# Patient Record
Sex: MALE | Race: WHITE | NOT HISPANIC OR LATINO | Employment: OTHER | URBAN - METROPOLITAN AREA
[De-identification: names, ages, dates, MRNs, and addresses within clinical notes are randomized per-mention and may not be internally consistent; named-entity substitution may affect disease eponyms.]

---

## 2018-10-05 ENCOUNTER — OFFICE VISIT (OUTPATIENT)
Dept: FAMILY MEDICINE | Facility: CLINIC | Age: 68
End: 2018-10-05
Payer: MEDICARE

## 2018-10-05 VITALS
DIASTOLIC BLOOD PRESSURE: 85 MMHG | HEART RATE: 85 BPM | TEMPERATURE: 98.6 F | OXYGEN SATURATION: 99 % | SYSTOLIC BLOOD PRESSURE: 174 MMHG

## 2018-10-05 DIAGNOSIS — I10 ESSENTIAL HYPERTENSION WITH GOAL BLOOD PRESSURE LESS THAN 140/90: Primary | ICD-10-CM

## 2018-10-05 DIAGNOSIS — Z23 NEED FOR INFLUENZA VACCINATION: ICD-10-CM

## 2018-10-05 DIAGNOSIS — F41.9 ANXIETY: ICD-10-CM

## 2018-10-05 PROCEDURE — 99203 OFFICE O/P NEW LOW 30 MIN: CPT | Mod: 25 | Performed by: FAMILY MEDICINE

## 2018-10-05 PROCEDURE — 90662 IIV NO PRSV INCREASED AG IM: CPT | Performed by: FAMILY MEDICINE

## 2018-10-05 PROCEDURE — G0008 ADMIN INFLUENZA VIRUS VAC: HCPCS | Performed by: FAMILY MEDICINE

## 2018-10-05 RX ORDER — INDOMETHACIN 50 MG/1
50 CAPSULE ORAL
COMMUNITY
Start: 2018-06-17 | End: 2019-12-31

## 2018-10-05 RX ORDER — TRAZODONE HYDROCHLORIDE 50 MG/1
50 TABLET, FILM COATED ORAL
COMMUNITY
Start: 2018-06-17 | End: 2022-10-04

## 2018-10-05 RX ORDER — LOSARTAN POTASSIUM 100 MG/1
100 TABLET ORAL
COMMUNITY
End: 2018-12-05

## 2018-10-05 RX ORDER — CARVEDILOL 12.5 MG/1
12.5 TABLET ORAL 2 TIMES DAILY WITH MEALS
Qty: 180 TABLET | Refills: 1 | Status: SHIPPED | OUTPATIENT
Start: 2018-10-05 | End: 2018-12-05

## 2018-10-05 RX ORDER — HYDRALAZINE HYDROCHLORIDE 25 MG/1
50 TABLET, FILM COATED ORAL 2 TIMES DAILY
COMMUNITY
End: 2018-12-05

## 2018-10-05 RX ORDER — PROPRANOLOL HYDROCHLORIDE 20 MG/1
20 TABLET ORAL 2 TIMES DAILY
COMMUNITY
Start: 2018-10-02 | End: 2018-10-05

## 2018-10-05 NOTE — MR AVS SNAPSHOT
After Visit Summary   10/5/2018    Bill Smith    MRN: [id number]           Patient Information     Date Of Birth          1950        Visit Information        Provider Department      10/5/2018 3:00 PM Nirmal Lubin MD [facility]        Today's Diagnoses     Essential hypertension with goal blood pressure less than 140/90    -  1    Need for influenza vaccination          Care Instructions    At [facility], we strive to deliver an exceptional experience to you, every time we see you.  If you receive a survey in the mail, please send us back your thoughts. We really do value your feedback.    Based on your medical history, these are the current health maintenance/preventive care services that you are due for (some may have been done at this visit.)  Health Maintenance Due   Topic Date Due     PHQ-2 Q1 YR  10/22/1960     HEPATITIS C SCREENING  10/22/1966     FALL RISK ASSESSMENT  10/22/2013     PNEUMOCOCCAL (1 of 2 - PCV13) 10/22/2013     BMP Q1 YR  11/28/2013     MICROALBUMIN Q1 YEAR  11/30/2013     ADVANCE DIRECTIVE PLANNING Q5 YRS  03/29/2017     LIPID SCREEN Q5 YR MALE (SYSTEM ASSIGNED)  11/28/2017     INFLUENZA VACCINE (1) 09/01/2018         Suggested websites for health information:  Www.[facility] SpringsFood [employer] S[employer] : Up to date and easily searchable information on multiple topics.  Www.medlineplus.gov : medication info, interactive tutorials, watch real surgeries online  Www.familydoctor.org : good info from the Academy of Family Physicians  Www.cdc.gov : public health info, travel advisories, epidemics (H1N1)  Www.aap.org : children's health info, normal development, vaccinations  Www.health.state[place] : [place] dept of health, public health issues in [place], N1N1    Your care team:                            Family Medicine Internal Medicine   MD Jonathan Albarran MD Shantel Branch-Fleming, MD Katya Georgiev PA-C Nam Ho, MD Pediatrics   Jake Storm,  "JESSE Ho CNP, MD Bethany Templen, MD Deborah Mielke, MD Kim Thein, APRN CNP      Clinic hours: Monday - Thursday 7 am-7 pm;  7 am-5 pm.   Urgent care: Monday - Friday 11 am-9 pm; Saturday and  9 am-5 pm.  Pharmacy : Monday -Thursday 8 am-8 pm; Friday 8 am-6 pm; Saturday and  9 am-5 pm.     Clinic: [phone]   Pharmacy: [phone]                Follow-ups after your visit        Who to contact     If you have questions or need follow up information about today's clinic visit or your schedule please contact [facility] [place] directly at [phone].  Normal or non-critical lab and imaging results will be communicated to you by MyChart, letter or phone within 4 business days after the clinic has received the results. If you do not hear from us within 7 days, please contact the clinic through "[employer]"hart or phone. If you have a critical or abnormal lab result, we will notify you by phone as soon as possible.  Submit refill requests through iCet[employer] or call your pharmacy and they will forward the refill request to us. Please allow 3 business days for your refill to be completed.          Additional Information About Your Visit        iCetana Information     iCetana lets you send messages to your doctor, view your test results, renew your prescriptions, schedule appointments and more. To sign up, go to [email or web address] . Click on \"Log in\" on the left side of the screen, which will take you to the Welcome page. Then click on \"Sign up Now\" on the right side of the page.     You will be asked to enter the access code listed below, as well as some personal information. Please follow the directions to create your username and password.     Your access code is: [email or web address]  Expires: 1/3/2019  3:24 PM     Your access code will  in 90 days. If you need help or a new code, please call your [facility] or " [phone].        Care EveryWhere ID     This is your Care EveryWhere ID. This could be used by other organizations to access your [place] medical records  [id number]         Blood Pressure from Last 3 Encounters:   11/30/12 138/86   10/31/12 139/80   07/17/12 132/84    Weight from Last 3 Encounters:   11/30/12 250 lb (113.4 kg)   10/31/12 251 lb (113.9 kg)   07/17/12 246 lb (111.6 kg)              We Performed the Following     ADMIN 1st VACCINE     FLU VACCINE, INCREASED ANTIGEN, PRESV FREE          Today's Medication Changes          These changes are accurate as of 10/5/18  3:24 PM.  If you have any questions, ask your nurse or doctor.               Start taking these medicines.        Dose/Directions    carvedilol 12.5 MG tablet   Commonly known as:  COREG   Used for:  Essential hypertension with goal blood pressure less than 140/90   Started by:  Nirmal Lubin MD        Dose:  12.5 mg   Take 1 tablet (12.5 mg) by mouth 2 times daily (with meals)   Quantity:  180 tablet   Refills:  1         Stop taking these medicines if you haven't already. Please contact your care team if you have questions.     propranolol 20 MG tablet   Commonly known as:  INDERAL   Stopped by:  Nirmal Lubin MD                Where to get your medicines      These medications were sent to [facility] [address] [place] [address] [address] [address]     Phone:  [phone]     carvedilol 12.5 MG tablet                Primary Care Provider Office Phone # Fax #    Nirmal Lubin [phone]       [address]  [place] [address]        Equal Access to Services     [facility] : Hadii marco cameron hadashmicki Soomaali, waaxda luqadaha, qaybta kaalmada donya hamilton. So [facility] [phone].    ATENCIÓN: Si habla español, tiene a garduno disposición servicios gratuitos de asistencia lingüística. Llame al [phone].    We comply with applicable [facility] civil  rights laws and [place] laws. We do not discriminate on the basis of race, color, national origin, age, disability, sex, sexual orientation, or gender identity.            Thank you!     Thank you for choosing [facility]  for your care. Our goal is always to provide you with excellent care. Hearing back from our patients is one way we can continue to improve our services. Please take a few minutes to complete the written survey that you may receive in the mail after your visit with us. Thank you!             Your Updated Medication List - Protect others around you: Learn how to safely use, store and throw away your medicines at www.disposemymeds.org.          This list is accurate as of 10/5/18  3:24 PM.  Always use your most recent med list.                   Brand Name Dispense Instructions for use Diagnosis    allopurinol 100 MG tablet    ZYLOPRIM    90 tablet    Take 1 tablet by mouth daily.    Gout       ascorbic acid 1000 MG Tabs    vitamin C     Take 1 tablet by mouth daily.        carvedilol 12.5 MG tablet    COREG    180 tablet    Take 1 tablet (12.5 mg) by mouth 2 times daily (with meals)    Essential hypertension with goal blood pressure less than 140/90       hydrALAZINE 25 MG tablet    APRESOLINE     Take 50 mg by mouth 3 times daily        indomethacin 50 MG capsule    INDOCIN     Take 50 mg by mouth        losartan 100 MG tablet    COZAAR     Take 100 mg by mouth        MULTIPLE VITAMIN PO      Take 1 tablet by mouth daily.        traZODone 50 MG tablet    DESYREL     Take 50 mg by mouth

## 2018-10-05 NOTE — PATIENT INSTRUCTIONS
At [facility], we strive to deliver an exceptional experience to you, every time we see you.  If you receive a survey in the mail, please send us back your thoughts. We really do value your feedback.    Based on your medical history, these are the current health maintenance/preventive care services that you are due for (some may have been done at this visit.)  Health Maintenance Due   Topic Date Due     PHQ-2 Q1 YR  10/22/1960     HEPATITIS C SCREENING  10/22/1966     FALL RISK ASSESSMENT  10/22/2013     PNEUMOCOCCAL (1 of 2 - PCV13) 10/22/2013     BMP Q1 YR  11/28/2013     MICROALBUMIN Q1 YEAR  11/30/2013     ADVANCE DIRECTIVE PLANNING Q5 YRS  03/29/2017     LIPID SCREEN Q5 YR MALE (SYSTEM ASSIGNED)  11/28/2017     INFLUENZA VACCINE (1) 09/01/2018         Suggested websites for health information:  Www.[employer] : Up to date and easily searchable information on multiple topics.  Www.Roomixer.gov : medication info, interactive tutorials, watch real surgeries online  Www.familydoctor.org : good info from the Academy of Family Physicians  Www.cdc.gov : public health info, travel advisories, epidemics (H1N1)  Www.aap.org : children's health info, normal development, vaccinations  Www.health.[facility].[place].us : [place] dept of health, public health issues in [place], N1N1    Your care team:                            Family Medicine Internal Medicine   MD Jonathan Albarran MD Shantel Branch-Fleming, MD Katya Georgiev PA-C Nam Ho, MD Pediatrics   AUGUSTINE Donaldson, MD Sabrina Vigil CNP, MD Deborah Mielke, MD Kim Thein, RAVI CNP      Clinic hours: Monday - Thursday 7 am-7 pm; Fridays 7 am-5 pm.   Urgent care: Monday - Friday 11 am-9 pm; Saturday and Sunday 9 am-5 pm.  Pharmacy : Monday -Thursday 8 am-8 pm; Friday 8 am-6 pm; Saturday and Sunday 9 am-5 pm.     Clinic: [phone]   Pharmacy: [phone]

## 2018-10-05 NOTE — PROGRESS NOTES
SUBJECTIVE:   Bill Smith is a 67 year old male who presents to clinic today for the following health issues:    New Patient/ Establish care  Hypertension Follow-up      Outpatient blood pressures are being checked at home.  Results are 150-160/78.    Low Salt Diet: not monitoring salt    Anxiety Follow-Up    Status since last visit: stable    Other associated symptoms:None    Complicating factors:   Significant life event: No   Current substance abuse: None  Depression symptoms: No  ABDULAZIZ-7 SCORE 9/27/2011   Total Score 8       ABDULAZIZ-7    Amount of exercise or physical activity: None    Problems taking medications regularly: No    Medication side effects: none    Diet: regular (no restrictions)        Problem list and histories reviewed & adjusted, as indicated.  Additional history: as documented    Patient Active Problem List   Diagnosis     History of hernia repair     Parathyroid adenoma     Gout     CARDIOVASCULAR SCREENING; LDL GOAL LESS THAN 130     Hypertension goal BP (blood pressure) < 140/90     Insomnia     Anxiety     Advanced directives, counseling/discussion     Past Surgical History:   Procedure Laterality Date     C APPENDECTOMY       HEAD & NECK SURGERY       HERNIA REPAIR       LAMINECT/DISCECTOMY, CERVICAL  1999       Social History   Substance Use Topics     Smoking status: Never Smoker     Smokeless tobacco: Never Used     Alcohol use Yes     Family History   Problem Relation Age of Onset     Prostate Cancer Father      Alzheimer Disease Paternal Grandmother      Hypertension Maternal Uncle      AGE 60'S           Reviewed and updated as needed this visit by clinical staff  Tobacco  Meds  Med Hx  Surg Hx  Fam Hx  Soc Hx      Reviewed and updated as needed this visit by Provider         ROS:  Constitutional, HEENT, cardiovascular, pulmonary, GI, , musculoskeletal, neuro, skin, endocrine and psych systems are negative, except as otherwise noted.    OBJECTIVE:     /85  Pulse 85   Temp 98.6  F (37  C)  SpO2 99%  There is no height or weight on file to calculate BMI.  GENERAL: healthy, alert and no distress  NECK: no adenopathy, no asymmetry, masses, or scars and thyroid normal to palpation  RESP: lungs clear to auscultation - no rales, rhonchi or wheezes  CV: regular rate and rhythm, normal S1 S2, no S3 or S4, no murmur, click or rub, no peripheral edema and peripheral pulses strong  ABDOMEN: soft, nontender, no hepatosplenomegaly, no masses and bowel sounds normal  MS: no gross musculoskeletal defects noted, no edema    Diagnostic Test Results:  none     ASSESSMENT/PLAN:     1. Essential hypertension with goal blood pressure less than 140/90  Not controlled. Discontinue propranolol. Trial carvedilol 12.5 mg bid. RTC in 1 month for recheck. Adjust dose if able to.  - carvedilol (COREG) 12.5 MG tablet; Take 1 tablet (12.5 mg) by mouth 2 times daily (with meals)  Dispense: 180 tablet; Refill: 1    2. Anxiety  Stable.    3. Need for influenza vaccination    - FLU VACCINE, INCREASED ANTIGEN, PRESV FREE  - ADMIN 1st VACCINE    Work on weight loss  Regular exercise  See Patient Instructions    Nirmal Lubin MD, MD  [facility]

## 2018-10-05 NOTE — NURSING NOTE
Injectable Influenza Immunization Documentation    1.  Has the patient received the information for the injectable influenza vaccine? YES     2. Is the patient 6 months of age or older? YES     3. Does the patient have any of the following contraindications?         Severe allergy to eggs? No     Severe allergic reaction to previous influenza vaccines? No   Severe allergy to latex? No       History of Guillain-Hazelwood syndrome? No     Currently have a temperature greater than 100.4F? No     Prior to injection verified patient identity using patient's name and date of birth.  Due to injection administration, patient instructed to remain in clinic for 15 minutes  afterwards, and to report any adverse reaction to me immediately.     Vaccination given by Ivette Fisher MA

## 2018-12-05 ENCOUNTER — OFFICE VISIT (OUTPATIENT)
Dept: FAMILY MEDICINE | Facility: CLINIC | Age: 68
End: 2018-12-05
Payer: MEDICARE

## 2018-12-05 VITALS
HEART RATE: 75 BPM | DIASTOLIC BLOOD PRESSURE: 84 MMHG | SYSTOLIC BLOOD PRESSURE: 150 MMHG | RESPIRATION RATE: 16 BRPM | BODY MASS INDEX: 32.74 KG/M2 | HEIGHT: 73 IN | OXYGEN SATURATION: 97 % | WEIGHT: 247 LBS | TEMPERATURE: 97.5 F

## 2018-12-05 DIAGNOSIS — M1A.00X0 IDIOPATHIC CHRONIC GOUT WITHOUT TOPHUS, UNSPECIFIED SITE: ICD-10-CM

## 2018-12-05 DIAGNOSIS — I10 ESSENTIAL HYPERTENSION WITH GOAL BLOOD PRESSURE LESS THAN 140/90: Primary | ICD-10-CM

## 2018-12-05 LAB
ANION GAP SERPL CALCULATED.3IONS-SCNC: 9 MMOL/L (ref 3–14)
BUN SERPL-MCNC: 10 MG/DL (ref 7–30)
CALCIUM SERPL-MCNC: 8.6 MG/DL (ref 8.5–10.1)
CHLORIDE SERPL-SCNC: 109 MMOL/L (ref 94–109)
CO2 SERPL-SCNC: 23 MMOL/L (ref 20–32)
CREAT SERPL-MCNC: 0.76 MG/DL (ref 0.66–1.25)
GFR SERPL CREATININE-BSD FRML MDRD: >90 ML/MIN/1.7M2
GLUCOSE SERPL-MCNC: 117 MG/DL (ref 70–99)
POTASSIUM SERPL-SCNC: 3.7 MMOL/L (ref 3.4–5.3)
SODIUM SERPL-SCNC: 141 MMOL/L (ref 133–144)

## 2018-12-05 PROCEDURE — 36415 COLL VENOUS BLD VENIPUNCTURE: CPT | Performed by: FAMILY MEDICINE

## 2018-12-05 PROCEDURE — 80048 BASIC METABOLIC PNL TOTAL CA: CPT | Performed by: FAMILY MEDICINE

## 2018-12-05 PROCEDURE — 99214 OFFICE O/P EST MOD 30 MIN: CPT | Performed by: FAMILY MEDICINE

## 2018-12-05 RX ORDER — ALLOPURINOL 100 MG/1
100 TABLET ORAL 2 TIMES DAILY
Qty: 180 TABLET | Refills: 3 | Status: SHIPPED | OUTPATIENT
Start: 2018-12-05 | End: 2019-05-29

## 2018-12-05 RX ORDER — CARVEDILOL 25 MG/1
25 TABLET ORAL 2 TIMES DAILY WITH MEALS
Qty: 180 TABLET | Refills: 1 | Status: SHIPPED | OUTPATIENT
Start: 2018-12-05 | End: 2019-07-09

## 2018-12-05 RX ORDER — ALLOPURINOL 100 MG/1
100 TABLET ORAL
COMMUNITY
End: 2018-12-05

## 2018-12-05 RX ORDER — HYDRALAZINE HYDROCHLORIDE 50 MG/1
50 TABLET, FILM COATED ORAL 3 TIMES DAILY
Qty: 270 TABLET | Refills: 3 | Status: SHIPPED | OUTPATIENT
Start: 2018-12-05 | End: 2019-07-09

## 2018-12-05 RX ORDER — LOSARTAN POTASSIUM 100 MG/1
100 TABLET ORAL DAILY
Qty: 90 TABLET | Refills: 3 | Status: SHIPPED | OUTPATIENT
Start: 2018-12-05 | End: 2020-02-04

## 2018-12-05 ASSESSMENT — PAIN SCALES - GENERAL: PAINLEVEL: NO PAIN (0)

## 2018-12-05 NOTE — MR AVS SNAPSHOT
After Visit Summary   12/5/2018    Bill Smith    MRN: [id number]           Patient Information     Date Of Birth          1950        Visit Information        Provider Department      12/5/2018 11:20 AM Nirmal Lubin MD [facility]        Today's Diagnoses     Essential hypertension with goal blood pressure less than 140/90    -  1      Care Instructions    At [facility], we strive to deliver an exceptional experience to you, every time we see you.  If you receive a survey in the mail, please send us back your thoughts. We really do value your feedback.    Based on your medical history, these are the current health maintenance/preventive care services that you are due for (some may have been done at this visit.)  Health Maintenance Due   Topic Date Due     PHQ-2 Q1 YR  10/22/1960     HEPATITIS C SCREENING  10/22/1966     FALL RISK ASSESSMENT  10/22/2013     PNEUMOCOCCAL (1 of 2 - PCV13) 10/22/2013     BMP Q1 YR  11/28/2013     MICROALBUMIN Q1 YEAR  11/30/2013     ADVANCE DIRECTIVE PLANNING Q5 YRS  03/29/2017     LIPID SCREEN Q5 YR MALE (SYSTEM ASSIGNED)  11/28/2017         Suggested websites for health information:  Www.[employer] : Up to date and easily searchable information on multiple topics.  Www.medlineplus.gov : medication info, interactive tutorials, watch real surgeries online  Www.familydoctor.org : good info from the Academy of Family Physicians  Www.cdc.gov : public health info, travel advisories, epidemics (H1N1)  Www.aap.org : children's health info, normal development, vaccinations  Www.health.state.[place].us : [place] dept of health, public health issues in [place], N1N1    Your care team:                            Family Medicine Internal Medicine   MD Jonathan Albarran MD Shantel Branch-Fleming, MD Katya Georgiev PA-C Nam Ho, MD Pediatrics   AUGUSTINE Donaldson, JESSE Calderón MD  "MD Eleonora Garcia MD Kim Thein, APRN CNP      Clinic hours: Monday - Thursday 7 am-7 pm; Fridays 7 am-5 pm.   Urgent care: Monday - Friday 11 am-9 pm; Saturday and Sunday 9 am-5 pm.  Pharmacy : Monday -Thursday 8 am-8 pm; Friday 8 am-6 pm; Saturday and Sunday 9 am-5 pm.     Clinic: [phone]   Pharmacy: [phone]            Follow-ups after your visit        Follow-up notes from your care team     Return in about 3 weeks (around 12/26/2018) for BP Recheck.      Who to contact     If you have questions or need follow up information about today's clinic visit or your schedule please contact [facility] [place] directly at [phone].  Normal or non-critical lab and imaging results will be communicated to you by MyChart, letter or phone within 4 business days after the clinic has received the results. If you do not hear from us within 7 days, please contact the clinic through MyChart or phone. If you have a critical or abnormal lab result, we will notify you by phone as soon as possible.  Submit refill requests through [employer] or call your pharmacy and they will forward the refill request to us. Please allow 3 business days for your refill to be completed.          Additional Information About Your Visit        Care EveryWhere ID     This is your Care EveryWhere ID. This could be used by other organizations to access your [place] medical records  [id number]        Your Vitals Were     Pulse Temperature Respirations Height Pulse Oximetry BMI (Body Mass Index)    75 97.5  F (36.4  C) (Oral) 16 6' 0.5\" (1.842 m) 97% 33.04 kg/m2       Blood Pressure from Last 3 Encounters:   12/05/18 150/84   10/05/18 174/85   11/30/12 138/86    Weight from Last 3 Encounters:   12/05/18 247 lb (112 kg)   11/30/12 250 lb (113.4 kg)   10/31/12 251 lb (113.9 kg)              We Performed the Following     Basic metabolic panel          Today's Medication Changes          These changes are " accurate as of 12/5/18 11:28 AM.  If you have any questions, ask your nurse or doctor.               These medicines have changed or have updated prescriptions.        Dose/Directions    allopurinol 100 MG tablet   Commonly known as:  ZYLOPRIM   This may have changed:  Another medication with the same name was removed. Continue taking this medication, and follow the directions you see here.   Changed by:  Nirmal Lubin MD        Dose:  100 mg   Take 100 mg by mouth   Refills:  0       carvedilol 25 MG tablet   Commonly known as:  COREG   This may have changed:    - medication strength  - how much to take  - additional instructions   Used for:  Essential hypertension with goal blood pressure less than 140/90   Changed by:  Nirmal Lubin MD        Dose:  25 mg   Take 1 tablet (25 mg) by mouth 2 times daily (with meals) For blood pressure.   Quantity:  180 tablet   Refills:  1       hydrALAZINE 50 MG tablet   Commonly known as:  APRESOLINE   This may have changed:    - medication strength  - when to take this  - additional instructions   Used for:  Essential hypertension with goal blood pressure less than 140/90   Changed by:  Nirmal Lubin MD        Dose:  50 mg   Take 1 tablet (50 mg) by mouth 3 times daily For blood pressure.   Quantity:  270 tablet   Refills:  3       losartan 100 MG tablet   Commonly known as:  COZAAR   This may have changed:    - when to take this  - additional instructions   Used for:  Essential hypertension with goal blood pressure less than 140/90   Changed by:  Nirmal Lubin MD        Dose:  100 mg   Take 1 tablet (100 mg) by mouth daily For blood pressure.   Quantity:  90 tablet   Refills:  3            Where to get your medicines      These medications were sent to [facility] [address] [place] [address] [facility]  [address] [facility] [address]     Phone:  [phone]     carvedilol 25 MG tablet    hydrALAZINE 50 MG tablet    losartan 100 MG tablet                Primary Care Provider  Office Phone # Fax #    Nirmal Lubin [phone]       [address]  [facility] [address]        Equal Access to Services     DELMAR MARTINEZ : Haddenise marco cameron marinao Solowellali, waaxda luqadaha, qaybta kaalmada adefrankyada, donya rubio marafrank chauhan maria victoria lopez. So [facility] [phone].    ATENCIÓN: Si habla español, tiene a garduno disposición servicios gratuitos de asistencia lingüística. Llame al [phone].    We comply with applicable federal civil rights laws and [place] laws. We do not discriminate on the basis of race, color, national origin, age, disability, sex, sexual orientation, or gender identity.            Thank you!     Thank you for choosing [facility]  for your care. Our goal is always to provide you with excellent care. Hearing back from our patients is one way we can continue to improve our services. Please take a few minutes to complete the written survey that you may receive in the mail after your visit with us. Thank you!             Your Updated Medication List - Protect others around you: Learn how to safely use, store and throw away your medicines at www.disposemymeds.org.          This list is accurate as of 12/5/18 11:28 AM.  Always use your most recent med list.                   Brand Name Dispense Instructions for use Diagnosis    allopurinol 100 MG tablet    ZYLOPRIM     Take 100 mg by mouth        carvedilol 25 MG tablet    COREG    180 tablet    Take 1 tablet (25 mg) by mouth 2 times daily (with meals) For blood pressure.    Essential hypertension with goal blood pressure less than 140/90       hydrALAZINE 50 MG tablet    APRESOLINE    270 tablet    Take 1 tablet (50 mg) by mouth 3 times daily For blood pressure.    Essential hypertension with goal blood pressure less than 140/90       indomethacin 50 MG capsule    INDOCIN     Take 50 mg by mouth        losartan 100 MG tablet    COZAAR    90 tablet    Take 1 tablet (100 mg) by mouth daily For blood  pressure.    Essential hypertension with goal blood pressure less than 140/90       MULTIPLE VITAMIN PO      Take 1 tablet by mouth daily.        traZODone 50 MG tablet    DESYREL     Take 50 mg by mouth        vitamin C 1000 MG Tabs    ASCORBIC ACID     Take 1 tablet by mouth daily.

## 2018-12-05 NOTE — PATIENT INSTRUCTIONS
At [facility], we strive to deliver an exceptional experience to you, every time we see you.  If you receive a survey in the mail, please send us back your thoughts. We really do value your feedback.    Based on your medical history, these are the current health maintenance/preventive care services that you are due for (some may have been done at this visit.)  Health Maintenance Due   Topic Date Due     PHQ-2 Q1 YR  10/22/1960     HEPATITIS C SCREENING  10/22/1966     FALL RISK ASSESSMENT  10/22/2013     PNEUMOCOCCAL (1 of 2 - PCV13) 10/22/2013     BMP Q1 YR  11/28/2013     MICROALBUMIN Q1 YEAR  11/30/2013     ADVANCE DIRECTIVE PLANNING Q5 YRS  03/29/2017     LIPID SCREEN Q5 YR MALE (SYSTEM ASSIGNED)  11/28/2017         Suggested websites for health information:  Www.[employer] : Up to date and easily searchable information on multiple topics.  Www.[employer]ificationPoint.gov : medication info, interactive tutorials, watch real surgeries online  Www.familydoctor.org : good info from the Academy of Family Physicians  Www.cdc.gov : public health info, travel advisories, epidemics (H1N1)  Www.aap.org : children's health info, normal development, vaccinations  Www.health.[facility].[place].us : [place] dept of health, public health issues in [place], N1N1    Your care team:                            Family Medicine Internal Medicine   MD Jonathan Albarran MD Shantel Branch-Fleming, MD Katya Georgiev PA-C Nam Ho, MD Pediatrics   AUGUSTINE Donaldson, MD Sabrina Vigil CNP, MD Deborah Mielke, MD Kim Thein, APRN CNP      Clinic hours: Monday - Thursday 7 am-7 pm; Fridays 7 am-5 pm.   Urgent care: Monday - Friday 11 am-9 pm; Saturday and Sunday 9 am-5 pm.  Pharmacy : Monday -Thursday 8 am-8 pm; Friday 8 am-6 pm; Saturday and Sunday 9 am-5 pm.     Clinic: [phone]   Pharmacy: [phone]

## 2018-12-05 NOTE — PROGRESS NOTES
"  SUBJECTIVE:   Bill Smith is a 68 year old male who presents to clinic today for the following health issues:      Hypertension Follow-up      Outpatient blood pressures are being checked at home.  Results are 155-165/85-88.    Low Salt Diet: not monitoring      Amount of exercise or physical activity: 6-7 days/week for an average of 45-60 minutes    Problems taking medications regularly: No    Medication side effects: none    Diet: not monitoring, but does not add salt.      Problem list and histories reviewed & adjusted, as indicated.  Additional history: as documented    Patient Active Problem List   Diagnosis     History of hernia repair     Parathyroid adenoma     Gout     CARDIOVASCULAR SCREENING; LDL GOAL LESS THAN 130     Insomnia     Anxiety     Advanced directives, counseling/discussion     Essential hypertension with goal blood pressure less than 140/90     Idiopathic chronic gout without tophus, unspecified site     Past Surgical History:   Procedure Laterality Date     C APPENDECTOMY       HEAD & NECK SURGERY       HERNIA REPAIR       LAMINECT/DISCECTOMY, CERVICAL  1999       Social History   Substance Use Topics     Smoking status: Never Smoker     Smokeless tobacco: Never Used     Alcohol use Yes     Family History   Problem Relation Age of Onset     Prostate Cancer Father      Alzheimer Disease Paternal Grandmother      Hypertension Maternal Uncle      AGE 60'S           Reviewed and updated as needed this visit by clinical staff  Tobacco  Allergies  Meds  Med Hx  Surg Hx  Fam Hx  Soc Hx      Reviewed and updated as needed this visit by Provider         ROS:  Constitutional, HEENT, cardiovascular, pulmonary, GI, , musculoskeletal, neuro, skin, endocrine and psych systems are negative, except as otherwise noted.    OBJECTIVE:     /84  Pulse 75  Temp 97.5  F (36.4  C) (Oral)  Resp 16  Ht 6' 0.5\" (1.842 m)  Wt 247 lb (112 kg)  SpO2 97%  BMI 33.04 kg/m2  Body mass index is " 33.04 kg/(m^2).  GENERAL: healthy, alert and no distress  NECK: no adenopathy, no asymmetry, masses, or scars and thyroid normal to palpation  RESP: lungs clear to auscultation - no rales, rhonchi or wheezes  CV: regular rate and rhythm, normal S1 S2, no S3 or S4, no murmur, click or rub, no peripheral edema and peripheral pulses strong  ABDOMEN: soft, nontender, no hepatosplenomegaly, no masses and bowel sounds normal  MS: no gross musculoskeletal defects noted, no edema    Diagnostic Test Results:  none     ASSESSMENT/PLAN:     1. Essential hypertension with goal blood pressure less than 140/90  Not controlled. Increased carvedilol from 12.5 mg BID to 25 mg BID. Increased hydralazine from 50 mg BID to 50 mg TID. Recheck bp in 3 weeks.  - carvedilol (COREG) 25 MG tablet; Take 1 tablet (25 mg) by mouth 2 times daily (with meals) For blood pressure.  Dispense: 180 tablet; Refill: 1  - Basic metabolic panel  - losartan (COZAAR) 100 MG tablet; Take 1 tablet (100 mg) by mouth daily For blood pressure.  Dispense: 90 tablet; Refill: 3  - hydrALAZINE (APRESOLINE) 50 MG tablet; Take 1 tablet (50 mg) by mouth 3 times daily For blood pressure.  Dispense: 270 tablet; Refill: 3    2. Idiopathic chronic gout without tophus, unspecified site  Controlled.  - allopurinol (ZYLOPRIM) 100 MG tablet; Take 1 tablet (100 mg) by mouth 2 times daily For gout.  Dispense: 180 tablet; Refill: 3    Work on weight loss  Regular exercise  See Patient Instructions    Nirmal Lubin MD, MD  [facility]

## 2018-12-19 ENCOUNTER — DOCUMENTATION ONLY (OUTPATIENT)
Dept: LAB | Facility: CLINIC | Age: 68
End: 2018-12-19

## 2018-12-19 DIAGNOSIS — I10 ESSENTIAL HYPERTENSION WITH GOAL BLOOD PRESSURE LESS THAN 140/90: Primary | ICD-10-CM

## 2018-12-19 NOTE — PROGRESS NOTES
Patient has an upcoming previsit appointment on 12/26/2018. Please review pended orders and add additional orders if needed.     Thank you,   Suzette Baker

## 2018-12-26 DIAGNOSIS — I10 ESSENTIAL HYPERTENSION WITH GOAL BLOOD PRESSURE LESS THAN 140/90: ICD-10-CM

## 2018-12-26 LAB
ANION GAP SERPL CALCULATED.3IONS-SCNC: 8 MMOL/L (ref 3–14)
BUN SERPL-MCNC: 8 MG/DL (ref 7–30)
CALCIUM SERPL-MCNC: 8.3 MG/DL (ref 8.5–10.1)
CHLORIDE SERPL-SCNC: 108 MMOL/L (ref 94–109)
CHOLEST SERPL-MCNC: 114 MG/DL
CO2 SERPL-SCNC: 24 MMOL/L (ref 20–32)
CREAT SERPL-MCNC: 0.81 MG/DL (ref 0.66–1.25)
GFR SERPL CREATININE-BSD FRML MDRD: 90 ML/MIN/{1.73_M2}
GLUCOSE SERPL-MCNC: 137 MG/DL (ref 70–99)
HDLC SERPL-MCNC: 39 MG/DL
LDLC SERPL CALC-MCNC: 57 MG/DL
NONHDLC SERPL-MCNC: 75 MG/DL
POTASSIUM SERPL-SCNC: 3.8 MMOL/L (ref 3.4–5.3)
SODIUM SERPL-SCNC: 140 MMOL/L (ref 133–144)
TRIGL SERPL-MCNC: 91 MG/DL

## 2018-12-26 PROCEDURE — 80048 BASIC METABOLIC PNL TOTAL CA: CPT | Performed by: FAMILY MEDICINE

## 2018-12-26 PROCEDURE — 36415 COLL VENOUS BLD VENIPUNCTURE: CPT | Performed by: FAMILY MEDICINE

## 2018-12-26 PROCEDURE — 80061 LIPID PANEL: CPT | Performed by: FAMILY MEDICINE

## 2019-03-05 ENCOUNTER — TELEPHONE (OUTPATIENT)
Dept: FAMILY MEDICINE | Facility: CLINIC | Age: 69
End: 2019-03-05

## 2019-03-05 NOTE — TELEPHONE ENCOUNTER
Please contact patient and assist him in scheduling appointment. Needs appointment for this.  Ann Aaron RN

## 2019-03-05 NOTE — TELEPHONE ENCOUNTER
Reason for Call:  Other prescription    Detailed comments: Pt would like to know if he can take  CBD Oil  and would like to know if that will not conflict with his current medications or his Spouse's.     Phone Number Patient can be reached at: Work number on file:  [phone] (work)    Best Time: anytime    Can we leave a detailed message on this number? YES    Call taken on 3/5/2019 at 1:14 PM by Enrique Turner

## 2019-03-06 NOTE — TELEPHONE ENCOUNTER
Called, spoke with wife, she is notified and will schedule an appointment when they return from [place] end of April beginning of May to further discuss this.  Flash Hansen,  [facility]

## 2019-04-15 ENCOUNTER — TRANSFERRED RECORDS (OUTPATIENT)
Dept: HEALTH INFORMATION MANAGEMENT | Facility: CLINIC | Age: 69
End: 2019-04-15

## 2019-05-29 ENCOUNTER — OFFICE VISIT (OUTPATIENT)
Dept: FAMILY MEDICINE | Facility: CLINIC | Age: 69
End: 2019-05-29
Payer: MEDICARE

## 2019-05-29 VITALS
SYSTOLIC BLOOD PRESSURE: 158 MMHG | OXYGEN SATURATION: 95 % | HEART RATE: 74 BPM | RESPIRATION RATE: 17 BRPM | TEMPERATURE: 97.8 F | DIASTOLIC BLOOD PRESSURE: 88 MMHG | BODY MASS INDEX: 33.4 KG/M2 | HEIGHT: 73 IN | WEIGHT: 252 LBS

## 2019-05-29 DIAGNOSIS — R79.9 ABNORMAL FINDING OF BLOOD CHEMISTRY: ICD-10-CM

## 2019-05-29 DIAGNOSIS — I10 ESSENTIAL HYPERTENSION WITH GOAL BLOOD PRESSURE LESS THAN 140/90: ICD-10-CM

## 2019-05-29 DIAGNOSIS — R73.03 PREDIABETES: ICD-10-CM

## 2019-05-29 DIAGNOSIS — Z13.1 SCREENING FOR DIABETES MELLITUS: ICD-10-CM

## 2019-05-29 DIAGNOSIS — M1A.00X0 IDIOPATHIC CHRONIC GOUT WITHOUT TOPHUS, UNSPECIFIED SITE: ICD-10-CM

## 2019-05-29 DIAGNOSIS — J01.90 ACUTE SINUSITIS WITH SYMPTOMS > 10 DAYS: Primary | ICD-10-CM

## 2019-05-29 LAB
CREAT UR-MCNC: 88 MG/DL
GLUCOSE BLD-MCNC: 148 MG/DL (ref 70–99)
HBA1C MFR BLD: 6.3 % (ref 0–5.6)
MICROALBUMIN UR-MCNC: 6 MG/L
MICROALBUMIN/CREAT UR: 7.2 MG/G CR (ref 0–17)

## 2019-05-29 PROCEDURE — 82043 UR ALBUMIN QUANTITATIVE: CPT | Performed by: PHYSICIAN ASSISTANT

## 2019-05-29 PROCEDURE — 82947 ASSAY GLUCOSE BLOOD QUANT: CPT | Performed by: PHYSICIAN ASSISTANT

## 2019-05-29 PROCEDURE — 36415 COLL VENOUS BLD VENIPUNCTURE: CPT | Performed by: PHYSICIAN ASSISTANT

## 2019-05-29 PROCEDURE — 99214 OFFICE O/P EST MOD 30 MIN: CPT | Performed by: PHYSICIAN ASSISTANT

## 2019-05-29 PROCEDURE — 83036 HEMOGLOBIN GLYCOSYLATED A1C: CPT | Performed by: PHYSICIAN ASSISTANT

## 2019-05-29 RX ORDER — ALLOPURINOL 100 MG/1
100 TABLET ORAL 2 TIMES DAILY
Qty: 180 TABLET | Refills: 3 | Status: SHIPPED | OUTPATIENT
Start: 2019-05-29 | End: 2020-02-17

## 2019-05-29 RX ORDER — ACETAMINOPHEN 325 MG/1
650 TABLET ORAL EVERY 6 HOURS PRN
Status: ON HOLD | COMMUNITY
End: 2023-08-12

## 2019-05-29 RX ORDER — DOXYCYCLINE 100 MG/1
100 TABLET ORAL EVERY 12 HOURS
Qty: 14 TABLET | Refills: 0 | Status: SHIPPED | OUTPATIENT
Start: 2019-05-29 | End: 2019-07-09

## 2019-05-29 RX ORDER — FLUTICASONE PROPIONATE 50 MCG
2 SPRAY, SUSPENSION (ML) NASAL
COMMUNITY
Start: 2018-02-07 | End: 2019-07-09

## 2019-05-29 RX ORDER — HYDROCHLOROTHIAZIDE 25 MG/1
25 TABLET ORAL DAILY
Qty: 30 TABLET | Refills: 1 | Status: SHIPPED | OUTPATIENT
Start: 2019-05-29 | End: 2019-07-09

## 2019-05-29 ASSESSMENT — PAIN SCALES - GENERAL: PAINLEVEL: SEVERE PAIN (6)

## 2019-05-29 ASSESSMENT — MIFFLIN-ST. JEOR: SCORE: 1949

## 2019-05-29 NOTE — PATIENT INSTRUCTIONS
Sinusitis [Abx Tx]    The sinuses are air-filled spaces within the bones of the face. They connect to the inside of the nose. Sinusitis is an inflammation of the tissue lining the sinus cavity. Sinus inflammation can occur during a cold or hay-fever (allergies to pollens and other particles in the air) and cause symptoms of sinus congestion and fullness. A sinus infection causes fever, headache and facial pain. There is usually green or yellow drainage from the nose or into the back of the throat (post-nasal drip). Antibiotics are prescribed to treat this condition.  Home Care:  Drink plenty of water, hot tea, and other liquids to stay well hydrated. This thins the mucus and promotes sinus drainage.  Apply heat to the painful areas of the face. Use a towel soaked in hot water. Or,  the shower and direct the hot spray onto your face. This is a good way to inhale warm water vapor and get heat on your face at the same time. (Cover your mouth and nose with your hands so you can still breathe as you do this.)  Use a vaporizer with products such as Vicks VapoRub (contains menthol) at night. Suck on peppermint, menthol or eucalyptus hard candies during the day.  An expectorant containing guaifenesin (such as Robitussin), helps to thin the mucus and promote drainage from the sinuses.  Over-the-counter decongestants may be used unless a similar medicine was prescribed. Nasal sprays work the fastest. Use one that contains phenylephrine (Osmar-synephrine, Sinex and others) or oxymetazoline (Afrin). First blow the nose gently to remove mucus, then apply the drops. Do not use these medicines more often than directed on the label or for more than three days or symptoms may worsen. You may also use tablets containing pseudoephedrine (Sudafed). Many sinus remedies combine ingredients, which may increase side effects. Read the labels or ask the pharmacist for help. NOTE: Persons with high blood pressure should not use  decongestants. They can raise blood pressure.  Antihistamines are useful if allergies are a cause of your sinusitis. The mildest one is chlorpheniramine (available without a prescription). The dose for adults is 8-12mg three times a day. [NOTE: Do not use chlorpheniramine if you have glaucoma or if you are a man with trouble urinating due to an enlarged prostate.] Claritin (loratidine) is an antihistamine that causes less drowsiness and is a good alternative for daytime use.  Do not use nasal rinses or irrigation during an acute sinus infection, unless advised by your doctor. Rinsing may spread the infection to other sinuses.  You may use acetaminophen (Tylenol) or ibuprofen (Motrin, Advil) to control pain, unless another pain medicine was prescribed. [ NOTE: If you have chronic liver or kidney disease or ever had a stomach ulcer, talk with your doctor before using these medicines.] (Aspirin should never be used in anyone under 18 years of age who is ill with a fever. It may cause severe liver damage.)  Finish the full course, even if you are feeling better after a few days.  Follow Up  with your doctor or this facility in one week or as instructed by our staff if not improving.  Get Prompt Medical Attention  if any of the following occur:  Facial pain or headache becomes more severe  Stiff neck  Unusual drowsiness or confusion, or not acting like your normal self  Swelling of the forehead or eyelids  Vision problems including blurred or double vision  Fever of 100.4 F (38 C) or higher, or as directed by your healthcare provider  Seizure    [id number] Formerly [facility], [address] [place] [address], [place] [address]. All rights reserved. This information is not intended as a substitute for professional medical care. Always follow your healthcare professional's instructions.        Patient Education     Prediabetes  You have been diagnosed with prediabetes. This means that the level of sugar (glucose) in your blood is too  high. If you have prediabetes, you are at risk for developing type 2 diabetes. Type 2 diabetes is diagnosed when the level of glucose in the blood reaches a certain high level. With prediabetes, it hasn t reached this point yet, but it is higher than normal. It is vital to make lifestyle changes to lower your blood sugar, improve your health, and prevent diabetes. This sheet will tell you more.      Why worry about prediabetes?  Prediabetes is a disease where the body s cells have trouble using glucose in the blood for energy. As a result, too much glucose stays in the blood and can affect how your heart and blood vessels work. Without changes in diet and lifestyle, the problem can get worse. Once you have type 2 diabetes, it is chronic (ongoing) and needs to be managed for the rest of your life. Diabetes can harm the body and your health by damaging organs, such as your eyes and kidneys. It makes you more likely to have heart disease. And it can damage nerves and blood vessels.  Who is a risk for prediabetes?  The exact cause of prediabetes is not clear. But certain risk factors make a person more likely to have it. These include:    A family history of type 2 diabetes    Being overweight    Being over age 45    Have hypertension or elevated cholesterol     Having had gestational diabetes    Not being physically active    Being ,  American, , , , or   Diagnosing prediabetes  Prediabetes may have no symptoms or you may have some of the symptoms of diabetes. The diagnosis is made with a blood test. You may have one or more of these blood tests:     Fasting glucose test. Blood is taken and tested after you have fasted (not eaten) for at least 8 hours. A normal test result is 99 milligrams per deciliter (mg/dL) or lower. Prediabetes is 100 mg/dL to 125 mg/dL. Diabetes is 126 mg/dL or higher.    Glucose tolerance test. Your blood sugar is measured  before and after you drink a very sugary liquid. A normal test result is 139 milligrams per deciliter (mg/dL) or lower. Prediabetes is 140 mg/dL to 199 mg/dL. Diabetes is 200 mg/dL or higher.    Hemoglobin A1c (HbA1c). Your HbA1c is normal if it is below 5.7%. Prediabetes is 5.7% to 6.4%. Diabetes is 6.5% or higher.   Treating prediabetes  The best way to treat prediabetes is to lose at least 5% to 7% of your current weight and be more physically active by getting at least 150 minutes a week of physical activity. When sitting for long periods of time, get up for short sessions of light activity every 30 minutes. These changes help the body s cells use blood sugar better. Even a small amount of weight loss can help. Work with your healthcare provider to make a plan to eat well and be more active. Keep in mind that small changes can add up. Other changes in your lifestyle (or even taking certain medicines, such as metformin) may make you less likely to develop diabetes. Your healthcare provider can talk with you about these.  Follow-up  If it is untreated, prediabetes can turn into diabetes. This is a serious health condition. Take steps to stop this from happening. Follow the treatment plan you have been given. You may have your blood glucose tested again in about 12 to 18 months.  Symptoms of diabetes  Let your healthcare provider know if you have any of the following:    Always feel very tired    Feel very thirsty or hungry much of the time    Have to urinate often    Lose weight for no reason    Feel numbness or tingling in your fingers or toes    Have cuts or bruises that don t seem to heal    Have blurry vision   Date Last Reviewed: 5/1/2016 2000-2018 [employer]. [address] [place] [address], [place] [address]. All rights reserved. This information is not intended as a substitute for professional medical care. Always follow your healthcare professional's instructions.

## 2019-05-29 NOTE — PROGRESS NOTES
Subjective     Bill Smith is a 68 year old male who presents to clinic today for the following health issues:    HPI   ENT Symptoms             Symptoms: cc Present Absent Comment   Fever/Chills  x     Fatigue  x     Muscle Aches   x    Eye Irritation  x  Right itchy eye   Sneezing  x     Nasal Navi/Drg  x     Sinus Pressure/Pain  x     Loss of smell   x    Dental pain   x    Sore Throat   x    Swollen Glands   x    Ear Pain/Fullness  x  Plugged right ear   Cough  x     Wheeze   x    Chest Pain   x    Shortness of breath  x     Rash   x    Other- Dizziness  x       Symptom duration:  x4 weeks   Symptom severity:  7/10   Treatments tried:  ibuprofen and acetaminophen    Contacts:  No        Would like glucose checked as has been eating more sugars lately.  Last glucose 137.      Lefty sonme allopurinol in [place], req early refill.      Bps often arounf 150 systoluic      Allergies   Allergen Reactions     Amlodipine      swelling     Bermuda Grass Other (See Comments)     Coughing sneezing     Lisinopril Cough     Molds & Smuts Other (See Comments)     Coughing sneezing  Coughing sneezing       Shellfish Allergy Other (See Comments)     Causes gout       Patient Active Problem List   Diagnosis     History of hernia repair     Parathyroid adenoma     Gout     CARDIOVASCULAR SCREENING; LDL GOAL LESS THAN 130     Insomnia     Anxiety     Advanced directives, counseling/discussion     Essential hypertension with goal blood pressure less than 140/90     Idiopathic chronic gout without tophus, unspecified site     Prediabetes       Past Medical History:   Diagnosis Date     Gout      History of hernia repair      HTN      Idiopathic chronic gout without tophus, unspecified site 12/5/2018     Parathyroid adenoma 2007    parathyroidectomy 04/2007         Current Outpatient Medications on File Prior to Visit:  acetaminophen (TYLENOL) 325 MG tablet Take 650 mg by mouth   ascorbic acid (VITAMIN C) 1000 MG TABS Take 1 tablet  "by mouth daily.   blood glucose (NO BRAND SPECIFIED) test strip Supplies: test strips, dispense  100. Lab Results     Component                Value               Date                     A1C                      6.0                 02/06/2017   carvedilol (COREG) 25 MG tablet Take 1 tablet (25 mg) by mouth 2 times daily (with meals) For blood pressure.   fluticasone (FLONASE) 50 MCG/ACT nasal spray Spray 2 sprays in nostril   hydrALAZINE (APRESOLINE) 50 MG tablet Take 1 tablet (50 mg) by mouth 3 times daily For blood pressure.   indomethacin (INDOCIN) 50 MG capsule Take 50 mg by mouth   losartan (COZAAR) 100 MG tablet Take 1 tablet (100 mg) by mouth daily For blood pressure.   MULTIPLE VITAMIN PO Take 1 tablet by mouth daily.   traZODone (DESYREL) 50 MG tablet Take 50 mg by mouth     No current facility-administered medications on file prior to visit.     Social History     Tobacco Use     Smoking status: Never Smoker     Smokeless tobacco: Never Used   Substance Use Topics     Alcohol use: Yes       Family History   Problem Relation Age of Onset     Prostate Cancer Father      Alzheimer Disease Paternal Grandmother      Hypertension Maternal Uncle         AGE 60'S       ROS:  Consitutional: As above  ENT: As above  Respiratory: As above    OBJECTIVE:  /88   Pulse 74   Temp 97.8  F (36.6  C) (Oral)   Resp 17   Ht 1.842 m (6' 0.5\")   Wt 114.3 kg (252 lb)   SpO2 95%   BMI 33.71 kg/m    GENERAL APPEARANCE: healthy, alert and no distress  EYES: conjunctiva clear  HENT:   TMs w/o erythema, effusion or bulging.  Nose and mouth without ulcers, erythema or lesions.  NO tonsillar enlargement erythema or exudates.   NECK: supple, nontender, no lymphadenopathy  RESP: lungs clear to auscultation - no rales, rhonchi or wheezes  CV: regular rates and rhythm, normal S1 S2, no murmur noted  NEURO: awake, alert      A1C 6.3    ASSESSMENT: Well appearing.    ICD-10-CM    1. Acute sinusitis with symptoms > 10 days J01.90 " doxycycline monohydrate (ADOXA) 100 MG tablet   2. Essential hypertension with goal blood pressure less than 140/90 I10 Albumin Random Urine Quantitative with Creat Ratio     hydrochlorothiazide (HYDRODIURIL) 25 MG tablet   3. Screening for diabetes mellitus Z13.1 Glucose whole blood     Hemoglobin A1c   4. Abnormal finding of blood chemistry  R79.9 Hemoglobin A1c   5. Prediabetes R73.03    6. Idiopathic chronic gout without tophus, unspecified site M1A.00X0 allopurinol (ZYLOPRIM) 100 MG tablet         PLAN:  Lots of rest and fluids.  RTC if any worsening symptoms or if not improving.    Jake Storm PA-C

## 2019-07-09 ENCOUNTER — OFFICE VISIT (OUTPATIENT)
Dept: FAMILY MEDICINE | Facility: CLINIC | Age: 69
End: 2019-07-09
Payer: MEDICARE

## 2019-07-09 VITALS
SYSTOLIC BLOOD PRESSURE: 143 MMHG | BODY MASS INDEX: 33.27 KG/M2 | HEIGHT: 73 IN | OXYGEN SATURATION: 93 % | RESPIRATION RATE: 16 BRPM | TEMPERATURE: 98.3 F | DIASTOLIC BLOOD PRESSURE: 83 MMHG | HEART RATE: 72 BPM | WEIGHT: 251 LBS

## 2019-07-09 DIAGNOSIS — I10 ESSENTIAL HYPERTENSION WITH GOAL BLOOD PRESSURE LESS THAN 140/90: ICD-10-CM

## 2019-07-09 DIAGNOSIS — Z13.6 CARDIOVASCULAR SCREENING; LDL GOAL LESS THAN 130: ICD-10-CM

## 2019-07-09 DIAGNOSIS — Z00.00 ENCOUNTER FOR MEDICARE ANNUAL WELLNESS EXAM: Primary | ICD-10-CM

## 2019-07-09 DIAGNOSIS — J30.2 SEASONAL ALLERGIC RHINITIS, UNSPECIFIED TRIGGER: ICD-10-CM

## 2019-07-09 PROCEDURE — G0438 PPPS, INITIAL VISIT: HCPCS | Performed by: FAMILY MEDICINE

## 2019-07-09 RX ORDER — CARVEDILOL 25 MG/1
25 TABLET ORAL 2 TIMES DAILY WITH MEALS
Qty: 180 TABLET | Refills: 1 | Status: SHIPPED | OUTPATIENT
Start: 2019-07-09 | End: 2020-02-17

## 2019-07-09 RX ORDER — HYDRALAZINE HYDROCHLORIDE 50 MG/1
50 TABLET, FILM COATED ORAL 3 TIMES DAILY
Qty: 270 TABLET | Refills: 3 | Status: SHIPPED | OUTPATIENT
Start: 2019-07-09 | End: 2020-08-11

## 2019-07-09 RX ORDER — AMLODIPINE BESYLATE 5 MG/1
5 TABLET ORAL DAILY
Qty: 90 TABLET | Refills: 1 | Status: SHIPPED | OUTPATIENT
Start: 2019-07-09 | End: 2019-07-31

## 2019-07-09 RX ORDER — FLUTICASONE PROPIONATE 50 MCG
2 SPRAY, SUSPENSION (ML) NASAL DAILY
Qty: 48 G | Refills: 3 | Status: SHIPPED | OUTPATIENT
Start: 2019-07-09 | End: 2020-10-30

## 2019-07-09 ASSESSMENT — PAIN SCALES - GENERAL: PAINLEVEL: NO PAIN (0)

## 2019-07-09 ASSESSMENT — MIFFLIN-ST. JEOR: SCORE: 1944.47

## 2019-07-09 NOTE — PROGRESS NOTES
"  SUBJECTIVE:   Bill Smith is a 68 year old male who presents for Preventive Visit.    Are you in the first 12 months of your Medicare Part B coverage?  No    Physical Health:    In general, how would you rate your overall physical health? good    Outside of work, how many days during the week do you exercise? 6-7 days/week    Outside of work, approximately how many minutes a day do you exercise?45-60 minutes    If you drink alcohol do you typically have >3 drinks per day or >7 drinks per week? No    Do you usually eat at least 4 servings of fruit and vegetables a day, include whole grains & fiber and avoid regularly eating high fat or \"junk\" foods? NO    Do you have any problems taking medications regularly?  No    Do you have any side effects from medications? none    Needs assistance for the following daily activities: no assistance needed    Which of the following safety concerns are present in your home?  none identified     Hearing impairment: No    In the past 6 months, have you been bothered by leaking of urine? no    Mental Health:    In general, how would you rate your overall mental or emotional health? good  PHQ-2 Score:      Do you feel safe in your environment? Yes    Do you have a Health Care Directive? Yes: Patient states has Advance Directive and will bring in a copy to clinic.    Additional concerns to address?  No    Fall risk:  Fallen 2 or more times in the past year?: No  Any fall with injury in the past year?: No    Cognitive Screenin) Repeat 3 items (Leader, Season, Table)    2) Clock draw: NORMAL  3) 3 item recall: Recalls 2 objects   Results: normal clock, able to recall 2 objects    Mini-CogTM Copyright MARÍA Raza. Licensed by the author for use in [facility]; reprinted with permission (mely@.[email or web address]). All rights reserved.      Do you have sleep apnea, excessive snoring or daytime drowsiness?: yes    Please abstract the following data from this visit with this patient " into the appropriate field in Epic:    [facility] in April 2019          Diabetes Follow-up      How often are you checking your blood sugar? Not at all    What time of day are you checking your blood sugars (select all that apply)?  na    Have you had any blood sugars above 200?  na    Have you had any blood sugars below 70? na    What symptoms do you notice when your blood sugar is low?  None    What concerns do you have today about your diabetes? None     Do you have any of these symptoms? (Select all that apply)  Numbness in feet and Burning in feet     Have you had a diabetic eye exam in the last 12 months? No    BP Readings from Last 2 Encounters:   07/09/19 153/89   05/29/19 158/88     Hemoglobin A1C (%)   Date Value   05/29/2019 6.3 (H)     LDL Cholesterol Calculated (mg/dL)   Date Value   12/26/2018 57   11/28/2012 80       Diabetes Management Resources    Reviewed and updated as needed this visit by clinical staff         Reviewed and updated as needed this visit by Provider        Social History     Tobacco Use     Smoking status: Never Smoker     Smokeless tobacco: Never Used   Substance Use Topics     Alcohol use: Yes                           Current providers sharing in care for this patient include:   Patient Care Team:  Nirmal Lubin MD as PCP - General (Family Practice)  Nirmal Lubin MD as Assigned PCP    The following health maintenance items are reviewed in Epic and correct as of today:  Health Maintenance   Topic Date Due     HEPATITIS C SCREENING  1950     MEDICARE ANNUAL WELLNESS VISIT  10/22/2013     FALL RISK ASSESSMENT  10/22/2013     ZOSTER IMMUNIZATION (2 of 3) 07/17/2014     ADVANCE CARE PLANNING  03/29/2017     PHQ-2  01/01/2019     INFLUENZA VACCINE (1) 09/01/2019     BMP  12/26/2019     MICROALBUMIN  05/29/2020     COLONOSCOPY  12/29/2021     DTAP/TDAP/TD IMMUNIZATION (3 - Td) 10/31/2022     LIPID  12/26/2023     AORTIC ANEURYSM SCREENING (SYSTEM ASSIGNED)   "Completed     IPV IMMUNIZATION  Aged Out     MENINGITIS IMMUNIZATION  Aged Out     Lab work is in process    ROS:  Constitutional, HEENT, cardiovascular, pulmonary, GI, , musculoskeletal, neuro, skin, endocrine and psych systems are negative, except as otherwise noted.    OBJECTIVE:   /83 (BP Location: Left arm, Patient Position: Chair, Cuff Size: Adult Regular)   Pulse 72   Temp 98.3  F (36.8  C) (Oral)   Resp 16   Ht 1.842 m (6' 0.5\")   Wt 113.9 kg (251 lb)   SpO2 93%   BMI 33.57 kg/m   Estimated body mass index is 33.71 kg/m  as calculated from the following:    Height as of 5/29/19: 1.842 m (6' 0.5\").    Weight as of 5/29/19: 114.3 kg (252 lb).  EXAM:   GENERAL: healthy, alert and no distress  NECK: no adenopathy, no asymmetry, masses, or scars and thyroid normal to palpation  RESP: lungs clear to auscultation - no rales, rhonchi or wheezes  CV: regular rate and rhythm, normal S1 S2, no S3 or S4, no murmur, click or rub, no peripheral edema and peripheral pulses strong  ABDOMEN: soft, nontender, no hepatosplenomegaly, no masses and bowel sounds normal  MS: no gross musculoskeletal defects noted, no edema  Diabetic foot exam: normal DP and PT pulses, no trophic changes or ulcerative lesions and normal sensory exam    Diagnostic Test Results:  Labs reviewed in Epic    ASSESSMENT / PLAN:   1. Encounter for Medicare annual wellness exam  As below.    2. Essential hypertension with goal blood pressure less than 140/90  Not controlled. Added amlodipine 5 mg daily. Recheck in 1 month.  - hydrALAZINE (APRESOLINE) 50 MG tablet; Take 1 tablet (50 mg) by mouth 3 times daily For blood pressure.  Dispense: 270 tablet; Refill: 3  - carvedilol (COREG) 25 MG tablet; Take 1 tablet (25 mg) by mouth 2 times daily (with meals) For blood pressure.  Dispense: 180 tablet; Refill: 1  - amLODIPine (NORVASC) 5 MG tablet; Take 1 tablet (5 mg) by mouth daily  Dispense: 90 tablet; Refill: 1    3. CARDIOVASCULAR SCREENING; LDL " "GOAL LESS THAN 130    - Lipid Profile (Chol, Trig, HDL, LDL calc); Future    4. Seasonal allergic rhinitis, unspecified trigger    - fluticasone (FLONASE) 50 MCG/ACT nasal spray; Spray 2 sprays into both nostrils daily  Dispense: 48 g; Refill: 3    End of Life Planning:  Patient currently has an advanced directive: No.  I have verified the patient's ablity to prepare an advanced directive/make health care decisions.  Literature was provided to assist patient in preparing an advanced directive.    COUNSELING:  Reviewed preventive health counseling, as reflected in patient instructions       Regular exercise       Healthy diet/nutrition       Vision screening    Estimated body mass index is 33.71 kg/m  as calculated from the following:    Height as of 5/29/19: 1.842 m (6' 0.5\").    Weight as of 5/29/19: 114.3 kg (252 lb).         reports that he has never smoked. He has never used smokeless tobacco.      Appropriate preventive services were discussed with this patient, including applicable screening as appropriate for cardiovascular disease, diabetes, osteopenia/osteoporosis, and glaucoma.  As appropriate for age/gender, discussed screening for colorectal cancer, prostate cancer, breast cancer, and cervical cancer. Checklist reviewing preventive services available has been given to the patient.    Reviewed patients plan of care and provided an AVS. The Basic Care Plan (routine screening as documented in Health Maintenance) for Bill meets the Care Plan requirement. This Care Plan has been established and reviewed with the Patient.    Counseling Resources:  ATP IV Guidelines  Pooled Cohorts Equation Calculator  Breast Cancer Risk Calculator  FRAX Risk Assessment  ICSI Preventive Guidelines  Dietary Guidelines for Americans, 2010  USDA's MyPlate  ASA Prophylaxis  Lung CA Screening    Nirmal Lubin MD, MD  [facility]  "

## 2019-07-22 DIAGNOSIS — Z13.6 CARDIOVASCULAR SCREENING; LDL GOAL LESS THAN 130: ICD-10-CM

## 2019-07-22 LAB
CHOLEST SERPL-MCNC: 120 MG/DL
HDLC SERPL-MCNC: 37 MG/DL
LDLC SERPL CALC-MCNC: 63 MG/DL
NONHDLC SERPL-MCNC: 83 MG/DL
TRIGL SERPL-MCNC: 100 MG/DL

## 2019-07-22 PROCEDURE — 36415 COLL VENOUS BLD VENIPUNCTURE: CPT | Performed by: FAMILY MEDICINE

## 2019-07-22 PROCEDURE — 80061 LIPID PANEL: CPT | Performed by: FAMILY MEDICINE

## 2019-07-31 ENCOUNTER — OFFICE VISIT (OUTPATIENT)
Dept: FAMILY MEDICINE | Facility: CLINIC | Age: 69
End: 2019-07-31
Payer: MEDICARE

## 2019-07-31 VITALS
HEIGHT: 73 IN | TEMPERATURE: 97.5 F | OXYGEN SATURATION: 94 % | WEIGHT: 245 LBS | SYSTOLIC BLOOD PRESSURE: 100 MMHG | BODY MASS INDEX: 32.47 KG/M2 | RESPIRATION RATE: 16 BRPM | DIASTOLIC BLOOD PRESSURE: 60 MMHG | HEART RATE: 75 BPM

## 2019-07-31 DIAGNOSIS — I10 ESSENTIAL HYPERTENSION WITH GOAL BLOOD PRESSURE LESS THAN 140/90: Primary | ICD-10-CM

## 2019-07-31 PROCEDURE — 99213 OFFICE O/P EST LOW 20 MIN: CPT | Performed by: FAMILY MEDICINE

## 2019-07-31 RX ORDER — AMLODIPINE BESYLATE 5 MG/1
5 TABLET ORAL DAILY
Qty: 90 TABLET | Refills: 1 | Status: SHIPPED | OUTPATIENT
Start: 2019-07-31 | End: 2019-09-24

## 2019-07-31 ASSESSMENT — MIFFLIN-ST. JEOR: SCORE: 1917.25

## 2019-07-31 ASSESSMENT — PAIN SCALES - GENERAL: PAINLEVEL: NO PAIN (0)

## 2019-07-31 NOTE — PROGRESS NOTES
"Subjective     Bill Smith is a 68 year old male who presents to clinic today for the following health issues:    HPI   Hypertension Follow-up      Do you check your blood pressure regularly outside of the clinic? Yes     Are you following a low salt diet? No    Are your blood pressures ever more than 140 on the top number (systolic) OR more   than 90 on the bottom number (diastolic), for example 140/90? Yes       Amount of exercise or physical activity: 6-7 days/week for an average of 15-30 minutes    Problems taking medications regularly: No    Medication side effects: none    Diet: regular (no restrictions)        Patient Active Problem List   Diagnosis     History of hernia repair     Parathyroid adenoma     Gout     CARDIOVASCULAR SCREENING; LDL GOAL LESS THAN 130     Insomnia     Anxiety     Advanced directives, counseling/discussion     Essential hypertension with goal blood pressure less than 140/90     Idiopathic chronic gout without tophus, unspecified site     Prediabetes     Past Surgical History:   Procedure Laterality Date     C APPENDECTOMY       HEAD & NECK SURGERY       HERNIA REPAIR       LAMINECT/DISCECTOMY, CERVICAL  1999       Social History     Tobacco Use     Smoking status: Never Smoker     Smokeless tobacco: Never Used   Substance Use Topics     Alcohol use: Yes     Family History   Problem Relation Age of Onset     Prostate Cancer Father      Alzheimer Disease Paternal Grandmother      Hypertension Maternal Uncle         AGE 60'S           Reviewed and updated as needed this visit by Provider         Review of Systems   ROS COMP: Constitutional, HEENT, cardiovascular, pulmonary, GI, , musculoskeletal, neuro, skin, endocrine and psych systems are negative, except as otherwise noted.      Objective    /66 (BP Location: Left arm, Patient Position: Sitting, Cuff Size: Adult Large)   Pulse 75   Temp 97.5  F (36.4  C) (Oral)   Resp 16   Ht 1.842 m (6' 0.5\")   Wt 111.1 kg (245 " "lb)   SpO2 94%   BMI 32.77 kg/m    Body mass index is 32.77 kg/m .  Physical Exam   GENERAL: healthy, alert and no distress  NECK: no adenopathy, no asymmetry, masses, or scars and thyroid normal to palpation  RESP: lungs clear to auscultation - no rales, rhonchi or wheezes  CV: regular rate and rhythm, normal S1 S2, no S3 or S4, no murmur, click or rub, no peripheral edema and peripheral pulses strong  ABDOMEN: soft, nontender, no hepatosplenomegaly, no masses and bowel sounds normal  MS: no gross musculoskeletal defects noted, no edema    Diagnostic Test Results:  Labs reviewed in Epic        Assessment & Plan     1. Essential hypertension with goal blood pressure less than 140/90  Controlled. Continue with current medications. RTC in 6 months for recheck. Reviewed low salt diet. If lowers, can trial stopping hydralazine discussed with patient.  - amLODIPine (NORVASC) 5 MG tablet; Take 1 tablet (5 mg) by mouth daily  Dispense: 90 tablet; Refill: 1     BMI:   Estimated body mass index is 32.77 kg/m  as calculated from the following:    Height as of this encounter: 1.842 m (6' 0.5\").    Weight as of this encounter: 111.1 kg (245 lb).           See Patient Instructions    Return in about 6 months (around 1/31/2020) for BP Recheck.    Nirmal Lubin MD, MD  [facility]      "

## 2019-08-22 ENCOUNTER — TELEPHONE (OUTPATIENT)
Dept: FAMILY MEDICINE | Facility: CLINIC | Age: 69
End: 2019-08-22

## 2019-08-22 DIAGNOSIS — M25.562 PAIN IN BOTH KNEES, UNSPECIFIED CHRONICITY: Primary | ICD-10-CM

## 2019-08-22 DIAGNOSIS — M25.561 PAIN IN BOTH KNEES, UNSPECIFIED CHRONICITY: Primary | ICD-10-CM

## 2019-08-22 NOTE — TELEPHONE ENCOUNTER
This writer attempted to contact Bill on 08/22/19      Reason for call triage and left message.      If patient calls back:   Registered Nurse called. Follow Triage Call workflow        Ann Aaron RN       Tried calling number that patient left in message 2 x but only got busy signal.

## 2019-08-22 NOTE — TELEPHONE ENCOUNTER
Reason for Call: Request for an order    Order or referral being requested:  Wants cortisone in knees, amlodipine is causing sweelling in feet but working well for blood pressure.    Date needed: as soon as possible    Has the patient been seen by the PCP for this problem? YES    Additional comments: Please call back and advise.     Phone number Patient can be reached at:  Home number on file [phone] (home)    Best Time:  any    Can we leave a detailed message on this number?  YES    Call taken on 8/22/2019 at 11:40 AM by Margi Herrera

## 2019-08-23 NOTE — TELEPHONE ENCOUNTER
Patient is having swelling in ankles since starting amlodipine. Noting that his BP has been great with 129-131/78. No chest pain of breathing issues.    He is wondering if a change in dose needs to be done.    He is also wondering about getting cortisone injections in his knees for pain.    Mary Crisostomo RN, [facility] Triage

## 2019-08-23 NOTE — TELEPHONE ENCOUNTER
"This writer attempted to contact \"Alex Smith\" on 08/23/19      Reason for call return call and left message.      If patient calls back:   Registered Nurse called. Follow Triage Call workflow      Fernanda Murcia RN.  "

## 2019-08-23 NOTE — TELEPHONE ENCOUNTER
Patient contacted and informed of the below per provider documentation. Patient verbalizes understanding.     Fernanda Murcia RN

## 2019-08-23 NOTE — TELEPHONE ENCOUNTER
Patient can try to cut the amlodipine in half to see if the swelling improves and also needs to monitor bp while on lower dose. Goal is <140/90.  Patient referred to Sports Medicine for evaluation of the knees and possible injections. Patient can call [phone] to schedule.    Nirmal Lubin MD

## 2019-08-23 NOTE — TELEPHONE ENCOUNTER
returned call    Best number to reach caller: [phone]     Is it ok to leave a detailed message: YES    Transferring to rn line

## 2019-08-29 ENCOUNTER — OFFICE VISIT (OUTPATIENT)
Dept: ORTHOPEDICS | Facility: CLINIC | Age: 69
End: 2019-08-29
Payer: MEDICARE

## 2019-08-29 ENCOUNTER — ANCILLARY PROCEDURE (OUTPATIENT)
Dept: GENERAL RADIOLOGY | Facility: CLINIC | Age: 69
End: 2019-08-29
Attending: FAMILY MEDICINE
Payer: MEDICARE

## 2019-08-29 VITALS — BODY MASS INDEX: 32.47 KG/M2 | WEIGHT: 245 LBS | HEIGHT: 73 IN

## 2019-08-29 DIAGNOSIS — M17.11 PRIMARY OSTEOARTHRITIS OF RIGHT KNEE: ICD-10-CM

## 2019-08-29 DIAGNOSIS — M17.12 PRIMARY OSTEOARTHRITIS OF LEFT KNEE: Primary | ICD-10-CM

## 2019-08-29 DIAGNOSIS — M17.12 PRIMARY OSTEOARTHRITIS OF LEFT KNEE: ICD-10-CM

## 2019-08-29 PROCEDURE — 73564 X-RAY EXAM KNEE 4 OR MORE: CPT | Mod: LT | Performed by: RADIOLOGY

## 2019-08-29 PROCEDURE — 20610 DRAIN/INJ JOINT/BURSA W/O US: CPT | Mod: 50 | Performed by: FAMILY MEDICINE

## 2019-08-29 PROCEDURE — 99207 ZZC DROP WITH A PROCEDURE: CPT | Performed by: FAMILY MEDICINE

## 2019-08-29 RX ORDER — TRIAMCINOLONE ACETONIDE 40 MG/ML
40 INJECTION, SUSPENSION INTRA-ARTICULAR; INTRAMUSCULAR
Status: DISCONTINUED | OUTPATIENT
Start: 2019-08-29 | End: 2023-08-02

## 2019-08-29 RX ADMIN — TRIAMCINOLONE ACETONIDE 40 MG: 40 INJECTION, SUSPENSION INTRA-ARTICULAR; INTRAMUSCULAR at 11:02

## 2019-08-29 ASSESSMENT — MIFFLIN-ST. JEOR: SCORE: 1917.25

## 2019-08-29 ASSESSMENT — PAIN SCALES - GENERAL: PAINLEVEL: MODERATE PAIN (5)

## 2019-08-29 NOTE — PROGRESS NOTES
"      [facility]  8/29/2019    Bill Smith's chief complaint for this visit includes:  Chief Complaint   Patient presents with     Consult     Bilateral knee pain, no specific injury, no hx of surgery, increased pain with weight bearing and going up stairs       PCP: Nirmal Lubin    Referring Provider:  No referring provider defined for this encounter.    Ht 1.842 m (6' 0.5\")   Wt 111.1 kg (245 lb)   BMI 32.77 kg/m    Moderate Pain (5)       Reason for visit:     What part of your body is injured / painful?  bilateral knee    What caused the injury /pain? No inciting event     How long ago did your injury occur or pain begin? problem is longstanding    What are your most bothersome symptoms? Pain    How would you characterize your symptom?  aching    What makes your symptoms better? Rest     What makes your symptoms worse? Movement    Have you been previously seen for this problem? No    Medical History:    Any recent changes to your medical history? No    Any new medication prescribed since last visit? No    Have you had surgery on this body part before? No      Review of Systems:    Do you have fever, chills, weight loss? No    Do you have any vision problems? No    Do you have any chest pain or edema? No    Do you have any shortness of breath or wheezing?  No    Do you have stomach problems? No    Do you have any numbness or focal weakness? No    Do you have diabetes? Currently under control     Do you have problems with bleeding or clotting? No    Do you have an rashes or other skin lesions? No       Large Joint Injection/Arthocentesis: bilateral knee  Date/Time: 8/29/2019 11:02 AM  Performed by: Patrick Patel DO  Authorized by: Patrick Patel DO     Indications:  Pain  Needle Size:  25 G  Approach:  Lateral  Location:  Knee  Laterality:  Bilateral      Medications (Right):  40 mg triamcinolone 40 MG/ML  Medications (Left):  40 mg triamcinolone 40 MG/ML  Outcome:  Tolerated well, no " immediate complications  Procedure discussed: discussed risks, benefits, and alternatives    Consent Given by:  Patient  Timeout: timeout called immediately prior to procedure    Prep: patient was prepped and draped in usual sterile fashion        CHIEF COMPLAINT:  Consult (Bilateral knee pain, no specific injury, no hx of surgery, increased pain with weight bearing and going up stairs  )       HISTORY OF PRESENT ILLNESS  Mr. Smith is a pleasant 68 year old year old male who presents to clinic today with bilateral knee osteoarthritis.  He is seen at the request of Dr. Nimral Lubin.    Alex has had bilateral knee pain for years.  Most of his pain is anterior and medial, he feels worse with increased walking, worse as the day goes on.  He has quite a bit of trouble getting up from a seated position and getting up stairs.  He is pretty active, has a Nordic track and does Nordic skiing at home, he also does bike and treadmill.  He has tried ibuprofen, ice, some topical treatments.      Additional history: as documented    MEDICAL HISTORY  Patient Active Problem List   Diagnosis     History of hernia repair     Parathyroid adenoma     Gout     CARDIOVASCULAR SCREENING; LDL GOAL LESS THAN 130     Insomnia     Anxiety     Advanced directives, counseling/discussion     Essential hypertension with goal blood pressure less than 140/90     Idiopathic chronic gout without tophus, unspecified site     Prediabetes       Current Outpatient Medications   Medication Sig Dispense Refill     acetaminophen (TYLENOL) 325 MG tablet Take 650 mg by mouth       allopurinol (ZYLOPRIM) 100 MG tablet Take 1 tablet (100 mg) by mouth 2 times daily For gout. 180 tablet 3     amLODIPine (NORVASC) 5 MG tablet Take 1 tablet (5 mg) by mouth daily 90 tablet 1     ascorbic acid (VITAMIN C) 1000 MG TABS Take 1 tablet by mouth daily.       blood glucose (NO BRAND SPECIFIED) test strip Supplies: test strips, dispense  100. Lab Results       Component           "      Value               Date                       A1C                      6.0                 02/06/2017       carvedilol (COREG) 25 MG tablet Take 1 tablet (25 mg) by mouth 2 times daily (with meals) For blood pressure. 180 tablet 1     fluticasone (FLONASE) 50 MCG/ACT nasal spray Spray 2 sprays into both nostrils daily 48 g 3     hydrALAZINE (APRESOLINE) 50 MG tablet Take 1 tablet (50 mg) by mouth 3 times daily For blood pressure. 270 tablet 3     indomethacin (INDOCIN) 50 MG capsule Take 50 mg by mouth       losartan (COZAAR) 100 MG tablet Take 1 tablet (100 mg) by mouth daily For blood pressure. 90 tablet 3     MULTIPLE VITAMIN PO Take 1 tablet by mouth daily.       traZODone (DESYREL) 50 MG tablet Take 50 mg by mouth         Allergies   Allergen Reactions     [place] Grass Other (See Comments)     Coughing sneezing     Lisinopril Cough     Molds & Smuts Other (See Comments)     Coughing sneezing  Coughing sneezing       Shellfish Allergy Other (See Comments)     Causes gout       Family History   Problem Relation Age of Onset     Prostate Cancer Father      Alzheimer Disease Paternal Grandmother      Hypertension Maternal Uncle         AGE 60'S       Additional medical/Social/Surgical histories reviewed in [facility] and updated as appropriate.          PHYSICAL EXAM  Vitals:    08/29/19 1015   Weight: 111.1 kg (245 lb)   Height: 1.842 m (6' 0.5\")     General  - normal appearance, in no obvious distress  CV  - normal popliteal pulse  Pulm  - normal respiratory pattern, non-labored  Musculoskeletal - right and left knee  - stance: mildly antalgic gait, slow to rise from a seated position  - inspection: generalized swelling, trace effusion  - palpation: medial joint line tenderness, patella and patellar tendon non-tender, normal popliteal pulse  - ROM: 100 degrees flexion, 0 degrees extension, painful active ROM  - strength: 5/5 in flexion, 5/5 in extension    Neuro  - no sensory or motor deficit, grossly normal " coordination, normal muscle tone  Skin  - no ecchymosis, erythema, warmth, or induration, no obvious rash  Psych  - interactive, appropriate, normal mood and affect               ASSESSMENT & PLAN  Mr. Smith is a 68 year old year old male who presents to clinic today with bilateral knee pain possible secondary to osteoarthritis.    We reviewed his prior knee x-ray in the room with him which does not feel mild to moderate bilateral tricompartmental osteoarthritis.  Alex and I had a good discussion centering around the spectrum of treatment options for osteoarthritis that preclude surgery.  We discussed nonoperative treatment with pain relievers, icing, low impact exercise, and weight loss.  We also talked about the role of injection therapy with corticosteroids and hyaluronic acid, as well as the potential role of biologics.    We did inject his knees today (see procedure note).    If his pain does not resolve with the injections we could consider MR imaging.    Thank you for allowing me to participate in Alex's care.    PROCEDURE    Knee Injection - Intraarticular  The patient was informed of the risks and the benefits of the procedure and a written consent was signed.  The patient s left knee was prepped with chlorhexidine in sterile fashion.   40 mg of triamcinolone suspension was drawn up into a 5 mL syringe with 4 mL of 1% lidocaine.  Injection was performed using substerile technique.  A 1.5-inch 25-gauge needle was used to enter the lateral aspect of the left knee.  Injection performed successfully without difficulty.  There were no complications. The patient tolerated the procedure well. There was negligible bleeding.   The patient s right knee was prepped with chlorhexidine in sterile fashion.   40 mg of triamcinolone suspension was drawn up into a 5 mL syringe with 4 mL of 1% lidocaine.  Injection was performed using substerile technique.  A 1.5-inch 25-gauge needle was used to enter the lateral aspect of  the right knee.  Injection performed successfully without difficulty.  There were no complications. The patient tolerated the procedure well. There was negligible bleeding.   The patient was instructed to ice the knee upon leaving clinic and refrain from overuse over the next 3 days.   The patient was instructed to call or go to the emergency room with any unusual pain, swelling, redness, or if otherwise concerned.        Patrick Patel DO, [facility]  Primary Care Sports Medicine       This note was constructed using Dragon dictation software, please excuse any minor errors in spelling, grammar, or syntax.

## 2019-08-29 NOTE — LETTER
"    8/29/2019         RE: Bill Smith  [address]  [place] [address]        Dear Colleague,    Thank you for referring your patient, Bill Smith, to the [facility]. Please see a copy of my visit note below.          [facility]  8/29/2019    Bill Smith's chief complaint for this visit includes:  Chief Complaint   Patient presents with     Consult     Bilateral knee pain, no specific injury, no hx of surgery, increased pain with weight bearing and going up stairs       PCP: Nirmal Lubin    Referring Provider:  No referring provider defined for this encounter.    Ht 1.842 m (6' 0.5\")   Wt 111.1 kg (245 lb)   BMI 32.77 kg/m     Moderate Pain (5)       Reason for visit:     What part of your body is injured / painful?  bilateral knee    What caused the injury /pain? No inciting event     How long ago did your injury occur or pain begin? problem is longstanding    What are your most bothersome symptoms? Pain    How would you characterize your symptom?  aching    What makes your symptoms better? Rest     What makes your symptoms worse? Movement    Have you been previously seen for this problem? No    Medical History:    Any recent changes to your medical history? No    Any new medication prescribed since last visit? No    Have you had surgery on this body part before? No      Review of Systems:    Do you have fever, chills, weight loss? No    Do you have any vision problems? No    Do you have any chest pain or edema? No    Do you have any shortness of breath or wheezing?  No    Do you have stomach problems? No    Do you have any numbness or focal weakness? No    Do you have diabetes? Currently under control     Do you have problems with bleeding or clotting? No    Do you have an rashes or other skin lesions? No       Large Joint Injection/Arthocentesis: bilateral knee  Date/Time: 8/29/2019 11:02 AM  Performed by: Patrick Patel DO  Authorized by: Patrick Patel " Larry, DO     Indications:  Pain  Needle Size:  25 G  Approach:  Lateral  Location:  Knee  Laterality:  Bilateral      Medications (Right):  40 mg triamcinolone 40 MG/ML  Medications (Left):  40 mg triamcinolone 40 MG/ML  Outcome:  Tolerated well, no immediate complications  Procedure discussed: discussed risks, benefits, and alternatives    Consent Given by:  Patient  Timeout: timeout called immediately prior to procedure    Prep: patient was prepped and draped in usual sterile fashion        CHIEF COMPLAINT:  Consult (Bilateral knee pain, no specific injury, no hx of surgery, increased pain with weight bearing and going up stairs  )       HISTORY OF PRESENT ILLNESS  Mr. Smith is a pleasant 68 year old year old male who presents to clinic today with bilateral knee osteoarthritis.  He is seen at the request of Dr. Nirmal Lubin.    Alex has had bilateral knee pain for years.  Most of his pain is anterior and medial, he feels worse with increased walking, worse as the day goes on.  He has quite a bit of trouble getting up from a seated position and getting up stairs.  He is pretty active, has a Nordic track and does Nordic skiing at home, he also does bike and treadmill.  He has tried ibuprofen, ice, some topical treatments.      Additional history: as documented    MEDICAL HISTORY  Patient Active Problem List   Diagnosis     History of hernia repair     Parathyroid adenoma     Gout     CARDIOVASCULAR SCREENING; LDL GOAL LESS THAN 130     Insomnia     Anxiety     Advanced directives, counseling/discussion     Essential hypertension with goal blood pressure less than 140/90     Idiopathic chronic gout without tophus, unspecified site     Prediabetes       Current Outpatient Medications   Medication Sig Dispense Refill     acetaminophen (TYLENOL) 325 MG tablet Take 650 mg by mouth       allopurinol (ZYLOPRIM) 100 MG tablet Take 1 tablet (100 mg) by mouth 2 times daily For gout. 180 tablet 3     amLODIPine (NORVASC) 5 MG  "tablet Take 1 tablet (5 mg) by mouth daily 90 tablet 1     ascorbic acid (VITAMIN C) 1000 MG TABS Take 1 tablet by mouth daily.       blood glucose (NO BRAND SPECIFIED) test strip Supplies: test strips, dispense  100. Lab Results       Component                Value               Date                       A1C                      6.0                 02/06/2017       carvedilol (COREG) 25 MG tablet Take 1 tablet (25 mg) by mouth 2 times daily (with meals) For blood pressure. 180 tablet 1     fluticasone (FLONASE) 50 MCG/ACT nasal spray Spray 2 sprays into both nostrils daily 48 g 3     hydrALAZINE (APRESOLINE) 50 MG tablet Take 1 tablet (50 mg) by mouth 3 times daily For blood pressure. 270 tablet 3     indomethacin (INDOCIN) 50 MG capsule Take 50 mg by mouth       losartan (COZAAR) 100 MG tablet Take 1 tablet (100 mg) by mouth daily For blood pressure. 90 tablet 3     MULTIPLE VITAMIN PO Take 1 tablet by mouth daily.       traZODone (DESYREL) 50 MG tablet Take 50 mg by mouth         Allergies   Allergen Reactions     Bermuda Grass Other (See Comments)     Coughing sneezing     Lisinopril Cough     Molds & Smuts Other (See Comments)     Coughing sneezing  Coughing sneezing       Shellfish Allergy Other (See Comments)     Causes gout       Family History   Problem Relation Age of Onset     Prostate Cancer Father      Alzheimer Disease Paternal Grandmother      Hypertension Maternal Uncle         AGE 60'S       Additional medical/Social/Surgical histories reviewed in [facility] and updated as appropriate.          PHYSICAL EXAM  Vitals:    08/29/19 1015   Weight: 111.1 kg (245 lb)   Height: 1.842 m (6' 0.5\")     General  - normal appearance, in no obvious distress  CV  - normal popliteal pulse  Pulm  - normal respiratory pattern, non-labored  Musculoskeletal - right and left knee  - stance: mildly antalgic gait, slow to rise from a seated position  - inspection: generalized swelling, trace effusion  - palpation: medial " joint line tenderness, patella and patellar tendon non-tender, normal popliteal pulse  - ROM: 100 degrees flexion, 0 degrees extension, painful active ROM  - strength: 5/5 in flexion, 5/5 in extension    Neuro  - no sensory or motor deficit, grossly normal coordination, normal muscle tone  Skin  - no ecchymosis, erythema, warmth, or induration, no obvious rash  Psych  - interactive, appropriate, normal mood and affect               ASSESSMENT & PLAN  Mr. Smith is a 68 year old year old male who presents to clinic today with bilateral knee pain possible secondary to osteoarthritis.    We reviewed his prior knee x-ray in the room with him which does not feel mild to moderate bilateral tricompartmental osteoarthritis.  Alex and I had a good discussion centering around the spectrum of treatment options for osteoarthritis that preclude surgery.  We discussed nonoperative treatment with pain relievers, icing, low impact exercise, and weight loss.  We also talked about the role of injection therapy with corticosteroids and hyaluronic acid, as well as the potential role of biologics.    We did inject his knees today (see procedure note).    If his pain does not resolve with the injections we could consider MR imaging.    Thank you for allowing me to participate in Alex's care.    PROCEDURE    Knee Injection - Intraarticular  The patient was informed of the risks and the benefits of the procedure and a written consent was signed.  The patient s left knee was prepped with chlorhexidine in sterile fashion.   40 mg of triamcinolone suspension was drawn up into a 5 mL syringe with 4 mL of 1% lidocaine.  Injection was performed using substerile technique.  A 1.5-inch 25-gauge needle was used to enter the lateral aspect of the left knee.  Injection performed successfully without difficulty.  There were no complications. The patient tolerated the procedure well. There was negligible bleeding.   The patient s right knee was  prepped with chlorhexidine in sterile fashion.   40 mg of triamcinolone suspension was drawn up into a 5 mL syringe with 4 mL of 1% lidocaine.  Injection was performed using substerile technique.  A 1.5-inch 25-gauge needle was used to enter the lateral aspect of the right knee.  Injection performed successfully without difficulty.  There were no complications. The patient tolerated the procedure well. There was negligible bleeding.   The patient was instructed to ice the knee upon leaving clinic and refrain from overuse over the next 3 days.   The patient was instructed to call or go to the emergency room with any unusual pain, swelling, redness, or if otherwise concerned.        Patrick Patel DO, [facility]  [facility]       This note was constructed using Dragon dictation software, please excuse any minor errors in spelling, grammar, or syntax.      Again, thank you for allowing me to participate in the care of your patient.        Sincerely,        Patrick Patel DO

## 2019-09-24 ENCOUNTER — ALLIED HEALTH/NURSE VISIT (OUTPATIENT)
Dept: NURSING | Facility: CLINIC | Age: 69
End: 2019-09-24
Payer: MEDICARE

## 2019-09-24 DIAGNOSIS — I10 ESSENTIAL HYPERTENSION WITH GOAL BLOOD PRESSURE LESS THAN 140/90: ICD-10-CM

## 2019-09-24 DIAGNOSIS — Z23 NEED FOR PROPHYLACTIC VACCINATION AND INOCULATION AGAINST INFLUENZA: Primary | ICD-10-CM

## 2019-09-24 PROCEDURE — G0008 ADMIN INFLUENZA VIRUS VAC: HCPCS

## 2019-09-24 PROCEDURE — 99207 ZZC NO CHARGE NURSE ONLY: CPT

## 2019-09-24 PROCEDURE — 90662 IIV NO PRSV INCREASED AG IM: CPT

## 2019-09-24 RX ORDER — AMLODIPINE BESYLATE 2.5 MG/1
2.5 TABLET ORAL DAILY
Qty: 90 TABLET | Refills: 3 | Status: SHIPPED | OUTPATIENT
Start: 2019-09-24 | End: 2020-02-04

## 2019-12-31 DIAGNOSIS — M25.562 PAIN IN BOTH KNEES, UNSPECIFIED CHRONICITY: Primary | ICD-10-CM

## 2019-12-31 DIAGNOSIS — M25.561 PAIN IN BOTH KNEES, UNSPECIFIED CHRONICITY: Primary | ICD-10-CM

## 2019-12-31 RX ORDER — INDOMETHACIN 50 MG/1
50 CAPSULE ORAL 3 TIMES DAILY PRN
Qty: 60 CAPSULE | Refills: 3 | Status: SHIPPED | OUTPATIENT
Start: 2019-12-31 | End: 2020-08-11

## 2019-12-31 NOTE — TELEPHONE ENCOUNTER
Routing refill request to provider for review/approval because:  Labs not current:  CBC, ALT, Uric, creatinine  Medication is reported/historical        Olinda Mckeon RN

## 2019-12-31 NOTE — TELEPHONE ENCOUNTER
"Requested Prescriptions   Pending Prescriptions Disp Refills     indomethacin (INDOCIN) 50 MG capsule        Last Written Prescription Date:  na  Last Fill Quantity: na,   # refills: na  Last Office Visit: 07/31/19-[facility]  Future Office visit:       Routing refill request to provider for review/approval because:  Drug not active on patient's medication list       Sig: Take 1 capsule (50 mg) by mouth       Gout Agents Protocol Failed - 12/31/2019  8:50 AM        Failed - CBC on file in past 12 months     Recent Labs   Lab Test 07/17/12  1100   WBC 11.2*   RBC 5.28   HGB 16.5   HCT 47.7                    Failed - ALT on file in past 12 months     No lab results found.          Failed - Has Uric Acid on file in past 12 months and value is less than 6     No lab results found.  If level is 6mg/dL or greater, ok to refill one time and refer to provider.           Failed - Normal serum creatinine on file in the past 12 months     Recent Labs   Lab Test 12/26/18  1116   CR 0.81             Passed - Recent (12 mo) or future (30 days) visit within the authorizing provider's specialty     Patient has had an office visit with the authorizing provider or a provider within the authorizing providers department within the previous 12 mos or has a future within next 30 days. See \"Patient Info\" tab in inbasket, or \"Choose Columns\" in Meds & Orders section of the refill encounter.              Passed - Medication is active on med list        Passed - Patient is age 18 or older          "

## 2019-12-31 NOTE — TELEPHONE ENCOUNTER
Routing refill request to provider for review/approval because:  Medication is reported/historical    Arcadio Waters RN, BSN, PHN

## 2019-12-31 NOTE — TELEPHONE ENCOUNTER
.Reason for Call:  Medication or medication refill:    Do you use a [facility]?  Name of the pharmacy and phone number for the current request:  [facility] [phone]    Name of the medication requested: indomethacin (INDOCIN) 50 MG capsule     Other request: none    Can we leave a detailed message on this number? Not Applicable    Phone number patient can be reached at:  phone number:  [phone]    Best Time: any    Call taken on 12/31/2019 at 8:19 AM by Luz Elena Bender

## 2019-12-31 NOTE — TELEPHONE ENCOUNTER
Reason for Call:  Other prescription    Detailed comments: Pt calling for he is requesting indomethacin Rx and would like that sent to the [facility] in [place] as soon as possible today for Pt is out.  He would like a call back to confirm Rx has been sent.    Phone Number Patient can be reached at: Work number on file:  [phone] (work)    Best Time: anytime    Can we leave a detailed message on this number? YES    Call taken on 12/31/2019 at 8:48 AM by Enrique Turner

## 2020-02-03 ENCOUNTER — TELEPHONE (OUTPATIENT)
Dept: FAMILY MEDICINE | Facility: CLINIC | Age: 70
End: 2020-02-03

## 2020-02-03 DIAGNOSIS — I10 ESSENTIAL HYPERTENSION WITH GOAL BLOOD PRESSURE LESS THAN 140/90: ICD-10-CM

## 2020-02-03 NOTE — TELEPHONE ENCOUNTER
Reason for call:  Medication   If this is a refill request, has the caller requested the refill from the pharmacy already? No  Will the patient be using a [facility]? No  Name of the pharmacy and phone number for the current [facility]: [facility] [phone]    Name of the medication requested: losartan    Other request: patient asking if he should have a water pill added to his meds    Patient feels his amlodipine is causing water retention  He stopped that medication a week ago    Call to discuss    Phone number to reach patient:  [phone]    Best Time:  any    Can we leave a detailed message on this number?  YES

## 2020-02-04 RX ORDER — CHLORTHALIDONE 25 MG/1
25 TABLET ORAL EVERY MORNING
Qty: 90 TABLET | Refills: 3 | Status: SHIPPED | OUTPATIENT
Start: 2020-02-04 | End: 2020-08-11

## 2020-02-04 RX ORDER — LOSARTAN POTASSIUM 100 MG/1
100 TABLET ORAL DAILY
Qty: 90 TABLET | Refills: 3 | Status: SHIPPED | OUTPATIENT
Start: 2020-02-04 | End: 2020-08-11

## 2020-02-04 NOTE — TELEPHONE ENCOUNTER
Patient called. Okay to discontinue amlodipine. Start chlorthalidone. Discussed can potentially cause gout flares. Patient will monitor. Gout has been controlled with current allopurinol. Patient will RTC in May for blood pressure visit.    Nirmal Lubin MD

## 2020-02-16 DIAGNOSIS — I10 ESSENTIAL HYPERTENSION WITH GOAL BLOOD PRESSURE LESS THAN 140/90: ICD-10-CM

## 2020-02-16 DIAGNOSIS — M1A.00X0 IDIOPATHIC CHRONIC GOUT WITHOUT TOPHUS, UNSPECIFIED SITE: ICD-10-CM

## 2020-02-16 NOTE — TELEPHONE ENCOUNTER
"Requested Prescriptions   Pending Prescriptions Disp Refills     allopurinol (ZYLOPRIM) 100 MG tablet [Pharmacy Med Name: Allopurinol 100 MG Oral Tablet]  0     Sig: TAKE 1 TABLET BY MOUTH TWICE DAILY FOR  GOUT         Last Written Prescription Date:  5/29/19  Last Fill Quantity: 180,  # refills: 3   Last Office Visit with [facility], [facility] or [facility] prescribing provider:  7/31/19   Future Office Visit:         Gout Agents Protocol Failed - 2/16/2020  9:26 AM        Failed - CBC on file in past 12 months     Recent Labs   Lab Test 07/17/12  1100   WBC 11.2*   RBC 5.28   HGB 16.5   HCT 47.7                    Failed - ALT on file in past 12 months     No lab results found.          Failed - Has Uric Acid on file in past 12 months and value is less than 6     No lab results found.  If level is 6mg/dL or greater, ok to refill one time and refer to provider.           Failed - Normal serum creatinine on file in the past 12 months     Recent Labs   Lab Test 12/26/18  1116   CR 0.81             Passed - Recent (12 mo) or future (30 days) visit within the authorizing provider's specialty     Patient has had an office visit with the authorizing provider or a provider within the authorizing providers department within the previous 12 mos or has a future within next 30 days. See \"Patient Info\" tab in inbasket, or \"Choose Columns\" in Meds & Orders section of the refill encounter.              Passed - Medication is active on med list        Passed - Patient is age 18 or older              Ronald Faarax  [facility]  "

## 2020-02-17 RX ORDER — ALLOPURINOL 100 MG/1
TABLET ORAL
Qty: 180 TABLET | Refills: 3 | Status: SHIPPED | OUTPATIENT
Start: 2020-02-17 | End: 2020-08-11

## 2020-02-17 RX ORDER — CARVEDILOL 25 MG/1
25 TABLET ORAL 2 TIMES DAILY WITH MEALS
Qty: 180 TABLET | Refills: 0 | Status: SHIPPED | OUTPATIENT
Start: 2020-02-17 | End: 2020-05-15

## 2020-02-17 NOTE — TELEPHONE ENCOUNTER
"                                                                                                                carvedilol (COREG) 25 MG tablet  Last Written Prescription Date:  07/09/19  Last Fill Quantity: 180,  # refills: 1   Last Office Visit with [facility] or [facility] prescribing provider:  07/31/19-   Future Office Visit:    180 tablet 1     Sig: Take 1 tablet (25 mg) by mouth 2 times daily (with meals) For blood pressure.       Beta-Blockers Protocol Passed - 2/17/2020  2:30 PM        Passed - Blood pressure under 140/90 in past 12 months     BP Readings from Last 3 Encounters:   07/31/19 100/60   07/09/19 143/83   05/29/19 158/88                 Passed - Patient is age 6 or older        Passed - Recent (12 mo) or future (30 days) visit within the authorizing provider's specialty     Patient has had an office visit with the authorizing provider or a provider within the authorizing providers department within the previous 12 mos or has a future within next 30 days. See \"Patient Info\" tab in inbasket, or \"Choose Columns\" in Meds & Orders section of the refill encounter.              Passed - Medication is active on med list          "

## 2020-02-17 NOTE — TELEPHONE ENCOUNTER
Routing refill request to provider for review/approval because: Allopurinol      Prescription approved per [facility] Refill Protocol-Gui Aaron RN

## 2020-05-13 DIAGNOSIS — I10 ESSENTIAL HYPERTENSION WITH GOAL BLOOD PRESSURE LESS THAN 140/90: ICD-10-CM

## 2020-05-15 RX ORDER — CARVEDILOL 25 MG/1
TABLET ORAL
Qty: 180 TABLET | Refills: 0 | Status: SHIPPED | OUTPATIENT
Start: 2020-05-15 | End: 2020-08-11

## 2020-05-15 NOTE — TELEPHONE ENCOUNTER
Prescription approved per [facility] Refill Protocol.  Fernanda Murcia RN  [facility]

## 2020-07-20 ENCOUNTER — OFFICE VISIT (OUTPATIENT)
Dept: ORTHOPEDICS | Facility: CLINIC | Age: 70
End: 2020-07-20
Payer: MEDICARE

## 2020-07-20 VITALS — BODY MASS INDEX: 32.47 KG/M2 | HEIGHT: 73 IN | WEIGHT: 245 LBS

## 2020-07-20 DIAGNOSIS — M17.11 PRIMARY OSTEOARTHRITIS OF RIGHT KNEE: ICD-10-CM

## 2020-07-20 DIAGNOSIS — M17.12 PRIMARY OSTEOARTHRITIS OF LEFT KNEE: Primary | ICD-10-CM

## 2020-07-20 PROCEDURE — 20610 DRAIN/INJ JOINT/BURSA W/O US: CPT | Mod: 50 | Performed by: FAMILY MEDICINE

## 2020-07-20 PROCEDURE — 99207 ZZC DROP WITH A PROCEDURE: CPT | Performed by: FAMILY MEDICINE

## 2020-07-20 RX ORDER — TRIAMCINOLONE ACETONIDE 40 MG/ML
40 INJECTION, SUSPENSION INTRA-ARTICULAR; INTRAMUSCULAR
Status: DISCONTINUED | OUTPATIENT
Start: 2020-07-20 | End: 2023-08-02

## 2020-07-20 RX ADMIN — TRIAMCINOLONE ACETONIDE 40 MG: 40 INJECTION, SUSPENSION INTRA-ARTICULAR; INTRAMUSCULAR at 16:12

## 2020-07-20 ASSESSMENT — MIFFLIN-ST. JEOR: SCORE: 1912.25

## 2020-07-20 NOTE — PROGRESS NOTES
"Mr. Smith is here for bilateral knee injections.  I last saw him just under a year ago, at that visit I injected his knees for bilateral knee osteoarthritis.  These helped him until just recently.  He lives in [place] for the colder months, he recently returned from his ranch home in [place] to his multi-level home in [place].  He noticed increased pain with climbing steps.    Scribed by Melanie Aguillon ATC for Dr. Patel on 7/20/20 at 4:15 PM, based on the providers statements to me.           [facility] FOLLOW-UP VISIT 7/20/2020    Bill Smith's chief complaint for this visit includes:  Chief Complaint   Patient presents with     RECHECK     bilateral knee injections, last injection were 8/2019      PCP: Nirmal Lubin    Referring Provider:  No referring provider defined for this encounter.    Ht 1.842 m (6' 0.5\")   Wt 111.1 kg (245 lb)   BMI 32.77 kg/m    Data Unavailable       Interval History:     Follow up reason: bilateral knee pain     Following Therapeutic Plan: Yes     Pain: Unchanged    Function: Unchanged    Medical History:    Any recent changes to your medical history? No    Any new medication prescribed since last visit? No    Review of Systems:    Do you have fever, chills, weight loss? No    Do you have any vision problems? No    Do you have any chest pain or edema? No    Do you have any shortness of breath or wheezing?  No    Do you have stomach problems? No    Do you have any urinary track issues? No    Do you have any numbness or focal weakness? No    Do you have diabetes? No    Do you have problems with bleeding or clotting? No    Do you have an rashes or other skin lesions? No    Large Joint Injection/Arthocentesis: bilateral knee    Date/Time: 7/20/2020 4:12 PM  Performed by: Patrick Patel DO  Authorized by: Patrick Patel DO     Indications:  Pain  Needle Size:  22 G  Guidance: landmark guided    Approach:  Lateral  Location:  Knee  Laterality:  " Bilateral      Medications (Right):  40 mg triamcinolone 40 MG/ML  Medications (Left):  40 mg triamcinolone 40 MG/ML  Outcome:  Tolerated well, no immediate complications  Procedure discussed: discussed risks, benefits, and alternatives    Consent Given by:  Patient  Timeout: timeout called immediately prior to procedure    Prep: patient was prepped and draped in usual sterile fashion       PROCEDURE    Knee Injection - Intraarticular    The patient was informed of the risks and the benefits of the procedure and a written consent was signed.    The patient s left knee was prepped with chlorhexidine in sterile fashion.   40 mg of triamcinolone suspension was drawn up into a 5 mL syringe with 4 mL of 1% lidocaine.  Injection was performed using substerile technique.  A 1.5-inch 22-gauge needle was used to enter the lateral aspect of the left knee.  Injection performed successfully without difficulty.  There were no complications. The patient tolerated the procedure well. There was negligible bleeding.     The patient's right knee was prepped with chlorhexidine in sterile fashion.   40 mg of triamcinolone suspension was drawn up into a 5 mL syringe with 4 mL of 1% lidocaine.  Injection was performed using substerile technique.  A 1.5-inch 22-gauge needle was used to enter the lateral aspect of the right knee.  Injection performed successfully without difficulty.  There were no complications. The patient tolerated the procedure well. There was negligible bleeding.     The patient was instructed to ice the knee upon leaving clinic and refrain from overuse over the next 3 days.   The patient was instructed to call or go to the emergency room with any unusual pain, swelling, redness, or if otherwise concerned.

## 2020-07-20 NOTE — LETTER
"    7/20/2020         RE: Bill Smith  [address] [facility] [address]        Dear Colleague,    Thank you for referring your patient, Bill Smith, to the [facility]. Please see a copy of my visit note below.    Mr. Smith is here for bilateral knee injections.  I last saw him just under a year ago, at that visit I injected his knees for bilateral knee osteoarthritis.  These helped him until just recently.  He lives in [place] for the colder months, he recently returned from his ranch home in [place] to his multi-level home in [place].  He noticed increased pain with climbing steps.    Scribed by Melanie Aguillon ATC for Dr. Patel on 7/20/20 at 4:15 PM, based on the providers statements to me.           [facility] FOLLOW-UP VISIT 7/20/2020    Bill Smith's chief complaint for this visit includes:  Chief Complaint   Patient presents with     RECHECK     bilateral knee injections, last injection were 8/2019      PCP: Nirmal Lubin    Referring Provider:  No referring provider defined for this encounter.    Ht 1.842 m (6' 0.5\")   Wt 111.1 kg (245 lb)   BMI 32.77 kg/m    Data Unavailable       Interval History:     Follow up reason: bilateral knee pain     Following Therapeutic Plan: Yes     Pain: Unchanged    Function: Unchanged    Medical History:    Any recent changes to your medical history? No    Any new medication prescribed since last visit? No    Review of Systems:    Do you have fever, chills, weight loss? No    Do you have any vision problems? No    Do you have any chest pain or edema? No    Do you have any shortness of breath or wheezing?  No    Do you have stomach problems? No    Do you have any urinary track issues? No    Do you have any numbness or focal weakness? No    Do you have diabetes? No    Do you have problems with bleeding or clotting? No    Do you have an rashes or other skin lesions? No    Large Joint Injection/Arthocentesis: " bilateral knee    Date/Time: 7/20/2020 4:12 PM  Performed by: Patrick Patel DO  Authorized by: Patrick Patel DO     Indications:  Pain  Needle Size:  22 G  Guidance: landmark guided    Approach:  Lateral  Location:  Knee  Laterality:  Bilateral      Medications (Right):  40 mg triamcinolone 40 MG/ML  Medications (Left):  40 mg triamcinolone 40 MG/ML  Outcome:  Tolerated well, no immediate complications  Procedure discussed: discussed risks, benefits, and alternatives    Consent Given by:  Patient  Timeout: timeout called immediately prior to procedure    Prep: patient was prepped and draped in usual sterile fashion       PROCEDURE    Knee Injection - Intraarticular    The patient was informed of the risks and the benefits of the procedure and a written consent was signed.    The patient s left knee was prepped with chlorhexidine in sterile fashion.   40 mg of triamcinolone suspension was drawn up into a 5 mL syringe with 4 mL of 1% lidocaine.  Injection was performed using substerile technique.  A 1.5-inch 22-gauge needle was used to enter the lateral aspect of the left knee.  Injection performed successfully without difficulty.  There were no complications. The patient tolerated the procedure well. There was negligible bleeding.     The patient's right knee was prepped with chlorhexidine in sterile fashion.   40 mg of triamcinolone suspension was drawn up into a 5 mL syringe with 4 mL of 1% lidocaine.  Injection was performed using substerile technique.  A 1.5-inch 22-gauge needle was used to enter the lateral aspect of the right knee.  Injection performed successfully without difficulty.  There were no complications. The patient tolerated the procedure well. There was negligible bleeding.     The patient was instructed to ice the knee upon leaving clinic and refrain from overuse over the next 3 days.   The patient was instructed to call or go to the emergency room with any unusual pain, swelling, redness,  or if otherwise concerned.                  Again, thank you for allowing me to participate in the care of your patient.        Sincerely,        Patrick Patel, DO

## 2020-08-11 ENCOUNTER — OFFICE VISIT (OUTPATIENT)
Dept: FAMILY MEDICINE | Facility: CLINIC | Age: 70
End: 2020-08-11
Payer: MEDICARE

## 2020-08-11 VITALS
HEIGHT: 73 IN | BODY MASS INDEX: 33.13 KG/M2 | OXYGEN SATURATION: 95 % | SYSTOLIC BLOOD PRESSURE: 134 MMHG | HEART RATE: 67 BPM | WEIGHT: 250 LBS | RESPIRATION RATE: 16 BRPM | DIASTOLIC BLOOD PRESSURE: 80 MMHG

## 2020-08-11 DIAGNOSIS — M25.562 PAIN IN BOTH KNEES, UNSPECIFIED CHRONICITY: ICD-10-CM

## 2020-08-11 DIAGNOSIS — Z12.5 SCREENING FOR PROSTATE CANCER: ICD-10-CM

## 2020-08-11 DIAGNOSIS — M25.561 PAIN IN BOTH KNEES, UNSPECIFIED CHRONICITY: ICD-10-CM

## 2020-08-11 DIAGNOSIS — Z00.00 ENCOUNTER FOR MEDICARE ANNUAL WELLNESS EXAM: Primary | ICD-10-CM

## 2020-08-11 DIAGNOSIS — I10 ESSENTIAL HYPERTENSION WITH GOAL BLOOD PRESSURE LESS THAN 140/90: ICD-10-CM

## 2020-08-11 DIAGNOSIS — Z23 ENCOUNTER FOR IMMUNIZATION: ICD-10-CM

## 2020-08-11 DIAGNOSIS — Z13.6 CARDIOVASCULAR SCREENING; LDL GOAL LESS THAN 130: ICD-10-CM

## 2020-08-11 DIAGNOSIS — Z11.59 NEED FOR HEPATITIS C SCREENING TEST: ICD-10-CM

## 2020-08-11 DIAGNOSIS — M1A.00X0 IDIOPATHIC CHRONIC GOUT WITHOUT TOPHUS, UNSPECIFIED SITE: ICD-10-CM

## 2020-08-11 LAB
ANION GAP SERPL CALCULATED.3IONS-SCNC: 6 MMOL/L (ref 3–14)
BUN SERPL-MCNC: 15 MG/DL (ref 7–30)
CALCIUM SERPL-MCNC: 9 MG/DL (ref 8.5–10.1)
CHLORIDE SERPL-SCNC: 109 MMOL/L (ref 94–109)
CHOLEST SERPL-MCNC: 118 MG/DL
CO2 SERPL-SCNC: 27 MMOL/L (ref 20–32)
CREAT SERPL-MCNC: 0.89 MG/DL (ref 0.66–1.25)
GFR SERPL CREATININE-BSD FRML MDRD: 85 ML/MIN/{1.73_M2}
GLUCOSE SERPL-MCNC: 149 MG/DL (ref 70–99)
HDLC SERPL-MCNC: 45 MG/DL
LDLC SERPL CALC-MCNC: 52 MG/DL
NONHDLC SERPL-MCNC: 73 MG/DL
POTASSIUM SERPL-SCNC: 3.4 MMOL/L (ref 3.4–5.3)
PSA SERPL-ACNC: 1.27 UG/L (ref 0–4)
SODIUM SERPL-SCNC: 142 MMOL/L (ref 133–144)
TRIGL SERPL-MCNC: 107 MG/DL

## 2020-08-11 PROCEDURE — 80048 BASIC METABOLIC PNL TOTAL CA: CPT | Performed by: FAMILY MEDICINE

## 2020-08-11 PROCEDURE — G0103 PSA SCREENING: HCPCS | Performed by: FAMILY MEDICINE

## 2020-08-11 PROCEDURE — 80061 LIPID PANEL: CPT | Performed by: FAMILY MEDICINE

## 2020-08-11 PROCEDURE — G0472 HEP C SCREEN HIGH RISK/OTHER: HCPCS | Performed by: FAMILY MEDICINE

## 2020-08-11 PROCEDURE — 90670 PCV13 VACCINE IM: CPT | Performed by: FAMILY MEDICINE

## 2020-08-11 PROCEDURE — G0009 ADMIN PNEUMOCOCCAL VACCINE: HCPCS | Mod: 59 | Performed by: FAMILY MEDICINE

## 2020-08-11 PROCEDURE — 36415 COLL VENOUS BLD VENIPUNCTURE: CPT | Performed by: FAMILY MEDICINE

## 2020-08-11 PROCEDURE — 90471 IMMUNIZATION ADMIN: CPT | Performed by: FAMILY MEDICINE

## 2020-08-11 PROCEDURE — G0439 PPPS, SUBSEQ VISIT: HCPCS | Performed by: FAMILY MEDICINE

## 2020-08-11 PROCEDURE — 82043 UR ALBUMIN QUANTITATIVE: CPT | Performed by: FAMILY MEDICINE

## 2020-08-11 PROCEDURE — 90750 HZV VACC RECOMBINANT IM: CPT | Performed by: FAMILY MEDICINE

## 2020-08-11 RX ORDER — CHLORTHALIDONE 25 MG/1
25 TABLET ORAL EVERY MORNING
Qty: 90 TABLET | Refills: 3 | Status: SHIPPED | OUTPATIENT
Start: 2020-08-11 | End: 2021-08-24

## 2020-08-11 RX ORDER — INDOMETHACIN 50 MG/1
50 CAPSULE ORAL 3 TIMES DAILY PRN
Qty: 60 CAPSULE | Refills: 3 | Status: SHIPPED | OUTPATIENT
Start: 2020-08-11 | End: 2023-09-07

## 2020-08-11 RX ORDER — ALLOPURINOL 100 MG/1
TABLET ORAL
Qty: 180 TABLET | Refills: 3 | Status: SHIPPED | OUTPATIENT
Start: 2020-08-11 | End: 2021-09-14

## 2020-08-11 RX ORDER — HYDRALAZINE HYDROCHLORIDE 50 MG/1
50 TABLET, FILM COATED ORAL 3 TIMES DAILY
Qty: 270 TABLET | Refills: 3 | Status: SHIPPED | OUTPATIENT
Start: 2020-08-11 | End: 2021-08-24

## 2020-08-11 RX ORDER — LOSARTAN POTASSIUM 100 MG/1
100 TABLET ORAL DAILY
Qty: 90 TABLET | Refills: 3 | Status: SHIPPED | OUTPATIENT
Start: 2020-08-11 | End: 2021-11-04

## 2020-08-11 RX ORDER — CARVEDILOL 25 MG/1
TABLET ORAL
Qty: 180 TABLET | Refills: 3 | Status: SHIPPED | OUTPATIENT
Start: 2020-08-11 | End: 2021-08-24

## 2020-08-11 ASSESSMENT — PAIN SCALES - GENERAL: PAINLEVEL: NO PAIN (0)

## 2020-08-11 ASSESSMENT — MIFFLIN-ST. JEOR: SCORE: 1934.93

## 2020-08-11 NOTE — PROGRESS NOTES
"  SUBJECTIVE:   Bill Smith is a 69 year old male who presents for Preventive Visit.    Are you in the first 12 months of your Medicare Part B coverage?  No    Physical Health:    In general, how would you rate your overall physical health? good    Outside of work, how many days during the week do you exercise? none    Outside of work, approximately how many minutes a day do you exercise?not applicable    If you drink alcohol do you typically have >3 drinks per day or >7 drinks per week? No    Do you usually eat at least 4 servings of fruit and vegetables a day, include whole grains & fiber and avoid regularly eating high fat or \"junk\" foods? NO    Do you have any problems taking medications regularly?  No    Do you have any side effects from medications? none    Needs assistance for the following daily activities: no assistance needed    Which of the following safety concerns are present in your home?  none identified     Hearing impairment: No    In the past 6 months, have you been bothered by leaking of urine? no    Mental Health:    In general, how would you rate your overall mental or emotional health? good  PHQ-2 Score:      Do you feel safe in your environment? Yes    Have you ever done Advance Care Planning? (For example, a Health Directive, POLST, or a discussion with a medical provider or your loved ones about your wishes): Yes, advance care planning is on file.    Additional concerns to address?  No    Fall risk:  Fallen 2 or more times in the past year?: No  Any fall with injury in the past year?: No    Cognitive Screenin) Repeat 3 items (Leader, Season, Table)    2) Clock draw: NORMAL  3) 3 item recall: Recalls 3 objects  Results: 3 items recalled: COGNITIVE IMPAIRMENT LESS LIKELY    Mini-CogTM Copyright MARÍA Raza. Licensed by the author for use in [facility]; reprinted with permission (mely@.[facility]). All rights reserved.      Do you have sleep apnea, excessive snoring or daytime " drowsiness?: yes          Reviewed and updated as needed this visit by clinical staff         Reviewed and updated as needed this visit by Provider        Social History     Tobacco Use     Smoking status: Never Smoker     Smokeless tobacco: Never Used   Substance Use Topics     Alcohol use: Yes                           Current providers sharing in care for this patient include:   Patient Care Team:  Nirmal Lubin MD as PCP - General (Family Practice)  Nirmal Lubin MD as Assigned PCP    The following health maintenance items are reviewed in Epic and correct as of today:  Health Maintenance   Topic Date Due     HEPATITIS C SCREENING  1950     PNEUMOCOCCAL IMMUNIZATION 65+ LOW/MEDIUM RISK (1 of 2 - PCV13) 10/22/2013     ZOSTER IMMUNIZATION (2 of 3) 07/17/2014     ADVANCE CARE PLANNING  03/29/2017     BMP  12/26/2019     MICROALBUMIN  05/29/2020     MEDICARE ANNUAL WELLNESS VISIT  07/09/2020     FALL RISK ASSESSMENT  07/09/2020     INFLUENZA VACCINE (1) 09/01/2020     COLORECTAL CANCER SCREENING  12/29/2021     DTAP/TDAP/TD IMMUNIZATION (3 - Td) 10/31/2022     LIPID  07/22/2024     PHQ-2  Completed     AORTIC ANEURYSM SCREENING (SYSTEM ASSIGNED)  Completed     IPV IMMUNIZATION  Aged Out     MENINGITIS IMMUNIZATION  Aged Out     HEPATITIS B IMMUNIZATION  Aged Out     Lab work is in process  BP Readings from Last 3 Encounters:   08/11/20 134/80   07/31/19 100/60   07/09/19 143/83    Wt Readings from Last 3 Encounters:   08/11/20 113.4 kg (250 lb)   07/20/20 111.1 kg (245 lb)   08/29/19 111.1 kg (245 lb)                  Patient Active Problem List   Diagnosis     History of hernia repair     Parathyroid adenoma     Gout     CARDIOVASCULAR SCREENING; LDL GOAL LESS THAN 130     Insomnia     Anxiety     Advanced directives, counseling/discussion     Essential hypertension with goal blood pressure less than 140/90     Idiopathic chronic gout without tophus, unspecified site     Prediabetes     Past Surgical History:    Procedure Laterality Date     C APPENDECTOMY       HEAD & NECK SURGERY       HERNIA REPAIR       LAMINECT/DISCECTOMY, CERVICAL  1999       Social History     Tobacco Use     Smoking status: Never Smoker     Smokeless tobacco: Never Used   Substance Use Topics     Alcohol use: Yes     Family History   Problem Relation Age of Onset     Prostate Cancer Father      Alzheimer Disease Paternal Grandmother      Hypertension Maternal Uncle         AGE 60'S         Current Outpatient Medications   Medication Sig Dispense Refill     acetaminophen (TYLENOL) 325 MG tablet Take 650 mg by mouth       allopurinol (ZYLOPRIM) 100 MG tablet TAKE 1 TABLET BY MOUTH TWICE DAILY FOR  GOUT 180 tablet 3     ascorbic acid (VITAMIN C) 1000 MG TABS Take 1 tablet by mouth daily.       blood glucose (NO BRAND SPECIFIED) test strip Supplies: test strips, dispense  100. Lab Results       Component                Value               Date                       A1C                      6.0                 02/06/2017       carvedilol (COREG) 25 MG tablet TAKE 1 TABLET BY MOUTH TWICE DAILY WITH MEALS FOR BLOOD PRESSURE 180 tablet 3     chlorthalidone (HYGROTON) 25 MG tablet Take 1 tablet (25 mg) by mouth every morning For blood pressure. 90 tablet 3     fluticasone (FLONASE) 50 MCG/ACT nasal spray Spray 2 sprays into both nostrils daily 48 g 3     hydrALAZINE (APRESOLINE) 50 MG tablet Take 1 tablet (50 mg) by mouth 3 times daily For blood pressure. 270 tablet 3     indomethacin (INDOCIN) 50 MG capsule Take 1 capsule (50 mg) by mouth 3 times daily as needed for moderate pain 60 capsule 3     losartan (COZAAR) 100 MG tablet Take 1 tablet (100 mg) by mouth daily For blood pressure. 90 tablet 3     MULTIPLE VITAMIN PO Take 1 tablet by mouth daily.       traZODone (DESYREL) 50 MG tablet Take 50 mg by mouth       Allergies   Allergen Reactions     Bermuda Grass Other (See Comments)     Coughing sneezing     Lisinopril Cough     Molds & Smuts Other (See  "Comments)     Coughing sneezing  Coughing sneezing       Shellfish Allergy Other (See Comments)     Causes gout         ROS:  Constitutional, HEENT, cardiovascular, pulmonary, GI, , musculoskeletal, neuro, skin, endocrine and psych systems are negative, except as otherwise noted.    OBJECTIVE:   /80 (BP Location: Left arm, Patient Position: Chair, Cuff Size: Adult Regular)   Pulse 67   Resp 16   Ht 1.842 m (6' 0.5\")   Wt 113.4 kg (250 lb)   SpO2 95%   BMI 33.44 kg/m   Estimated body mass index is 33.44 kg/m  as calculated from the following:    Height as of this encounter: 1.842 m (6' 0.5\").    Weight as of this encounter: 113.4 kg (250 lb).  EXAM:   GENERAL: healthy, alert and no distress  NECK: no adenopathy, no asymmetry, masses, or scars and thyroid normal to palpation  RESP: lungs clear to auscultation - no rales, rhonchi or wheezes  CV: regular rate and rhythm, normal S1 S2, no S3 or S4, no murmur, click or rub, no peripheral edema and peripheral pulses strong  ABDOMEN: soft, nontender, no hepatosplenomegaly, no masses and bowel sounds normal  MS: no gross musculoskeletal defects noted, no edema    Diagnostic Test Results:  Labs reviewed in Epic    ASSESSMENT / PLAN:   1. Encounter for Medicare annual wellness exam  As below.    2. Essential hypertension with goal blood pressure less than 140/90  Controlled. Recheck in 1 year.  - BASIC METABOLIC PANEL  - Albumin Random Urine Quantitative with Creat Ratio  - carvedilol (COREG) 25 MG tablet; TAKE 1 TABLET BY MOUTH TWICE DAILY WITH MEALS FOR BLOOD PRESSURE  Dispense: 180 tablet; Refill: 3  - hydrALAZINE (APRESOLINE) 50 MG tablet; Take 1 tablet (50 mg) by mouth 3 times daily For blood pressure.  Dispense: 270 tablet; Refill: 3  - losartan (COZAAR) 100 MG tablet; Take 1 tablet (100 mg) by mouth daily For blood pressure.  Dispense: 90 tablet; Refill: 3  - chlorthalidone (HYGROTON) 25 MG tablet; Take 1 tablet (25 mg) by mouth every morning For blood " "pressure.  Dispense: 90 tablet; Refill: 3    3. CARDIOVASCULAR SCREENING; LDL GOAL LESS THAN 130    - Lipid panel reflex to direct LDL Fasting    4. Pain in both knees, unspecified chronicity    - indomethacin (INDOCIN) 50 MG capsule; Take 1 capsule (50 mg) by mouth 3 times daily as needed for moderate pain  Dispense: 60 capsule; Refill: 3    5. Screening for prostate cancer    - PSA, screen    6. Need for hepatitis C screening test  Hep c screening test ordered.    7. Encounter for immunization    - VACCINE ADMINISTRATION, INITIAL  - Pneumococcal vaccine 13 valent PCV13 IM (Prevnar) [[id number]]  - ZOSTER VACCINE RECOMBINANT ADJUVANTED IM NJX  - EA ADD'L VACCINE    8. Idiopathic chronic gout without tophus, unspecified site    - allopurinol (ZYLOPRIM) 100 MG tablet; TAKE 1 TABLET BY MOUTH TWICE DAILY FOR  GOUT  Dispense: 180 tablet; Refill: 3    COUNSELING:  Reviewed preventive health counseling, as reflected in patient instructions       Regular exercise       Healthy diet/nutrition       Vision screening       Hearing screening    Estimated body mass index is 32.77 kg/m  as calculated from the following:    Height as of 7/20/20: 1.842 m (6' 0.5\").    Weight as of 7/20/20: 111.1 kg (245 lb).         reports that he has never smoked. He has never used smokeless tobacco.      Appropriate preventive services were discussed with this patient, including applicable screening as appropriate for cardiovascular disease, diabetes, osteopenia/osteoporosis, and glaucoma.  As appropriate for age/gender, discussed screening for colorectal cancer, prostate cancer, breast cancer, and cervical cancer. Checklist reviewing preventive services available has been given to the patient.    Reviewed patients plan of care and provided an AVS. The Basic Care Plan (routine screening as documented in Health Maintenance) for Bill meets the Care Plan requirement. This Care Plan has been established and reviewed with the Patient.    Counseling " Resources:  ATP IV Guidelines  Pooled Cohorts Equation Calculator  Breast Cancer Risk Calculator  FRAX Risk Assessment  ICSI Preventive Guidelines  Dietary Guidelines for Americans, 2010  USDA's MyPlate  ASA Prophylaxis  Lung CA Screening    Nirmal Lubin MD, MD  [facility]

## 2020-08-11 NOTE — NURSING NOTE
Prior to immunization administration, verified patients identity using patient s name and date of birth. Please see Immunization Activity for additional information.     Screening Questionnaire for Adult Immunization    Are you sick today?   No   Do you have allergies to medications, food, a vaccine component or latex?   YES   Have you ever had a serious reaction after receiving a vaccination?   No   Do you have a long-term health problem with heart, lung, kidney, or metabolic disease (e.g., diabetes), asthma, a blood disorder, no spleen, complement component deficiency, a cochlear implant, or a spinal fluid leak?  Are you on long-term aspirin therapy?   No   Do you have cancer, leukemia, HIV/AIDS, or any other immune system problem?   No   Do you have a parent, brother, or sister with an immune system problem?   No   In the past 3 months, have you taken medications that affect  your immune system, such as prednisone, other steroids, or anticancer drugs; drugs for the treatment of rheumatoid arthritis, Crohn s disease, or psoriasis; or have you had radiation treatments?   No   Have you had a seizure, or a brain or other nervous system problem?   No   During the past year, have you received a transfusion of blood or blood    products, or been given immune (gamma) globulin or antiviral drug?   No   For women: Are you pregnant or is there a chance you could become       pregnant during the next month?   No   Have you received any vaccinations in the past 4 weeks?   No     Immunization questionnaire was positive for at least one answer.  Notified PROVIDER.         Patient instructed to remain in clinic for 15 minutes afterwards, and to report any adverse reaction to me immediately.       Screening performed by Jasvir Beltran MA on 8/11/2020 at 5:41 PM.

## 2020-08-11 NOTE — LETTER
August 12, 2020      Bill Smith  [address] [place] [address]  [place] [address]      Dear ,    Your kidney, electrolyte, prostate and cholesterol tests were normal. Your hepatitis c screening test was negative. Your blood sugar was elevated in the diabetic range. We will need to monitor this. Please try to work on weight loss and regular exercise to help control the sugars. Please follow up in 6 months for recheck.     Sincerely,     Nirmal Lubin MD     Results for orders placed or performed in visit on 08/11/20   Hepatitis C Screen Reflex to HCV RNA Quant and Genotype     Status: None   Result Value Ref Range    Hepatitis C Antibody Nonreactive NR^Nonreactive   BASIC METABOLIC PANEL     Status: Abnormal   Result Value Ref Range    Sodium 142 133 - 144 mmol/L    Potassium 3.4 3.4 - 5.3 mmol/L    Chloride 109 94 - 109 mmol/L    Carbon Dioxide 27 20 - 32 mmol/L    Anion Gap 6 3 - 14 mmol/L    Glucose 149 (H) 70 - 99 mg/dL    Urea Nitrogen 15 7 - 30 mg/dL    Creatinine 0.89 0.66 - 1.25 mg/dL    GFR Estimate 85 >60 mL/min/[1.73_m2]    GFR Estimate If Black >90 >60 mL/min/[1.73_m2]    Calcium 9.0 8.5 - 10.1 mg/dL   Lipid panel reflex to direct LDL Fasting     Status: None   Result Value Ref Range    Cholesterol 118 <200 mg/dL    Triglycerides 107 <150 mg/dL    HDL Cholesterol 45 >39 mg/dL    LDL Cholesterol Calculated 52 <100 mg/dL    Non HDL Cholesterol 73 <130 mg/dL   PSA, screen     Status: None   Result Value Ref Range    PSA 1.27 0 - 4 ug/L

## 2020-08-11 NOTE — PATIENT INSTRUCTIONS
Patient Education   Personalized Prevention Plan  You are due for the preventive services outlined below.  Your care team is available to assist you in scheduling these services.  If you have already completed any of these items, please share that information with your care team to update in your medical record.  Health Maintenance Due   Topic Date Due     Hepatitis C Screening  1950     Pneumococcal Vaccine (1 of 2 - PCV13) 10/22/2013     Zoster (Shingles) Vaccine (2 of 3) 07/17/2014     Discuss Advance Care Planning  03/29/2017     Basic Metabolic Panel  12/26/2019     Kidney Microalbumin Urine Test  05/29/2020     Annual Wellness Visit  07/09/2020     FALL RISK ASSESSMENT  07/09/2020         At [facility], we strive to deliver an exceptional experience to you, every time we see you.  If you receive a survey in the mail, please send us back your thoughts. We really do value your feedback.    Based on your medical history, these are the current health maintenance/preventive care services that you are due for (some may have been done at this visit.)  Health Maintenance Due   Topic Date Due     HEPATITIS C SCREENING  1950     PNEUMOCOCCAL IMMUNIZATION 65+ LOW/MEDIUM RISK (1 of 2 - PCV13) 10/22/2013     ZOSTER IMMUNIZATION (2 of 3) 07/17/2014     ADVANCE CARE PLANNING  03/29/2017     BMP  12/26/2019     MICROALBUMIN  05/29/2020     MEDICARE ANNUAL WELLNESS VISIT  07/09/2020     FALL RISK ASSESSMENT  07/09/2020         Suggested websites for health information:  Www.Use It Better.Healthonomy : Up to date and easily searchable information on multiple topics.  Www.medlineplus.gov : medication info, interactive tutorials, watch real surgeries online  Www.familydoctor.org : good info from the Academy of Family Physicians  Www.cdc.gov : public health info, travel advisories, epidemics (H1N1)  Www.aap.org : children's health info, normal development, vaccinations  Www.health.[facility].[place].us : [place] dept of  health, public health issues in [place], N1N1    Your care team:                            Family Medicine Internal Medicine   MD Jonathan Albarran MD Shantel Branch-Fleming, MD Katya Georgiev PA-C Nam Ho, MD Pediatrics   AUGUSTINE Donaldson, JESSE Hardy APRMD Sabrina Cihlds CNP, MD Deborah Mielke, MD Kim Thein, APRN CNP      Clinic hours: Monday - Thursday 7 am-7 pm; Fridays 7 am-5 pm.   Urgent care: Monday - Friday 11 am-9 pm; Saturday and Sunday 9 am-5 pm.  Pharmacy : Monday -Thursday 8 am-8 pm; Friday 8 am-6 pm; Saturday and Sunday 9 am-5 pm.     Clinic: [phone]   Pharmacy: [phone]

## 2020-08-12 LAB
CREAT UR-MCNC: 519 MG/DL
HCV AB SERPL QL IA: NONREACTIVE
MICROALBUMIN UR-MCNC: 55 MG/L
MICROALBUMIN/CREAT UR: 10.52 MG/G CR (ref 0–17)

## 2020-10-29 DIAGNOSIS — J30.2 SEASONAL ALLERGIC RHINITIS, UNSPECIFIED TRIGGER: ICD-10-CM

## 2020-10-30 RX ORDER — FLUTICASONE PROPIONATE 50 MCG
SPRAY, SUSPENSION (ML) NASAL
Qty: 48 G | Refills: 1 | Status: ON HOLD | OUTPATIENT
Start: 2020-10-30 | End: 2023-08-11

## 2020-10-30 NOTE — TELEPHONE ENCOUNTER
Prescription approved per [facility] Refill Protocol.      Arcadio Waters RN, BSN, PHN

## 2020-12-22 ENCOUNTER — OFFICE VISIT (OUTPATIENT)
Dept: ORTHOPEDICS | Facility: CLINIC | Age: 70
End: 2020-12-22
Payer: MEDICARE

## 2020-12-22 VITALS — HEIGHT: 73 IN | BODY MASS INDEX: 33.13 KG/M2 | WEIGHT: 250 LBS

## 2020-12-22 DIAGNOSIS — M17.11 PRIMARY OSTEOARTHRITIS OF RIGHT KNEE: ICD-10-CM

## 2020-12-22 DIAGNOSIS — M17.12 PRIMARY OSTEOARTHRITIS OF LEFT KNEE: Primary | ICD-10-CM

## 2020-12-22 PROCEDURE — 99207 PR DROP WITH A PROCEDURE: CPT | Performed by: FAMILY MEDICINE

## 2020-12-22 PROCEDURE — 20610 DRAIN/INJ JOINT/BURSA W/O US: CPT | Mod: 50 | Performed by: FAMILY MEDICINE

## 2020-12-22 RX ADMIN — TRIAMCINOLONE ACETONIDE 40 MG: 40 INJECTION, SUSPENSION INTRA-ARTICULAR; INTRAMUSCULAR at 11:55

## 2020-12-22 ASSESSMENT — MIFFLIN-ST. JEOR: SCORE: 1929.93

## 2020-12-22 NOTE — LETTER
"    12/22/2020         RE: Bill Smith  [address]  [place] [address]        Dear Colleague,    Thank you for referring your patient, Bill Smith, to the [facility] [place]. Please see a copy of my visit note below.    Mr. Smith has bilateral knee osteoarthritis.  He is here for repeat bilateral knee injections.  His last injections were in July, these helped him for a few months.        [facility] FOLLOW-UP VISIT 12/22/2020    Bill Smith's chief complaint for this visit includes:  Chief Complaint   Patient presents with     RECHECK     bilateral knee pain, right knee increased pain with lateral side burning      PCP: Nirmal Lubin    Referring Provider:  No referring provider defined for this encounter.    Ht 1.842 m (6' 0.5\")   Wt 113.4 kg (250 lb)   BMI 33.44 kg/m    Data Unavailable       Interval History:     Follow up reason: bilateral knee injection     Following Therapeutic Plan: Yes     Pain: Worsening    Function: Worsening    Medical History:    Any recent changes to your medical history? No    Any new medication prescribed since last visit? No    Review of Systems:    Do you have fever, chills, weight loss? No    Do you have any vision problems? No    Do you have any chest pain or edema? No    Do you have any shortness of breath or wheezing?  No    Do you have stomach problems? No    Do you have any urinary track issues? No    Do you have any numbness or focal weakness? No    Do you have diabetes? No    Do you have problems with bleeding or clotting? No    Do you have an rashes or other skin lesions? No    Large Joint Injection/Arthocentesis: bilateral knee    Date/Time: 12/22/2020 11:55 AM  Performed by: Patrick Patel DO  Authorized by: Patrick Patel DO     Indications:  Pain  Needle Size:  22 G  Approach:  Lateral  Location:  Knee  Laterality:  Bilateral      Medications (Right):  40 mg triamcinolone 40 " MG/ML  Medications (Left):  40 mg triamcinolone 40 MG/ML  Outcome:  Tolerated well, no immediate complications  Procedure discussed: discussed risks, benefits, and alternatives    Consent Given by:  Patient  Timeout: timeout called immediately prior to procedure    Prep: patient was prepped and draped in usual sterile fashion       PROCEDURE    Knee Injection - Intraarticular  The patient was informed of the risks and the benefits of the procedure and a written consent was signed.    The patient's right knee was prepped with chlorhexidine in sterile fashion.   40 mg of triamcinolone suspension was drawn up into a 5 mL syringe with 4 mL of 1% lidocaine.  Injection was performed using substerile technique.  A 1.5-inch 22-gauge needle was used to enter the lateral aspect of the right knee.  Injection performed successfully without difficulty.  There were no complications. The patient tolerated the procedure well. There was negligible bleeding.    The patient s left knee was prepped with chlorhexidine in sterile fashion.  40 mg of triamcinolone suspension was drawn up into a 5 mL syringe with 4 mL of 1% lidocaine.  Injection was performed using substerile technique.  A 1.5-inch 22-gauge needle was used to enter the lateral aspect of the left knee.  Injection performed successfully without difficulty.  There were no complications. The patient tolerated the procedure well. There was negligible bleeding.                     Again, thank you for allowing me to participate in the care of your patient.        Sincerely,        Patrick Patel DO

## 2020-12-22 NOTE — PROGRESS NOTES
"Mr. Smith has bilateral knee osteoarthritis.  He is here for repeat bilateral knee injections.  His last injections were in July, these helped him for a few months.        [facility] FOLLOW-UP VISIT 12/22/2020    Bill Smith's chief complaint for this visit includes:  Chief Complaint   Patient presents with     RECHECK     bilateral knee pain, right knee increased pain with lateral side burning      PCP: Nirmal Lubin    Referring Provider:  No referring provider defined for this encounter.    Ht 1.842 m (6' 0.5\")   Wt 113.4 kg (250 lb)   BMI 33.44 kg/m    Data Unavailable       Interval History:     Follow up reason: bilateral knee injection     Following Therapeutic Plan: Yes     Pain: Worsening    Function: Worsening    Medical History:    Any recent changes to your medical history? No    Any new medication prescribed since last visit? No    Review of Systems:    Do you have fever, chills, weight loss? No    Do you have any vision problems? No    Do you have any chest pain or edema? No    Do you have any shortness of breath or wheezing?  No    Do you have stomach problems? No    Do you have any urinary track issues? No    Do you have any numbness or focal weakness? No    Do you have diabetes? No    Do you have problems with bleeding or clotting? No    Do you have an rashes or other skin lesions? No    Large Joint Injection/Arthocentesis: bilateral knee    Date/Time: 12/22/2020 11:55 AM  Performed by: Patrick Patel DO  Authorized by: Patrick Patel DO     Indications:  Pain  Needle Size:  22 G  Approach:  Lateral  Location:  Knee  Laterality:  Bilateral      Medications (Right):  40 mg triamcinolone 40 MG/ML  Medications (Left):  40 mg triamcinolone 40 MG/ML  Outcome:  Tolerated well, no immediate complications  Procedure discussed: discussed risks, benefits, and alternatives    Consent Given by:  Patient  Timeout: timeout called immediately prior to procedure    Prep: patient was " prepped and draped in usual sterile fashion       PROCEDURE    Knee Injection - Intraarticular  The patient was informed of the risks and the benefits of the procedure and a written consent was signed.    The patient's right knee was prepped with chlorhexidine in sterile fashion.   40 mg of triamcinolone suspension was drawn up into a 5 mL syringe with 4 mL of 1% lidocaine.  Injection was performed using substerile technique.  A 1.5-inch 22-gauge needle was used to enter the lateral aspect of the right knee.  Injection performed successfully without difficulty.  There were no complications. The patient tolerated the procedure well. There was negligible bleeding.    The patient s left knee was prepped with chlorhexidine in sterile fashion.  40 mg of triamcinolone suspension was drawn up into a 5 mL syringe with 4 mL of 1% lidocaine.  Injection was performed using substerile technique.  A 1.5-inch 22-gauge needle was used to enter the lateral aspect of the left knee.  Injection performed successfully without difficulty.  There were no complications. The patient tolerated the procedure well. There was negligible bleeding.

## 2020-12-24 RX ORDER — TRIAMCINOLONE ACETONIDE 40 MG/ML
40 INJECTION, SUSPENSION INTRA-ARTICULAR; INTRAMUSCULAR
Status: DISCONTINUED | OUTPATIENT
Start: 2020-12-22 | End: 2023-08-02

## 2021-01-07 ENCOUNTER — OFFICE VISIT (OUTPATIENT)
Dept: ORTHOPEDICS | Facility: CLINIC | Age: 71
End: 2021-01-07
Payer: MEDICARE

## 2021-01-07 VITALS
DIASTOLIC BLOOD PRESSURE: 80 MMHG | WEIGHT: 250 LBS | SYSTOLIC BLOOD PRESSURE: 130 MMHG | HEIGHT: 73 IN | BODY MASS INDEX: 33.13 KG/M2

## 2021-01-07 DIAGNOSIS — M17.11 PRIMARY OSTEOARTHRITIS OF RIGHT KNEE: Primary | ICD-10-CM

## 2021-01-07 PROCEDURE — 20610 DRAIN/INJ JOINT/BURSA W/O US: CPT | Mod: RT | Performed by: FAMILY MEDICINE

## 2021-01-07 PROCEDURE — 99207 PR DROP WITH A PROCEDURE: CPT | Performed by: FAMILY MEDICINE

## 2021-01-07 ASSESSMENT — MIFFLIN-ST. JEOR: SCORE: 1929.93

## 2021-01-07 NOTE — LETTER
"    1/7/2021         RE: Bill Smith  [address]  [place] [address]        Dear Colleague,    Thank you for referring your patient, Bill Smith, to the [facility] [place]. Please see a copy of my visit note below.    Alex is here for a VARELA injection in his right knee.  He has a history of right knee osteoarthritis.        [facility] FOLLOW-UP VISIT 1/7/2021    Bill Smith's chief complaint for this visit includes:  Chief Complaint   Patient presents with     RECHECK     right knee gel injection      PCP: Nirmal Lubin    Referring Provider:  No referring provider defined for this encounter.    /80   Ht 1.842 m (6' 0.5\")   Wt 113.4 kg (250 lb)   BMI 33.44 kg/m    Data Unavailable       Interval History:     Follow up reason: right knee gel injection     Following Therapeutic Plan: Yes     Pain: Unchanged    Function: Unchanged    Medical History:    Any recent changes to your medical history? No    Any new medication prescribed since last visit? No    Review of Systems:    Do you have fever, chills, weight loss? No    Do you have any vision problems? No    Do you have any chest pain or edema? No    Do you have any shortness of breath or wheezing?  No    Do you have stomach problems? No    Do you have any urinary track issues? No    Do you have any numbness or focal weakness? No    Do you have diabetes? No    Do you have problems with bleeding or clotting? No    Do you have an rashes or other skin lesions? No    Large Joint Injection/Arthocentesis: R knee joint    Date/Time: 1/7/2021 2:31 PM  Performed by: Patrick Patel DO  Authorized by: Patrick Patel DO     Indications:  Pain  Needle Size:  22 G  Location:  Knee      Medications:  30 mg cross-Linked Hyaluronate 30 MG/3ML  Outcome:  Tolerated well, no immediate complications  Procedure discussed: discussed risks, benefits, and alternatives    Consent Given by:  Patient  Timeout: " timeout called immediately prior to procedure    Prep: patient was prepped and draped in usual sterile fashion       Knee Injection with Hyaluronic Acid    The patient was informed of the risks and the benefits of the procedure and a written consent was signed.    The patient s right knee was prepped with chlorhexidine in sterile fashion.   GelOne solution removed from packaging and inspected for consistency with regards to volume and packaging standard.  Injection was performed using sterile technique.  A 1.5-inch 22-gauge needle was used to enter the lateral aspect of the knee.  Injection performed without difficulty.  There were no complications. The patient tolerated the procedure well. There was negligible bleeding.   The patient was instructed to ice the knee upon leaving clinic and refrain from overuse over the next 3 days.   The patient was instructed to call or go to the emergency room with any unusual pain, swelling, redness, or if otherwise concerned.  A follow up appointment will be scheduled to evaluate response to the injection, and to assess range of motion and pain.                  Again, thank you for allowing me to participate in the care of your patient.        Sincerely,        Patrick Patel DO

## 2021-01-07 NOTE — PROGRESS NOTES
"Alex is here for a VARELA injection in his right knee.  He has a history of right knee osteoarthritis.        [facility] FOLLOW-UP VISIT 1/7/2021    Bill Smith's chief complaint for this visit includes:  Chief Complaint   Patient presents with     RECHECK     right knee gel injection      PCP: Nirmal Lubin    Referring Provider:  No referring provider defined for this encounter.    /80   Ht 1.842 m (6' 0.5\")   Wt 113.4 kg (250 lb)   BMI 33.44 kg/m    Data Unavailable       Interval History:     Follow up reason: right knee gel injection     Following Therapeutic Plan: Yes     Pain: Unchanged    Function: Unchanged    Medical History:    Any recent changes to your medical history? No    Any new medication prescribed since last visit? No    Review of Systems:    Do you have fever, chills, weight loss? No    Do you have any vision problems? No    Do you have any chest pain or edema? No    Do you have any shortness of breath or wheezing?  No    Do you have stomach problems? No    Do you have any urinary track issues? No    Do you have any numbness or focal weakness? No    Do you have diabetes? No    Do you have problems with bleeding or clotting? No    Do you have an rashes or other skin lesions? No    Large Joint Injection/Arthocentesis: R knee joint    Date/Time: 1/7/2021 2:31 PM  Performed by: Patrick Patel DO  Authorized by: Patrick Patel DO     Indications:  Pain  Needle Size:  22 G  Location:  Knee      Medications:  30 mg cross-Linked Hyaluronate 30 MG/3ML  Outcome:  Tolerated well, no immediate complications  Procedure discussed: discussed risks, benefits, and alternatives    Consent Given by:  Patient  Timeout: timeout called immediately prior to procedure    Prep: patient was prepped and draped in usual sterile fashion       Knee Injection with Hyaluronic Acid    The patient was informed of the risks and the benefits of the procedure and a written consent was signed.    The " patient s right knee was prepped with chlorhexidine in sterile fashion.   GelOne solution removed from packaging and inspected for consistency with regards to volume and packaging standard.  Injection was performed using sterile technique.  A 1.5-inch 22-gauge needle was used to enter the lateral aspect of the knee.  Injection performed without difficulty.  There were no complications. The patient tolerated the procedure well. There was negligible bleeding.   The patient was instructed to ice the knee upon leaving clinic and refrain from overuse over the next 3 days.   The patient was instructed to call or go to the emergency room with any unusual pain, swelling, redness, or if otherwise concerned.  A follow up appointment will be scheduled to evaluate response to the injection, and to assess range of motion and pain.

## 2021-01-15 ENCOUNTER — ALLIED HEALTH/NURSE VISIT (OUTPATIENT)
Dept: NURSING | Facility: CLINIC | Age: 71
End: 2021-01-15
Payer: MEDICARE

## 2021-01-15 DIAGNOSIS — Z23 NEED FOR SHINGLES VACCINE: Primary | ICD-10-CM

## 2021-01-15 PROCEDURE — 99207 PR NO CHARGE NURSE ONLY: CPT

## 2021-01-15 PROCEDURE — 90471 IMMUNIZATION ADMIN: CPT

## 2021-01-15 PROCEDURE — 90750 HZV VACC RECOMBINANT IM: CPT

## 2021-02-10 ENCOUNTER — ANCILLARY PROCEDURE (OUTPATIENT)
Dept: MRI IMAGING | Facility: CLINIC | Age: 71
End: 2021-02-10
Attending: FAMILY MEDICINE
Payer: MEDICARE

## 2021-02-10 DIAGNOSIS — M17.11 PRIMARY OSTEOARTHRITIS OF RIGHT KNEE: ICD-10-CM

## 2021-02-10 PROCEDURE — G1004 CDSM NDSC: HCPCS | Performed by: RADIOLOGY

## 2021-02-10 PROCEDURE — 73721 MRI JNT OF LWR EXTRE W/O DYE: CPT | Mod: RT | Performed by: RADIOLOGY

## 2021-02-22 ENCOUNTER — MYC MEDICAL ADVICE (OUTPATIENT)
Dept: FAMILY MEDICINE | Facility: CLINIC | Age: 71
End: 2021-02-22

## 2021-02-22 NOTE — TELEPHONE ENCOUNTER
Called patient. He is not having symptoms now, but he does have some intermittent dizziness. We discussed he should be evaluated in office. Patient was given an appointment on 3/9/21. He asked if he could be seen sooner. Scheduled patient with Dr. Lubin on Friday, 2/26 at 11 am. If any new symptoms or questions, patient will call clinic to speak to a nurse.     Fernanda Murcia RN  [facility]

## 2021-02-23 ENCOUNTER — PREP FOR PROCEDURE (OUTPATIENT)
Dept: ORTHOPEDICS | Facility: CLINIC | Age: 71
End: 2021-02-23

## 2021-02-23 ENCOUNTER — OFFICE VISIT (OUTPATIENT)
Dept: ORTHOPEDICS | Facility: CLINIC | Age: 71
End: 2021-02-23
Payer: MEDICARE

## 2021-02-23 ENCOUNTER — ANCILLARY PROCEDURE (OUTPATIENT)
Dept: GENERAL RADIOLOGY | Facility: CLINIC | Age: 71
End: 2021-02-23
Attending: ORTHOPAEDIC SURGERY
Payer: MEDICARE

## 2021-02-23 VITALS — OXYGEN SATURATION: 94 % | DIASTOLIC BLOOD PRESSURE: 81 MMHG | HEART RATE: 78 BPM | SYSTOLIC BLOOD PRESSURE: 138 MMHG

## 2021-02-23 DIAGNOSIS — S83.241A OTHER TEAR OF MEDIAL MENISCUS OF RIGHT KNEE, UNSPECIFIED WHETHER OLD OR CURRENT TEAR, INITIAL ENCOUNTER: ICD-10-CM

## 2021-02-23 DIAGNOSIS — S83.241A OTHER TEAR OF MEDIAL MENISCUS OF RIGHT KNEE, UNSPECIFIED WHETHER OLD OR CURRENT TEAR, INITIAL ENCOUNTER: Primary | ICD-10-CM

## 2021-02-23 DIAGNOSIS — M17.11 PRIMARY OSTEOARTHRITIS OF RIGHT KNEE: ICD-10-CM

## 2021-02-23 DIAGNOSIS — M23.203 CHRONIC BUCKET HANDLE TEAR OF MEDIAL MENISCUS OF RIGHT KNEE: Primary | ICD-10-CM

## 2021-02-23 PROCEDURE — 73562 X-RAY EXAM OF KNEE 3: CPT | Mod: RT | Performed by: RADIOLOGY

## 2021-02-23 PROCEDURE — 99204 OFFICE O/P NEW MOD 45 MIN: CPT | Performed by: ORTHOPAEDIC SURGERY

## 2021-02-23 ASSESSMENT — PAIN SCALES - GENERAL: PAINLEVEL: MODERATE PAIN (5)

## 2021-02-23 NOTE — LETTER
"    2/23/2021         RE: Bill Smith  [address] [address]        Dear Colleague,    Thank you for referring your patient, Bill Smith, to the [facility]. Please see a copy of my visit note below.    SUBJECTIVE:   Bill Smith is a 70 year old male who is seen in consultation at the request of Dr. Patel for evaluation of right knee pain.  Chronic  Right knee pain worse in October, had a hyperflexion injury. Pain worse since   No known injury.    Present symptoms: stairs are the worst  Pain with with long walks  Swells  Anterior and some pain down lateral leg.  No catching, locking or giving-way    Walking can get a momentary \"stinger\"  Doesn't flex well--chronically.    Treatments tried to this point: Corticosteroid injections and viscous supplementation injections since the fall have not been effective.     Previous knee issues: history of bilateral knee osteoarthritis. Has had multiple previous corticosteroid injections bilaterally with 3-6 months relief.    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     Parathyroid adenoma 2007    parathyroidectomy 04/2007     Past Surgical History:   Past Surgical History:   Procedure Laterality Date     C APPENDECTOMY       HEAD & NECK SURGERY       HERNIA REPAIR       LAMINECT/DISCECTOMY, CERVICAL  1999     Family History:   Family History   Problem Relation Age of Onset     Prostate Cancer Father      Alzheimer Disease Paternal Grandmother      Hypertension Maternal Uncle         AGE 60'S     Social History:   Social History     Tobacco Use     Smoking status: Never Smoker     Smokeless tobacco: Never Used   Substance Use Topics     Alcohol use: Yes       Review of Systems:  Constitutional:  NEGATIVE for fever, chills, change in weight  Integumentary/Skin:  NEGATIVE for " worrisome rashes, moles or lesions  Eyes:  NEGATIVE for vision changes or irritation  ENT/Mouth:  NEGATIVE for ear, mouth and throat problems  Resp:  NEGATIVE for significant cough or SOB  Breast:  NEGATIVE for masses, tenderness or discharge  CV:  NEGATIVE for chest pain, palpitations or peripheral edema  GI:  NEGATIVE for nausea, abdominal pain, heartburn, or change in bowel habits  :  Negative   Musculoskeletal:  See HPI above  Neuro:  NEGATIVE for weakness, dizziness or paresthesias  Endocrine:  NEGATIVE for temperature intolerance, skin/hair changes  Heme/allergy/immune:  NEGATIVE for bleeding problems  Psychiatric:  NEGATIVE for changes in mood or affect      OBJECTIVE:  Physical Exam:  /81 (BP Location: Right arm, Patient Position: Sitting, Cuff Size: Adult Regular)   Pulse 78   SpO2 94%   General Appearance: healthy, alert and no distress   Skin: no suspicious lesions or rashes  Neuro: Normal strength and tone, mentation intact and speech normal  Vascular: good pulses, and cappillary refill   Lymph: no lymphadenopathy   Psych:  mentation appears normal and affect normal/bright  Resp: no increased work of breathing     Right Knee Exam:  Gait: walks with normal gait  Alignment: normal   Squat: 50 % painful.    Patellofemoral joint: significant crepitations in the patellofemoral joint.  Effusion: moderate  ROM: 0-140  Tender: lateral joint line  Masses: none  Ligaments:   Lachman's: stable   Anterior/Posterior drawer: stable,   Varus/Valgus stress: stable to varus and valgus stress  McMurrays: equivocal laterally    X-rays:  Obtained 8/29/19, reviewed in the office with the patient today:    Mild degenerative changes in bilateral knees.    MRI:    From 2/10/21 reviewed in the office with the patient today:   1. Multidirectional tear of the medial meniscus with 4 mm meniscal  flap at the posterior horn/root ligament junction.  2. 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  3. Posterolateral capsular tear/defect including expected course of  the arcuate ligament     ASSESSMENT:   Encounter Diagnoses   Name Primary?     Other tear of medial meniscus of right knee, unspecified whether old or current tear, initial encounter Yes     Primary osteoarthritis of right knee         PLAN:   Knee Arthroscopy OA: Discussed findings with patient.  We talked about treatment options.  The pain may be due to either arthritis or the meniscal tear; or a combination of both.  I feel the best treatment option due to the mechanical symptoms, and the fact that the radiographic osteoarthritis is not approaching bone-bone, is to do arthroscopy.  Injections later after the knee has been debrided if problems persist. Our goal is to get him back to the status he was pre-injury.  I have explained the nature of the procedure, the risks and recovery time with the patient.      MONICA Riley MD  Dept. Orthopedic Surgery  [facility]       Again, thank you for allowing me to participate in the care of your patient.        Sincerely,        Jens Riley MD

## 2021-02-24 ENCOUNTER — TELEPHONE (OUTPATIENT)
Dept: ORTHOPEDICS | Facility: CLINIC | Age: 71
End: 2021-02-24

## 2021-02-24 NOTE — TELEPHONE ENCOUNTER
Spoke to patient. Patient would like to schedule for late June. Advise patient to call back mid-May to schedule.

## 2021-02-26 ENCOUNTER — OFFICE VISIT (OUTPATIENT)
Dept: FAMILY MEDICINE | Facility: CLINIC | Age: 71
End: 2021-02-26
Payer: MEDICARE

## 2021-02-26 VITALS
BODY MASS INDEX: 33.53 KG/M2 | TEMPERATURE: 97.4 F | OXYGEN SATURATION: 96 % | DIASTOLIC BLOOD PRESSURE: 81 MMHG | RESPIRATION RATE: 16 BRPM | HEART RATE: 73 BPM | WEIGHT: 253 LBS | HEIGHT: 73 IN | SYSTOLIC BLOOD PRESSURE: 154 MMHG

## 2021-02-26 DIAGNOSIS — R73.09 ELEVATED GLUCOSE: ICD-10-CM

## 2021-02-26 DIAGNOSIS — R42 LIGHTHEADEDNESS: Primary | ICD-10-CM

## 2021-02-26 DIAGNOSIS — Z13.1 SCREENING FOR DIABETES MELLITUS: ICD-10-CM

## 2021-02-26 DIAGNOSIS — E11.9 TYPE 2 DIABETES MELLITUS WITHOUT COMPLICATION, WITHOUT LONG-TERM CURRENT USE OF INSULIN (H): ICD-10-CM

## 2021-02-26 LAB
ALBUMIN SERPL-MCNC: 3.7 G/DL (ref 3.4–5)
ALP SERPL-CCNC: 87 U/L (ref 40–150)
ALT SERPL W P-5'-P-CCNC: 27 U/L (ref 0–70)
ANION GAP SERPL CALCULATED.3IONS-SCNC: 7 MMOL/L (ref 3–14)
AST SERPL W P-5'-P-CCNC: 14 U/L (ref 0–45)
BILIRUB SERPL-MCNC: 0.6 MG/DL (ref 0.2–1.3)
BUN SERPL-MCNC: 12 MG/DL (ref 7–30)
CALCIUM SERPL-MCNC: 8.8 MG/DL (ref 8.5–10.1)
CHLORIDE SERPL-SCNC: 107 MMOL/L (ref 94–109)
CO2 SERPL-SCNC: 26 MMOL/L (ref 20–32)
CREAT SERPL-MCNC: 0.74 MG/DL (ref 0.66–1.25)
ERYTHROCYTE [DISTWIDTH] IN BLOOD BY AUTOMATED COUNT: 12.8 % (ref 10–15)
GFR SERPL CREATININE-BSD FRML MDRD: >90 ML/MIN/{1.73_M2}
GLUCOSE SERPL-MCNC: 140 MG/DL (ref 70–99)
HBA1C MFR BLD: 7.5 % (ref 0–5.6)
HCT VFR BLD AUTO: 41.7 % (ref 40–53)
HGB BLD-MCNC: 14.1 G/DL (ref 13.3–17.7)
MCH RBC QN AUTO: 29.6 PG (ref 26.5–33)
MCHC RBC AUTO-ENTMCNC: 33.8 G/DL (ref 31.5–36.5)
MCV RBC AUTO: 87 FL (ref 78–100)
PLATELET # BLD AUTO: 206 10E9/L (ref 150–450)
POTASSIUM SERPL-SCNC: 3.2 MMOL/L (ref 3.4–5.3)
PROT SERPL-MCNC: 7.1 G/DL (ref 6.8–8.8)
RBC # BLD AUTO: 4.77 10E12/L (ref 4.4–5.9)
SODIUM SERPL-SCNC: 140 MMOL/L (ref 133–144)
WBC # BLD AUTO: 10.5 10E9/L (ref 4–11)

## 2021-02-26 PROCEDURE — 85027 COMPLETE CBC AUTOMATED: CPT | Performed by: FAMILY MEDICINE

## 2021-02-26 PROCEDURE — 99214 OFFICE O/P EST MOD 30 MIN: CPT | Performed by: FAMILY MEDICINE

## 2021-02-26 PROCEDURE — 83036 HEMOGLOBIN GLYCOSYLATED A1C: CPT | Performed by: FAMILY MEDICINE

## 2021-02-26 PROCEDURE — 36415 COLL VENOUS BLD VENIPUNCTURE: CPT | Performed by: FAMILY MEDICINE

## 2021-02-26 PROCEDURE — 80053 COMPREHEN METABOLIC PANEL: CPT | Performed by: FAMILY MEDICINE

## 2021-02-26 ASSESSMENT — MIFFLIN-ST. JEOR: SCORE: 1943.54

## 2021-02-26 ASSESSMENT — PAIN SCALES - GENERAL: PAINLEVEL: NO PAIN (0)

## 2021-02-26 NOTE — PATIENT INSTRUCTIONS
At [facility], we strive to deliver an exceptional experience to you, every time we see you. If you receive a survey, please complete it as we do value your feedback.  If you have MyChart, you can expect to receive results automatically within 24 hours of their completion.  Your provider will send a note interpreting your results as well.   If you do not have MyChart, you should receive your results in about a week by mail.    Your care team:                            Family Medicine Internal Medicine   MD Jonathan Albarran MD Shantel Branch-Fleming, MD Srinivasa Vaka, MD Katya Belousova, PAZA Bar, APRN CNP    Nirmal Lubin, MD Pediatrics   Jake Storm, PAZA Esposito, CNP MD Bailey Luke APRN CNP   MD Sabrina Higgins MD Deborah Mielke, MD Elke Villar, APRN [facility]      Clinic hours: Monday - Thursday 7 am-6 pm; Fridays 7 am-5 pm.   Urgent care: Monday - Friday 11 am-9 pm; Saturday and Sunday 9 am-5 pm.    Clinic: [phone]       [facility]: Monday - Thursday 8 am - 7 pm; Friday 8 am - 6 pm  [facility]: [phone]     Use [email or web address] for 24/7 diagnosis and treatment of dozens of conditions.

## 2021-02-26 NOTE — PROGRESS NOTES
"Kylee Khan is a 70 year old who presents for the following health issues:    HPI       Dizziness  Onset/Duration: 2-3 months, on and off  Description:   Do you feel faint: YES  Does it feel like the surroundings (bed, room) are moving: YES  Unsteady/off balance: YES  Have you passed out or fallen: no  Intensity: moderate  Progression of Symptoms: same and intermittent  Accompanying Signs & Symptoms:  Heart palpitations or chest pain: no  Nausea, vomiting: no  Weakness or lack of coordination in arms or legs: no  Vision or speech changes: no  Numbness or tingling: no  Ringing in ears (Tinnitus): no  Hearing Loss: no  History:   Head trauma/concussion history: no  Previous similar symptoms: no  Recent bleeding history: no  Any new medications (BP?): no  Precipitating factors:   Worse with activity: YES  Worse with head movement: YES  Alleviating factors:   Does staying in a fixed position give relief: YES  Therapies tried and outcome: None      Review of Systems   Constitutional, HEENT, cardiovascular, pulmonary, GI, , musculoskeletal, neuro, skin, endocrine and psych systems are negative, except as otherwise noted.      Objective    BP (!) 154/81 (BP Location: Right arm, Patient Position: Sitting, Cuff Size: Adult Large)   Pulse 73   Temp 97.4  F (36.3  C) (Tympanic)   Resp 16   Ht 1.842 m (6' 0.5\")   Wt 114.8 kg (253 lb)   SpO2 96%   BMI 33.84 kg/m    Body mass index is 33.84 kg/m .  Physical Exam   GENERAL: healthy, alert and no distress  NECK: no adenopathy, no asymmetry, masses, or scars and thyroid normal to palpation  RESP: lungs clear to auscultation - no rales, rhonchi or wheezes  CV: regular rate and rhythm, normal S1 S2, no S3 or S4, no murmur, click or rub, no peripheral edema and peripheral pulses strong  ABDOMEN: soft, nontender, no hepatosplenomegaly, no masses and bowel sounds normal  MS: no gross musculoskeletal defects noted, no edema    A/P:  (R42) Lightheadedness  (primary " encounter diagnosis)  Comment:   Plan: Comprehensive metabolic panel (BMP + Alb, Alk         Phos, ALT, AST, Total. Bili, TP), CBC with         platelets        Intermittent elevation of glucose? Patient has been eating a lot sweets per patient's wife. Will r/o diabetes.    (Z13.1) Screening for diabetes mellitus  Comment:   Plan: Comprehensive metabolic panel (BMP + Alb, Alk         Phos, ALT, AST, Total. Bili, TP)            (R73.09) Elevated glucose  Comment:   Plan: Hemoglobin A1c            Nirmal Lubin MD

## 2021-06-05 ENCOUNTER — HEALTH MAINTENANCE LETTER (OUTPATIENT)
Age: 71
End: 2021-06-05

## 2021-06-07 ENCOUNTER — PREP FOR PROCEDURE (OUTPATIENT)
Dept: ORTHOPEDICS | Facility: CLINIC | Age: 71
End: 2021-06-07

## 2021-06-07 DIAGNOSIS — S83.231D COMPLEX TEAR OF MEDIAL MENISCUS OF RIGHT KNEE AS CURRENT INJURY, SUBSEQUENT ENCOUNTER: Primary | ICD-10-CM

## 2021-06-07 DIAGNOSIS — M94.261 CHONDROMALACIA OF RIGHT KNEE: ICD-10-CM

## 2021-06-08 ENCOUNTER — TELEPHONE (OUTPATIENT)
Dept: ORTHOPEDICS | Facility: CLINIC | Age: 71
End: 2021-06-08

## 2021-06-09 DIAGNOSIS — Z11.59 ENCOUNTER FOR SCREENING FOR OTHER VIRAL DISEASES: ICD-10-CM

## 2021-06-09 PROBLEM — S83.231D COMPLEX TEAR OF MEDIAL MENISCUS OF RIGHT KNEE AS CURRENT INJURY, SUBSEQUENT ENCOUNTER: Status: ACTIVE | Noted: 2021-06-09

## 2021-06-09 PROBLEM — M94.261 CHONDROMALACIA OF RIGHT KNEE: Status: ACTIVE | Noted: 2021-06-09

## 2021-06-09 NOTE — TELEPHONE ENCOUNTER
Type of surgery: arthroscopy,right knee,with medial meniscectomy,possible chondroplasty  CPT 75966/01610/52159   Other tear of medial meniscus of right knee, unspecified whether old or current tear, initial encounter S83.627V    Location of surgery: [facility]  Date and time of surgery: 6-30-21  TBD  Surgeon: Dr Riley  Pre-Op Appt Date: 6-17-21  Post-Op Appt Date: 7-13-21   Packet sent out: Yes  Pre-cert/Authorization completed:    No prior auth needed for Medicare  Date: 06/09/2021    Thank you,   Elda Mcconnell   Prior Authorization Department  [phone]

## 2021-06-17 ENCOUNTER — OFFICE VISIT (OUTPATIENT)
Dept: FAMILY MEDICINE | Facility: CLINIC | Age: 71
End: 2021-06-17
Payer: MEDICARE

## 2021-06-17 VITALS
DIASTOLIC BLOOD PRESSURE: 85 MMHG | SYSTOLIC BLOOD PRESSURE: 138 MMHG | BODY MASS INDEX: 32.6 KG/M2 | HEIGHT: 73 IN | TEMPERATURE: 98.3 F | HEART RATE: 73 BPM | WEIGHT: 246 LBS | OXYGEN SATURATION: 96 %

## 2021-06-17 DIAGNOSIS — Z01.818 PRE-OP EXAM: Primary | ICD-10-CM

## 2021-06-17 DIAGNOSIS — E11.9 TYPE 2 DIABETES MELLITUS WITHOUT COMPLICATION, WITHOUT LONG-TERM CURRENT USE OF INSULIN (H): ICD-10-CM

## 2021-06-17 DIAGNOSIS — I10 ESSENTIAL HYPERTENSION WITH GOAL BLOOD PRESSURE LESS THAN 140/90: ICD-10-CM

## 2021-06-17 LAB
ERYTHROCYTE [DISTWIDTH] IN BLOOD BY AUTOMATED COUNT: 12.7 % (ref 10–15)
HBA1C MFR BLD: 6.3 % (ref 0–5.6)
HCT VFR BLD AUTO: 42.5 % (ref 40–53)
HGB BLD-MCNC: 14.2 G/DL (ref 13.3–17.7)
MCH RBC QN AUTO: 28.9 PG (ref 26.5–33)
MCHC RBC AUTO-ENTMCNC: 33.4 G/DL (ref 31.5–36.5)
MCV RBC AUTO: 87 FL (ref 78–100)
PLATELET # BLD AUTO: 248 10E9/L (ref 150–450)
RBC # BLD AUTO: 4.91 10E12/L (ref 4.4–5.9)
WBC # BLD AUTO: 10.3 10E9/L (ref 4–11)

## 2021-06-17 PROCEDURE — 99214 OFFICE O/P EST MOD 30 MIN: CPT | Performed by: FAMILY MEDICINE

## 2021-06-17 PROCEDURE — 85027 COMPLETE CBC AUTOMATED: CPT | Performed by: FAMILY MEDICINE

## 2021-06-17 PROCEDURE — 93000 ELECTROCARDIOGRAM COMPLETE: CPT | Performed by: FAMILY MEDICINE

## 2021-06-17 PROCEDURE — 80048 BASIC METABOLIC PNL TOTAL CA: CPT | Performed by: FAMILY MEDICINE

## 2021-06-17 PROCEDURE — 36415 COLL VENOUS BLD VENIPUNCTURE: CPT | Performed by: FAMILY MEDICINE

## 2021-06-17 PROCEDURE — 83721 ASSAY OF BLOOD LIPOPROTEIN: CPT | Performed by: FAMILY MEDICINE

## 2021-06-17 PROCEDURE — 83036 HEMOGLOBIN GLYCOSYLATED A1C: CPT | Performed by: FAMILY MEDICINE

## 2021-06-17 PROCEDURE — 82043 UR ALBUMIN QUANTITATIVE: CPT | Performed by: FAMILY MEDICINE

## 2021-06-17 ASSESSMENT — PAIN SCALES - GENERAL: PAINLEVEL: SEVERE PAIN (7)

## 2021-06-17 ASSESSMENT — MIFFLIN-ST. JEOR: SCORE: 1911.79

## 2021-06-17 NOTE — PROGRESS NOTES
[address] [address]  Phone: [phone]  Primary Provider: Maria L Jalloh  Pre-op Performing Provider: MARIA L JALLOH      PREOPERATIVE EVALUATION:  Today's date: 6/17/2021    Bill Smith is a 70 year old male who presents for a preoperative evaluation.    Surgical Information:  Surgery/Procedure: ARTHROSCOPY, RIGHT KNEE, WITH MEDIAL MENISCECTOMY, POSSIBLE CHONDROPLASTY  Surgery Location: right knee  Surgeon: Jens Riley MD  Surgery Date: 6/30/22  Time of Surgery: 10:25  Where patient plans to recover: At home with family  Fax number for surgical facility: Note does not need to be faxed, will be available electronically in Epic.    Type of Anesthesia Anticipated: General    Assessment & Plan     The proposed surgical procedure is considered INTERMEDIATE risk.    Pre-op exam  Okay to proceed with surgery as scheduled.  - EKG 12-lead complete w/read - Clinics  - CBC with platelets    Type 2 diabetes mellitus without complication, without long-term current use of insulin (H)  Controlled. Will hold metformin on day of surgery. May restart after surgery.  - HEMOGLOBIN A1C  - Basic metabolic panel  (Ca, Cl, CO2, Creat, Gluc, K, Na, BUN)  - LDL cholesterol direct  - Albumin Random Urine Quantitative with Creat Ratio    Essential hypertension with goal blood pressure less than 140/90  Continue with current medications except for losartan. Hold losartan day of surgery. May restart after surgery.  - Basic metabolic panel  (Ca, Cl, CO2, Creat, Gluc, K, Na, BUN)  - Albumin Random Urine Quantitative with Creat Ratio      RECOMMENDATION:  APPROVAL GIVEN to proceed with proposed procedure, without further diagnostic evaluation.      Subjective     HPI related to upcoming procedure: history of right medial meniscus tear.      Preop Questions 6/17/2021   1. Have you ever had a heart attack or stroke? No   2. Have you ever had surgery on your heart or  blood vessels, such as a stent placement, a coronary artery bypass, or surgery on an artery in your head, neck, heart, or legs? No   3. Do you have chest pain with activity? No   4. Do you have a history of  heart failure? No   5. Do you currently have a cold, bronchitis or symptoms of other infection? No   6. Do you have a cough, shortness of breath, or wheezing? No   7. Do you or anyone in your family have previous history of blood clots? No   8. Do you or does anyone in your family have a serious bleeding problem such as prolonged bleeding following surgeries or cuts? No   9. Have you ever had problems with anemia or been told to take iron pills? No   10. Have you had any abnormal blood loss such as black, tarry or bloody stools? No   11. Have you ever had a blood transfusion? No   12. Are you willing to have a blood transfusion if it is medically needed before, during, or after your surgery? Yes   13. Have you or any of your relatives ever had problems with anesthesia? No   14. Do you have sleep apnea, excessive snoring or daytime drowsiness? No   15. Do you have any artifical heart valves or other implanted medical devices like a pacemaker, defibrillator, or continuous glucose monitor? No   16. Do you have artificial joints? No   17. Are you allergic to latex? No         Review of Systems  CONSTITUTIONAL: NEGATIVE for fever, chills, change in weight  INTEGUMENTARY/SKIN: NEGATIVE for worrisome rashes, moles or lesions  EYES: NEGATIVE for vision changes or irritation  ENT/MOUTH: NEGATIVE for ear, mouth and throat problems  RESP: NEGATIVE for significant cough or SOB  BREAST: NEGATIVE for masses, tenderness or discharge  CV: NEGATIVE for chest pain, palpitations or peripheral edema  GI: NEGATIVE for nausea, abdominal pain, heartburn, or change in bowel habits  : NEGATIVE for frequency, dysuria, or hematuria  MUSCULOSKELETAL: NEGATIVE for significant arthralgias or myalgia  NEURO: NEGATIVE for weakness, dizziness  or paresthesias  ENDOCRINE: NEGATIVE for temperature intolerance, skin/hair changes  HEME: NEGATIVE for bleeding problems  PSYCHIATRIC: NEGATIVE for changes in mood or affect    Patient Active Problem List    Diagnosis Date Noted     Complex tear of medial meniscus of right knee as current injury, subsequent encounter 06/09/2021     Priority: Medium     Added automatically from request for surgery [id number]       Chondromalacia of right knee 06/09/2021     Priority: Medium     Added automatically from request for surgery [id number]       Prediabetes 05/29/2019     Priority: Medium     Essential hypertension with goal blood pressure less than 140/90 12/05/2018     Priority: Medium     Idiopathic chronic gout without tophus, unspecified site 12/05/2018     Priority: Medium     Advanced directives, counseling/discussion 03/29/2012     Priority: Medium     Patient states has Advance Directive and will bring in a copy to clinic. 3/29/2012  SAL Crouch            Insomnia 08/26/2011     Priority: Medium     Anxiety 08/26/2011     Priority: Medium     CARDIOVASCULAR SCREENING; LDL GOAL LESS THAN 130 10/31/2010     Priority: Medium     History of hernia repair      Priority: Medium     Parathyroid adenoma      Priority: Medium     parathyroidectomy 04/2007       Gout      Priority: Medium      Past Medical History:   Diagnosis Date     Acute medial meniscus tear of right knee      Gout      History of hernia repair      HTN      Idiopathic chronic gout without tophus, unspecified site 12/5/2018     Parathyroid adenoma 2007    parathyroidectomy 04/2007     Past Surgical History:   Procedure Laterality Date     C APPENDECTOMY       HEAD & NECK SURGERY       HERNIA REPAIR       LAMINECT/DISCECTOMY, CERVICAL  1999     Current Outpatient Medications   Medication Sig Dispense Refill     acetaminophen (TYLENOL) 325 MG tablet Take 650 mg by mouth       allopurinol (ZYLOPRIM) 100 MG tablet TAKE 1 TABLET BY MOUTH TWICE DAILY FOR  GOUT  180 tablet 3     ascorbic acid (VITAMIN C) 1000 MG TABS Take 1 tablet by mouth daily.       blood glucose (NO BRAND SPECIFIED) test strip Supplies: test strips, dispense  100. Lab Results       Component                Value               Date                       A1C                      6.0                 02/06/2017       carvedilol (COREG) 25 MG tablet TAKE 1 TABLET BY MOUTH TWICE DAILY WITH MEALS FOR BLOOD PRESSURE 180 tablet 3     chlorthalidone (HYGROTON) 25 MG tablet Take 1 tablet (25 mg) by mouth every morning For blood pressure. 90 tablet 3     fluticasone (FLONASE) 50 MCG/ACT nasal spray Use 2 spray(s) in each nostril once daily 48 g 1     hydrALAZINE (APRESOLINE) 50 MG tablet Take 1 tablet (50 mg) by mouth 3 times daily For blood pressure. 270 tablet 3     indomethacin (INDOCIN) 50 MG capsule Take 1 capsule (50 mg) by mouth 3 times daily as needed for moderate pain 60 capsule 3     losartan (COZAAR) 100 MG tablet Take 1 tablet (100 mg) by mouth daily For blood pressure. 90 tablet 3     metFORMIN (GLUCOPHAGE) 500 MG tablet Take 1 tablet (500 mg) by mouth 2 times daily (with meals) For diabetes. 180 tablet 3     MULTIPLE VITAMIN PO Take 1 tablet by mouth daily.       traZODone (DESYREL) 50 MG tablet Take 50 mg by mouth         Allergies   Allergen Reactions     Bermuda Grass Other (See Comments)     Coughing sneezing     Lisinopril Cough     Molds & Smuts Other (See Comments)     Coughing sneezing  Coughing sneezing       Shellfish Allergy Other (See Comments)     Causes gout        Social History     Tobacco Use     Smoking status: Never Smoker     Smokeless tobacco: Never Used   Substance Use Topics     Alcohol use: Yes     Family History   Problem Relation Age of Onset     Prostate Cancer Father      Alzheimer Disease Paternal Grandmother      Hypertension Maternal Uncle         AGE 60'S     History   Drug Use No         Objective     /85   Pulse 73   Temp 98.3  F (36.8  C) (Tympanic)   Ht  "1.842 m (6' 0.5\")   Wt 111.6 kg (246 lb)   SpO2 96%   BMI 32.90 kg/m      Physical Exam    GENERAL APPEARANCE: healthy, alert and no distress     EYES: EOMI,  PERRL     HENT: ear canals and TM's normal and nose and mouth without ulcers or lesions     NECK: no adenopathy, no asymmetry, masses, or scars and thyroid normal to palpation     RESP: lungs clear to auscultation - no rales, rhonchi or wheezes     CV: regular rates and rhythm, normal S1 S2, no S3 or S4 and no murmur, click or rub     ABDOMEN:  soft, nontender, no HSM or masses and bowel sounds normal     MS: extremities normal- no gross deformities noted, no evidence of inflammation in joints, FROM in all extremities.     SKIN: no suspicious lesions or rashes     NEURO: Normal strength and tone, sensory exam grossly normal, mentation intact and speech normal     PSYCH: mentation appears normal. and affect normal/bright     LYMPHATICS: No cervical adenopathy    Recent Labs   Lab Test 02/26/21  1132 08/11/20  1410   HGB 14.1  --      --     142   POTASSIUM 3.2* 3.4   CR 0.74 0.89   A1C 7.5*  --         Diagnostics:  CBC, bmp pending.   EKG: appears normal, NSR, normal axis, normal intervals, no acute ST/T changes c/w ischemia, no LVH by voltage criteria, there are no prior tracings available    Revised Cardiac Risk Index (RCRI):  The patient has the following serious cardiovascular risks for perioperative complications:   - No serious cardiac risks = 0 points     RCRI Interpretation: 0 points: Class I (very low risk - 0.4% complication rate)           Signed Electronically by: Nirmal Lubin MD, MD  Copy of this evaluation report is provided to requesting physician.      "

## 2021-06-18 LAB
ANION GAP SERPL CALCULATED.3IONS-SCNC: 6 MMOL/L (ref 3–14)
BUN SERPL-MCNC: 9 MG/DL (ref 7–30)
CALCIUM SERPL-MCNC: 8.6 MG/DL (ref 8.5–10.1)
CHLORIDE SERPL-SCNC: 109 MMOL/L (ref 94–109)
CO2 SERPL-SCNC: 27 MMOL/L (ref 20–32)
CREAT SERPL-MCNC: 0.78 MG/DL (ref 0.66–1.25)
CREAT UR-MCNC: 95 MG/DL
GFR SERPL CREATININE-BSD FRML MDRD: 90 ML/MIN/{1.73_M2}
GLUCOSE SERPL-MCNC: 96 MG/DL (ref 70–99)
LDLC SERPL DIRECT ASSAY-MCNC: 69 MG/DL
MICROALBUMIN UR-MCNC: <5 MG/L
MICROALBUMIN/CREAT UR: NORMAL MG/G CR (ref 0–17)
POTASSIUM SERPL-SCNC: 3.6 MMOL/L (ref 3.4–5.3)
SODIUM SERPL-SCNC: 142 MMOL/L (ref 133–144)

## 2021-06-26 DIAGNOSIS — Z11.59 ENCOUNTER FOR SCREENING FOR OTHER VIRAL DISEASES: ICD-10-CM

## 2021-06-26 LAB
SARS-COV-2 RNA RESP QL NAA+PROBE: NORMAL
SPECIMEN SOURCE: NORMAL

## 2021-06-26 PROCEDURE — U0005 INFEC AGEN DETEC AMPLI PROBE: HCPCS | Performed by: ORTHOPAEDIC SURGERY

## 2021-06-26 PROCEDURE — U0003 INFECTIOUS AGENT DETECTION BY NUCLEIC ACID (DNA OR RNA); SEVERE ACUTE RESPIRATORY SYNDROME CORONAVIRUS 2 (SARS-COV-2) (CORONAVIRUS DISEASE [COVID-19]), AMPLIFIED PROBE TECHNIQUE, MAKING USE OF HIGH THROUGHPUT TECHNOLOGIES AS DESCRIBED BY CMS-2020-01-R: HCPCS | Performed by: ORTHOPAEDIC SURGERY

## 2021-06-27 LAB
LABORATORY COMMENT REPORT: NORMAL
SARS-COV-2 RNA RESP QL NAA+PROBE: NEGATIVE
SPECIMEN SOURCE: NORMAL

## 2021-06-29 ENCOUNTER — ANESTHESIA EVENT (OUTPATIENT)
Dept: SURGERY | Facility: AMBULATORY SURGERY CENTER | Age: 71
End: 2021-06-29
Payer: MEDICARE

## 2021-06-30 ENCOUNTER — ANESTHESIA (OUTPATIENT)
Dept: SURGERY | Facility: AMBULATORY SURGERY CENTER | Age: 71
End: 2021-06-30
Payer: MEDICARE

## 2021-06-30 ENCOUNTER — HOSPITAL ENCOUNTER (OUTPATIENT)
Facility: AMBULATORY SURGERY CENTER | Age: 71
End: 2021-06-30
Attending: ORTHOPAEDIC SURGERY | Admitting: ORTHOPAEDIC SURGERY
Payer: MEDICARE

## 2021-06-30 VITALS
TEMPERATURE: 98.6 F | HEART RATE: 75 BPM | OXYGEN SATURATION: 95 % | DIASTOLIC BLOOD PRESSURE: 86 MMHG | SYSTOLIC BLOOD PRESSURE: 141 MMHG | RESPIRATION RATE: 12 BRPM

## 2021-06-30 DIAGNOSIS — S83.231D COMPLEX TEAR OF MEDIAL MENISCUS OF RIGHT KNEE AS CURRENT INJURY, SUBSEQUENT ENCOUNTER: ICD-10-CM

## 2021-06-30 DIAGNOSIS — M94.261 CHONDROMALACIA OF RIGHT KNEE: ICD-10-CM

## 2021-06-30 DIAGNOSIS — Z98.890 S/P RIGHT KNEE ARTHROSCOPY: Primary | ICD-10-CM

## 2021-06-30 LAB — GLUCOSE BLDC GLUCOMTR-MCNC: 135 MG/DL (ref 70–99)

## 2021-06-30 PROCEDURE — 36415 COLL VENOUS BLD VENIPUNCTURE: CPT | Performed by: INTERNAL MEDICINE

## 2021-06-30 PROCEDURE — G8907 PT DOC NO EVENTS ON DISCHARG: HCPCS

## 2021-06-30 PROCEDURE — 29880 ARTHRS KNE SRG MNISECTMY M&L: CPT | Mod: RT

## 2021-06-30 PROCEDURE — G8916 PT W IV AB GIVEN ON TIME: HCPCS

## 2021-06-30 PROCEDURE — 82962 GLUCOSE BLOOD TEST: CPT | Performed by: INTERNAL MEDICINE

## 2021-06-30 PROCEDURE — 29880 ARTHRS KNE SRG MNISECTMY M&L: CPT | Mod: RT | Performed by: ORTHOPAEDIC SURGERY

## 2021-06-30 RX ORDER — ALBUTEROL SULFATE 0.83 MG/ML
2.5 SOLUTION RESPIRATORY (INHALATION) EVERY 4 HOURS PRN
Status: DISCONTINUED | OUTPATIENT
Start: 2021-06-30 | End: 2021-07-01 | Stop reason: HOSPADM

## 2021-06-30 RX ORDER — SODIUM CHLORIDE, SODIUM LACTATE, POTASSIUM CHLORIDE, CALCIUM CHLORIDE 600; 310; 30; 20 MG/100ML; MG/100ML; MG/100ML; MG/100ML
INJECTION, SOLUTION INTRAVENOUS CONTINUOUS
Status: DISCONTINUED | OUTPATIENT
Start: 2021-06-30 | End: 2021-07-01 | Stop reason: HOSPADM

## 2021-06-30 RX ORDER — HYDROCODONE BITARTRATE AND ACETAMINOPHEN 5; 325 MG/1; MG/1
1 TABLET ORAL
Status: DISCONTINUED | OUTPATIENT
Start: 2021-06-30 | End: 2021-07-01 | Stop reason: HOSPADM

## 2021-06-30 RX ORDER — PHYSOSTIGMINE SALICYLATE 1 MG/ML
1.2 INJECTION INTRAVENOUS
Status: DISCONTINUED | OUTPATIENT
Start: 2021-06-30 | End: 2021-07-01 | Stop reason: HOSPADM

## 2021-06-30 RX ORDER — MEPERIDINE HYDROCHLORIDE 25 MG/ML
12.5 INJECTION INTRAMUSCULAR; INTRAVENOUS; SUBCUTANEOUS
Status: DISCONTINUED | OUTPATIENT
Start: 2021-06-30 | End: 2021-07-01 | Stop reason: HOSPADM

## 2021-06-30 RX ORDER — FENTANYL CITRATE 50 UG/ML
INJECTION, SOLUTION INTRAMUSCULAR; INTRAVENOUS PRN
Status: DISCONTINUED | OUTPATIENT
Start: 2021-06-30 | End: 2021-06-30

## 2021-06-30 RX ORDER — ONDANSETRON 2 MG/ML
INJECTION INTRAMUSCULAR; INTRAVENOUS PRN
Status: DISCONTINUED | OUTPATIENT
Start: 2021-06-30 | End: 2021-06-30

## 2021-06-30 RX ORDER — LIDOCAINE 40 MG/G
CREAM TOPICAL
Status: DISCONTINUED | OUTPATIENT
Start: 2021-06-30 | End: 2021-07-01 | Stop reason: HOSPADM

## 2021-06-30 RX ORDER — METOPROLOL TARTRATE 1 MG/ML
1-2 INJECTION, SOLUTION INTRAVENOUS EVERY 5 MIN PRN
Status: DISCONTINUED | OUTPATIENT
Start: 2021-06-30 | End: 2021-07-01 | Stop reason: HOSPADM

## 2021-06-30 RX ORDER — OXYCODONE HYDROCHLORIDE 5 MG/1
5 TABLET ORAL EVERY 4 HOURS PRN
Status: DISCONTINUED | OUTPATIENT
Start: 2021-06-30 | End: 2021-07-01 | Stop reason: HOSPADM

## 2021-06-30 RX ORDER — NALOXONE HYDROCHLORIDE 0.4 MG/ML
0.4 INJECTION, SOLUTION INTRAMUSCULAR; INTRAVENOUS; SUBCUTANEOUS
Status: DISCONTINUED | OUTPATIENT
Start: 2021-06-30 | End: 2021-07-01 | Stop reason: HOSPADM

## 2021-06-30 RX ORDER — PROPOFOL 10 MG/ML
INJECTION, EMULSION INTRAVENOUS PRN
Status: DISCONTINUED | OUTPATIENT
Start: 2021-06-30 | End: 2021-06-30

## 2021-06-30 RX ORDER — BUPIVACAINE HYDROCHLORIDE AND EPINEPHRINE 2.5; 5 MG/ML; UG/ML
INJECTION, SOLUTION INFILTRATION; PERINEURAL PRN
Status: DISCONTINUED | OUTPATIENT
Start: 2021-06-30 | End: 2021-06-30 | Stop reason: HOSPADM

## 2021-06-30 RX ORDER — EPHEDRINE SULFATE 50 MG/ML
INJECTION, SOLUTION INTRAMUSCULAR; INTRAVENOUS; SUBCUTANEOUS PRN
Status: DISCONTINUED | OUTPATIENT
Start: 2021-06-30 | End: 2021-06-30

## 2021-06-30 RX ORDER — PROPOFOL 10 MG/ML
INJECTION, EMULSION INTRAVENOUS CONTINUOUS PRN
Status: DISCONTINUED | OUTPATIENT
Start: 2021-06-30 | End: 2021-06-30

## 2021-06-30 RX ORDER — ONDANSETRON 2 MG/ML
4 INJECTION INTRAMUSCULAR; INTRAVENOUS EVERY 30 MIN PRN
Status: DISCONTINUED | OUTPATIENT
Start: 2021-06-30 | End: 2021-07-01 | Stop reason: HOSPADM

## 2021-06-30 RX ORDER — LIDOCAINE HYDROCHLORIDE 20 MG/ML
INJECTION, SOLUTION INFILTRATION; PERINEURAL PRN
Status: DISCONTINUED | OUTPATIENT
Start: 2021-06-30 | End: 2021-06-30

## 2021-06-30 RX ORDER — NALOXONE HYDROCHLORIDE 0.4 MG/ML
0.2 INJECTION, SOLUTION INTRAMUSCULAR; INTRAVENOUS; SUBCUTANEOUS
Status: DISCONTINUED | OUTPATIENT
Start: 2021-06-30 | End: 2021-07-01 | Stop reason: HOSPADM

## 2021-06-30 RX ORDER — CEFAZOLIN SODIUM 2 G/100ML
2 INJECTION, SOLUTION INTRAVENOUS
Status: DISCONTINUED | OUTPATIENT
Start: 2021-06-30 | End: 2021-07-01 | Stop reason: HOSPADM

## 2021-06-30 RX ORDER — HYDRALAZINE HYDROCHLORIDE 20 MG/ML
2.5-5 INJECTION INTRAMUSCULAR; INTRAVENOUS EVERY 10 MIN PRN
Status: DISCONTINUED | OUTPATIENT
Start: 2021-06-30 | End: 2021-07-01 | Stop reason: HOSPADM

## 2021-06-30 RX ORDER — HYDROCODONE BITARTRATE AND ACETAMINOPHEN 5; 325 MG/1; MG/1
1-2 TABLET ORAL EVERY 4 HOURS PRN
Qty: 6 TABLET | Refills: 0 | Status: SHIPPED | OUTPATIENT
Start: 2021-06-30 | End: 2022-06-02

## 2021-06-30 RX ORDER — CEFAZOLIN SODIUM 2 G/100ML
2 INJECTION, SOLUTION INTRAVENOUS SEE ADMIN INSTRUCTIONS
Status: DISCONTINUED | OUTPATIENT
Start: 2021-06-30 | End: 2021-07-01 | Stop reason: HOSPADM

## 2021-06-30 RX ORDER — ACETAMINOPHEN 325 MG/1
975 TABLET ORAL ONCE
Status: COMPLETED | OUTPATIENT
Start: 2021-06-30 | End: 2021-06-30

## 2021-06-30 RX ORDER — ONDANSETRON 4 MG/1
4 TABLET, ORALLY DISINTEGRATING ORAL EVERY 30 MIN PRN
Status: DISCONTINUED | OUTPATIENT
Start: 2021-06-30 | End: 2021-07-01 | Stop reason: HOSPADM

## 2021-06-30 RX ORDER — FENTANYL CITRATE 50 UG/ML
25-50 INJECTION, SOLUTION INTRAMUSCULAR; INTRAVENOUS
Status: DISCONTINUED | OUTPATIENT
Start: 2021-06-30 | End: 2021-07-01 | Stop reason: HOSPADM

## 2021-06-30 RX ORDER — FENTANYL CITRATE 50 UG/ML
25-50 INJECTION, SOLUTION INTRAMUSCULAR; INTRAVENOUS EVERY 5 MIN PRN
Status: DISCONTINUED | OUTPATIENT
Start: 2021-06-30 | End: 2021-07-01 | Stop reason: HOSPADM

## 2021-06-30 RX ORDER — PHENYLEPHRINE HYDROCHLORIDE 10 MG/ML
INJECTION INTRAVENOUS PRN
Status: DISCONTINUED | OUTPATIENT
Start: 2021-06-30 | End: 2021-06-30

## 2021-06-30 RX ORDER — CELECOXIB 200 MG/1
200 CAPSULE ORAL
Status: COMPLETED | OUTPATIENT
Start: 2021-06-30 | End: 2021-06-30

## 2021-06-30 RX ADMIN — FENTANYL CITRATE 50 MCG: 50 INJECTION, SOLUTION INTRAMUSCULAR; INTRAVENOUS at 10:00

## 2021-06-30 RX ADMIN — ONDANSETRON 4 MG: 2 INJECTION INTRAMUSCULAR; INTRAVENOUS at 10:14

## 2021-06-30 RX ADMIN — PROPOFOL 200 MG: 10 INJECTION, EMULSION INTRAVENOUS at 10:00

## 2021-06-30 RX ADMIN — FENTANYL CITRATE 25 MCG: 50 INJECTION, SOLUTION INTRAMUSCULAR; INTRAVENOUS at 10:20

## 2021-06-30 RX ADMIN — PHENYLEPHRINE HYDROCHLORIDE 100 MCG: 10 INJECTION INTRAVENOUS at 10:13

## 2021-06-30 RX ADMIN — PHENYLEPHRINE HYDROCHLORIDE 100 MCG: 10 INJECTION INTRAVENOUS at 10:28

## 2021-06-30 RX ADMIN — PHENYLEPHRINE HYDROCHLORIDE 100 MCG: 10 INJECTION INTRAVENOUS at 10:21

## 2021-06-30 RX ADMIN — ACETAMINOPHEN 975 MG: 325 TABLET ORAL at 09:43

## 2021-06-30 RX ADMIN — PHENYLEPHRINE HYDROCHLORIDE 150 MCG: 10 INJECTION INTRAVENOUS at 10:35

## 2021-06-30 RX ADMIN — CELECOXIB 200 MG: 200 CAPSULE ORAL at 11:29

## 2021-06-30 RX ADMIN — SODIUM CHLORIDE, SODIUM LACTATE, POTASSIUM CHLORIDE, CALCIUM CHLORIDE: 600; 310; 30; 20 INJECTION, SOLUTION INTRAVENOUS at 09:43

## 2021-06-30 RX ADMIN — PROPOFOL 50 MCG/KG/MIN: 10 INJECTION, EMULSION INTRAVENOUS at 10:02

## 2021-06-30 RX ADMIN — PHENYLEPHRINE HYDROCHLORIDE 100 MCG: 10 INJECTION INTRAVENOUS at 10:10

## 2021-06-30 RX ADMIN — PHENYLEPHRINE HYDROCHLORIDE 100 MCG: 10 INJECTION INTRAVENOUS at 10:41

## 2021-06-30 RX ADMIN — CEFAZOLIN SODIUM 2 G: 2 INJECTION, SOLUTION INTRAVENOUS at 09:54

## 2021-06-30 RX ADMIN — EPHEDRINE SULFATE 5 MG: 50 INJECTION, SOLUTION INTRAMUSCULAR; INTRAVENOUS; SUBCUTANEOUS at 10:41

## 2021-06-30 RX ADMIN — LIDOCAINE HYDROCHLORIDE 40 MG: 20 INJECTION, SOLUTION INFILTRATION; PERINEURAL at 10:00

## 2021-06-30 RX ADMIN — OXYCODONE HYDROCHLORIDE 5 MG: 5 TABLET ORAL at 11:44

## 2021-06-30 NOTE — DISCHARGE INSTRUCTIONS
[facility]   Adult Discharge Orders & Instructions     For 24 hours after surgery    1. Get plenty of rest.  A responsible adult must stay with you for at least 24 hours after you leave the hospital.   2. Do not drive or use heavy equipment.  If you have weakness or tingling, don't drive or use heavy equipment until this feeling goes away.  3. Do not drink alcohol.  4. Avoid strenuous or risky activities.  Ask for help when climbing stairs.   5. You may feel lightheaded.  IF so, sit for a few minutes before standing.  Have someone help you get up.   6. If you have nausea (feel sick to your stomach): Drink only clear liquids such as apple juice, ginger ale, broth or 7-Up.  Rest may also help.  Be sure to drink enough fluids.  Move to a regular diet as you feel able.  7. You may have a slight fever. Call the doctor if your fever is over 100 F (37.7 C) (taken under the tongue) or lasts longer than 24 hours.  8. You may have a dry mouth, a sore throat, muscle aches or trouble sleeping.  These should go away after 24 hours.  9. Do not make important or legal decisions.     Call your doctor for any of the followin.  Signs of infection (fever, growing tenderness at the surgery site, a large amount of drainage or bleeding, severe pain, foul-smelling drainage, redness, swelling).    2. It has been over 8 to 10 hours since surgery and you are still not able to urinate (pass water).    3.  Headache for over 24 hours.                 4. Signs of Covid-19 infection (temperature over 100 degrees, shortness of breath, cough, loss of taste/smell, generalized body aches, persistent headache,                  chills, sore throat, nausea/vomiting/diarrhea).

## 2021-06-30 NOTE — OP NOTE
OPERATIVE NOTE    Date of Procedure: June 30, 2021    PRE-OP DIAGNOSIS:  Medial meniscus tear right knee and osteoarthritis     POST-OP DIAGNOSIS:  Medial meniscus tear, lateral meniscus tear  and osteoarthritis     PROCEDURE: Arthroscopic partial medial meniscectomy, partial lateral menisectomy and major chondroplasty, right knee.    SURGEON: Osvaldo    ASSISTANT(S):  KEILA Hills    ANESTHESIA:  General    EBL: 2cc  Complications: none   Specimen: none     INDICATIONS:  Patient is a 70 year old male with known osteoarthritis who had an injury of the knee 8 months ago. Since the injury, pain has been worse, with symptoms including sharp pains in flexion, swelling and pain with stairs.   Clinical evaluation, including MRI was consistent with a significant medial meniscus tear posteriorly with loose flaps, and of course his osteoarthritis.  Has failed conservative treatment, including viscosupplementation and corticosteroid injections. His osteoarthritis did not seem bad enough radiographically for total knee arthroplasty.  Informed consent was obtained for the procedure, The procedure had been discussed in detail with the patient, including alternatives to the proposed procedure, and expected recovery.  Risks were discussed, including, but not limited to infection, neurovascular injury, DVT/PE, failure to relieve pain, and anesthetic complications.      Surgical site was marked.    PROCEDURE IN DETAIL  The patient was taken to the operating room and placed on the  operating table in the supine position. General anesthesia was successfully achieved. Pause for site confirmation was done.  Then, a tourniquet was placed around the proximal thigh, the leg placed in the leg resendez, and the limb was prepped and draped in the usual sterile fashion. The anticipated portal sites were injected with 1/4% Marcaine with epinephrine, and was also injected intra-articularly.  Standard superomedial inflow portal was established  and inflow started.  Standard inferolateral scope portal was established, and the scope was introduced. Medial work portal was localized using a spinal needle.  The portal was made and probe introduced.  The tourniquet was not inflated during the case.     The following findings were noted at arthroscopy :  Medial compartment: Gr 3 and small areas of grade 4  articular cartilage changes, with loose articular cartilage fronds noted.   There were 2 flap tears of the medial meniscus, one based near the root and the other at the posterior junction. This likely represented a vertical tear that had sheared in the middle. // Lateral Compartment: mainly Gr 1  articular cartilage changes, but there was an area of grade 3 changes with loose articular cartilage on the medial aspect of the lateral tibial chondral surface.   There was a small, degenerative  lateral meniscus tear posteriorly as well, with a flap impinging under the lateral femoral condyle.  // Patellofemoral joint: Gr 2-3  articular cartilage changes  on the patella, Gr 3  articular cartilage changes on the trochlea, most advanced at the proximal-most aspect, and skipping to the distal most aspect.  ACL intact.    Using a combination of arthroscopic rongeurs and the motorized shaver, and the Werewolf RF device, the unstable portions of the medial and lateral meniscus were removed, and the remaining meniscal tissue was tapered.  Major chondroplasty was performed in all 3  Compartment(s), utilizing the shaver and the Werewolf RF device, spending 15 minutes on the lateral side, 20 minutes on the patellofemoral compartment, as well as about 10 minutes on the medial compartment.  The Gutters were checked for any loose bodies.      At this point, the excess fluid was suctioned from the knee, instruments were removed from the knee.  Steri strips were applied to the portal sites.  Additional 1/4% Marcaine was injected into the knee.  Sterile, compressive dressings were  applied.  Estimated blood loss was negligible.  Patient was awakened, and transferred to the recovery room in stable condition.    MONICA Riley MD  Dept. Orthopedic Surgery  [facility]

## 2021-06-30 NOTE — ANESTHESIA POSTPROCEDURE EVALUATION
Patient: Bill Smith    Procedure(s):  ARTHROSCOPY, RIGHT KNEE, WITH MEDIAL MENISCECTOMY, MAJOR CHONDROPLASTY.    Diagnosis:Complex tear of medial meniscus of right knee as current injury, subsequent encounter [S89.074D]  Chondromalacia of right knee [M94.477]  Diagnosis Additional Information: No value filed.    Anesthesia Type:  General    Note:  Disposition: Outpatient   Postop Pain Control: Uneventful            Sign Out: Well controlled pain   PONV: No   Neuro/Psych: Uneventful            Sign Out: Acceptable/Baseline neuro status   Airway/Respiratory: Uneventful            Sign Out: Acceptable/Baseline resp. status   CV/Hemodynamics: Uneventful            Sign Out: Acceptable CV status; No obvious hypovolemia; No obvious fluid overload   Other NRE: NONE   DID A NON-ROUTINE EVENT OCCUR?            Last vitals:  Vitals:    06/30/21 1110 06/30/21 1115 06/30/21 1130   BP: (!) 140/82 137/78 (!) 142/90   Pulse: 71 73 72   Resp: 16 14 12   Temp:      SpO2: 98% 99% 96%       Last vitals prior to Anesthesia Care Transfer:  CRNA VITALS  6/30/2021 1028 - 6/30/2021 1128      6/30/2021             Resp Rate (observed):  --          Electronically Signed By: Nidia Ross MD  June 30, 2021  11:53 AM

## 2021-06-30 NOTE — ANESTHESIA CARE TRANSFER NOTE
Patient: Bill Smith    Procedure(s):  ARTHROSCOPY, RIGHT KNEE, WITH MEDIAL MENISCECTOMY, MAJOR CHONDROPLASTY.    Diagnosis: Complex tear of medial meniscus of right knee as current injury, subsequent encounter [S83.628D]  Chondromalacia of right knee [M94.092]  Diagnosis Additional Information: No value filed.    Anesthesia Type:   General     Note:    Oropharynx: oropharynx clear of all foreign objects  Level of Consciousness: awake  Oxygen Supplementation: face mask  Level of Supplemental Oxygen (L/min / FiO2): 8  Independent Airway: airway patency satisfactory and stable  Dentition: dentition unchanged  Vital Signs Stable: post-procedure vital signs reviewed and stable  Report to RN Given: handoff report given  Patient transferred to: PACU  Comments: Prior to atraumatic LMA removal, patient awake, good aeration, SpO2 95%, equal bilateral breath sounds.  Transported to PACU on oxygen 8 lpm.  Patient awake, alert, SpO2 99% in PACU. No apparent anesthesia complications.   Handoff Report: Identifed the Patient, Identified the Reponsible Provider, Reviewed the pertinent medical history, Discussed the surgical course, Reviewed Intra-OP anesthesia mangement and issues during anesthesia, Set expectations for post-procedure period and Allowed opportunity for questions and acknowledgement of understanding      Vitals: (Last set prior to Anesthesia Care Transfer)  CRNA VITALS  6/30/2021 1028 - 6/30/2021 1103      6/30/2021             Resp Rate (observed):  (!) 1        Electronically Signed By: RAVI Dueñas CRNA  June 30, 2021  11:03 AM

## 2021-06-30 NOTE — ANESTHESIA PROCEDURE NOTES
Airway       Patient location during procedure: OR       Procedure Start/Stop Times: 6/30/2021 10:00 AM and 6/30/2021 10:02 AM  Staff -        CRNA: Nancy Wahl APRN CRNA       Performed By: anesthesiologistIndications and Patient Condition       Indications for airway management: rosa-procedural       Induction type:intravenous       Mask difficulty assessment: 1 - vent by mask    Final Airway Details       Final airway type: supraglottic airway    Supraglottic Airway Details        Type: LMA       Brand: LMA Unique       LMA size: 5       Airway Seal Pressure (cm H2O): 18    Post intubation assessment        Placement verified by: capnometry, equal breath sounds and chest rise        Number of attempts at approach: 1       Number of other approaches attempted: 0       Secured with: commercial tube resendez and plastic tape       Ease of procedure: easy       Dentition: Intact and Unchanged

## 2021-06-30 NOTE — ANESTHESIA PREPROCEDURE EVALUATION
Anesthesia Pre-Procedure Evaluation    Patient: Bill Smith   MRN: [id number] : 1950        Preoperative Diagnosis: Complex tear of medial meniscus of right knee as current injury, subsequent encounter [S83.231D]  Chondromalacia of right knee [M94.261]   Procedure : Procedure(s):  ARTHROSCOPY, RIGHT KNEE, WITH MEDIAL MENISCECTOMY, POSSIBLE CHONDROPLASTY     Past Medical History:   Diagnosis Date     Acute medial meniscus tear of right knee      Gout      History of hernia repair      HTN      Idiopathic chronic gout without tophus, unspecified site 2018     Parathyroid adenoma 2007    parathyroidectomy 2007      Past Surgical History:   Procedure Laterality Date     C APPENDECTOMY       HEAD & NECK SURGERY       HERNIA REPAIR       LAMINECT/DISCECTOMY, CERVICAL  1999      Allergies   Allergen Reactions     Bermuda Grass Other (See Comments)     Coughing sneezing     Lisinopril Cough     Molds & Smuts Other (See Comments)     Coughing sneezing  Coughing sneezing       Shellfish Allergy Other (See Comments)     Causes gout      Social History     Tobacco Use     Smoking status: Never Smoker     Smokeless tobacco: Never Used   Substance Use Topics     Alcohol use: Yes      Wt Readings from Last 1 Encounters:   21 111.6 kg (246 lb)        Anesthesia Evaluation   Pt has had prior anesthetic. Type: General.    No history of anesthetic complications       ROS/MED HX  ENT/Pulmonary:  - neg pulmonary ROS     Neurologic:  - neg neurologic ROS     Cardiovascular:     (+) hypertension-----    METS/Exercise Tolerance: >4 METS    Hematologic:  - neg hematologic  ROS     Musculoskeletal:   (+) arthritis,     GI/Hepatic:       Renal/Genitourinary:  - neg Renal ROS     Endo: Comment: Gout, s/p parathyroidectomy    (+) type II DM, Obesity,     Psychiatric/Substance Use:     (+) psychiatric history anxiety     Infectious Disease:  - neg infectious disease ROS     Malignancy:  - neg malignancy ROS     Other:   - neg other ROS          Physical Exam    Airway  airway exam normal      Mallampati: III   TM distance: > 3 FB   Neck ROM: full   Mouth opening: > 3 cm    Respiratory Devices and Support         Dental  no notable dental history         Cardiovascular   cardiovascular exam normal       Rhythm and rate: regular and normal     Pulmonary   pulmonary exam normal                OUTSIDE LABS:  CBC:   Lab Results   Component Value Date    WBC 10.3 06/17/2021    WBC 10.5 02/26/2021    HGB 14.2 06/17/2021    HGB 14.1 02/26/2021    HCT 42.5 06/17/2021    HCT 41.7 02/26/2021     06/17/2021     02/26/2021     BMP:   Lab Results   Component Value Date     06/17/2021     02/26/2021    POTASSIUM 3.6 06/17/2021    POTASSIUM 3.2 (L) 02/26/2021    CHLORIDE 109 06/17/2021    CHLORIDE 107 02/26/2021    CO2 27 06/17/2021    CO2 26 02/26/2021    BUN 9 06/17/2021    BUN 12 02/26/2021    CR 0.78 06/17/2021    CR 0.74 02/26/2021    GLC 96 06/17/2021     (H) 02/26/2021     COAGS: No results found for: PTT, INR, FIBR  POC: No results found for: BGM, HCG, HCGS  HEPATIC:   Lab Results   Component Value Date    ALBUMIN 3.7 02/26/2021    PROTTOTAL 7.1 02/26/2021    ALT 27 02/26/2021    AST 14 02/26/2021    ALKPHOS 87 02/26/2021    BILITOTAL 0.6 02/26/2021     OTHER:   Lab Results   Component Value Date    A1C 6.3 (H) 06/17/2021    BRAXTON 8.6 06/17/2021       Anesthesia Plan    ASA Status:  2   NPO Status:  NPO Appropriate    Anesthesia Type: General.     - Airway: ETT   Induction: Intravenous.   Maintenance: Balanced.        Consents    Anesthesia Plan(s) and associated risks, benefits, and realistic alternatives discussed. Questions answered and patient/representative(s) expressed understanding.     - Discussed with:  Patient      - Extended Intubation/Ventilatory Support Discussed: No.      - Patient is DNR/DNI Status: No    Use of blood products discussed: No .     Postoperative Care       PONV prophylaxis:  Ondansetron (or other 5HT-3), Background Propofol Infusion     Comments:    GETA, Standard ASA monitoring  All available and pertinent medical records and test results reviewed.  Risks, including but not limited to airway injury, bronchospasm,  hypoxemia, PONV d/w patient            Nidia Ross MD

## 2021-07-01 ENCOUNTER — TELEPHONE (OUTPATIENT)
Dept: ORTHOPEDICS | Facility: CLINIC | Age: 71
End: 2021-07-01

## 2021-07-01 RX ORDER — HYDROCODONE BITARTRATE AND ACETAMINOPHEN 5; 325 MG/1; MG/1
1 TABLET ORAL EVERY 6 HOURS PRN
Qty: 15 TABLET | Refills: 0 | Status: SHIPPED | OUTPATIENT
Start: 2021-07-01 | End: 2022-06-02

## 2021-07-01 NOTE — TELEPHONE ENCOUNTER
Called and let patient know that prescription was called into pharmacy. His wife if going to pick it up.  Iona Jorge RN

## 2021-07-01 NOTE — TELEPHONE ENCOUNTER
[facility] Call Center    Phone Message    May a detailed message be left on voicemail: yes     Reason for Call: Medication Question or concern regarding medication   Patient declined pain meds after surgery yesterday but would like to have them sent to his pharmacy in [facility]. Please call to confirm.     Action Taken: Message routed to:  Adult Clinics: Orthopedics p [phone]    Travel Screening: Not Applicable

## 2021-07-09 NOTE — PROGRESS NOTES
Follow-up right knee arthroscopy  Date of surgery: 6/30/21  PROCEDURE: Arthroscopic partial medial meniscectomy, partial lateral menisectomy and major chondroplasty, right knee.    The following findings were noted at arthroscopy :  Medial compartment: Gr 3 and small areas of grade 4  articular cartilage changes, with loose articular cartilage fronds noted.   There were 2 flap tears of the medial meniscus, one based near the root and the other at the posterior junction. This likely represented a vertical tear that had sheared in the middle. // Lateral Compartment: mainly Gr 1  articular cartilage changes, but there was an area of grade 3 changes with loose articular cartilage on the medial aspect of the lateral tibial chondral surface.   There was a small, degenerative  lateral meniscus tear posteriorly as well, with a flap impinging under the lateral femoral condyle.  // Patellofemoral joint: Gr 2-3  articular cartilage changes  on the patella, Gr 3  articular cartilage changes on the trochlea, most advanced at the proximal-most aspect, and skipping to the distal most aspect.  ACL intact    He reports that the pain is a bit better compared to preop.    Review of Systems:  Constitutional/General: Negative for fever, chills, change in weight  Integumentary/Skin: Negative for worrisome rashes, moles, or lesions  Neuro: Negative for weakness, dizziness, or paresthesias   Psychiatric: negative for changes in mood or affect    OBJECTIVE:  Physical Exam:  BP (!) 144/75 (BP Location: Left arm, Patient Position: Sitting, Cuff Size: Adult Regular)    General Appearance: healthy, alert and no distress   Skin: no suspicious lesions or rashes  Neuro: Normal strength and tone, mentation intact and speech normal  Vascular: good pulses, and cappillary refill   Lymph: no lymphadenopathy   Psych:  mentation appears normal and affect normal/bright  Resp: no increased work of breathing    Right knee Exam:  Inspection: Wounds healed,  no evidence of infection.  Moderate effusion  Range of motion : 0-125  Calf non-tender     ASSESSMENT:   Doing fairly well. status post knee arthroscopy   Encounter Diagnoses   Name Primary?     S/P right knee arthroscopy      Postop check      Complex tear of medial meniscus of right knee, unspecified whether old or current tear, subsequent encounter Yes     Other tear of lateral meniscus of right knee, unspecified whether old or current tear, subsequent encounter      Primary osteoarthritis of right knee         PLAN:   I reviewed the operative findings with him     Will continue range of motion and strengthening.  Scar massage.  nsaids   Wrapping   Advance activity as tolerated.  RTC 3-4 weeks if still painful.    MONICA Riley MD  Dept. Orthopedic Surgery  [facility]

## 2021-07-13 ENCOUNTER — OFFICE VISIT (OUTPATIENT)
Dept: ORTHOPEDICS | Facility: CLINIC | Age: 71
End: 2021-07-13
Payer: MEDICARE

## 2021-07-13 VITALS — SYSTOLIC BLOOD PRESSURE: 144 MMHG | DIASTOLIC BLOOD PRESSURE: 75 MMHG

## 2021-07-13 DIAGNOSIS — S83.281D OTHER TEAR OF LATERAL MENISCUS OF RIGHT KNEE, UNSPECIFIED WHETHER OLD OR CURRENT TEAR, SUBSEQUENT ENCOUNTER: ICD-10-CM

## 2021-07-13 DIAGNOSIS — Z98.890 S/P RIGHT KNEE ARTHROSCOPY: ICD-10-CM

## 2021-07-13 DIAGNOSIS — M17.11 PRIMARY OSTEOARTHRITIS OF RIGHT KNEE: ICD-10-CM

## 2021-07-13 DIAGNOSIS — S83.231D COMPLEX TEAR OF MEDIAL MENISCUS OF RIGHT KNEE, UNSPECIFIED WHETHER OLD OR CURRENT TEAR, SUBSEQUENT ENCOUNTER: Primary | ICD-10-CM

## 2021-07-13 DIAGNOSIS — Z09 POSTOP CHECK: ICD-10-CM

## 2021-07-13 PROCEDURE — 99024 POSTOP FOLLOW-UP VISIT: CPT | Performed by: ORTHOPAEDIC SURGERY

## 2021-07-13 NOTE — NURSING NOTE
Bill to follow up with Primary Care provider regarding elevated blood pressure.    Franky PALACIOS

## 2021-07-13 NOTE — LETTER
7/13/2021         RE: Bill Smith  [address] [facility]  [place] [address]        Dear Colleague,    Thank you for referring your patient, Bill Smith, to the [facility]. Please see a copy of my visit note below.    Follow-up right knee arthroscopy  Date of surgery: 6/30/21  PROCEDURE: Arthroscopic partial medial meniscectomy, partial lateral menisectomy and major chondroplasty, right knee.    The following findings were noted at arthroscopy :  Medial compartment: Gr 3 and small areas of grade 4  articular cartilage changes, with loose articular cartilage fronds noted.   There were 2 flap tears of the medial meniscus, one based near the root and the other at the posterior junction. This likely represented a vertical tear that had sheared in the middle. // Lateral Compartment: mainly Gr 1  articular cartilage changes, but there was an area of grade 3 changes with loose articular cartilage on the medial aspect of the lateral tibial chondral surface.   There was a small, degenerative  lateral meniscus tear posteriorly as well, with a flap impinging under the lateral femoral condyle.  // Patellofemoral joint: Gr 2-3  articular cartilage changes  on the patella, Gr 3  articular cartilage changes on the trochlea, most advanced at the proximal-most aspect, and skipping to the distal most aspect.  ACL intact    He reports that the pain is a bit better compared to preop.    Review of Systems:  Constitutional/General: Negative for fever, chills, change in weight  Integumentary/Skin: Negative for worrisome rashes, moles, or lesions  Neuro: Negative for weakness, dizziness, or paresthesias   Psychiatric: negative for changes in mood or affect    OBJECTIVE:  Physical Exam:  BP (!) 144/75 (BP Location: Left arm, Patient Position: Sitting, Cuff Size: Adult Regular)    General Appearance: healthy, alert and no distress   Skin: no suspicious lesions or rashes  Neuro: Normal strength and tone,  mentation intact and speech normal  Vascular: good pulses, and cappillary refill   Lymph: no lymphadenopathy   Psych:  mentation appears normal and affect normal/bright  Resp: no increased work of breathing    Right knee Exam:  Inspection: Wounds healed, no evidence of infection.  Moderate effusion  Range of motion : 0-125  Calf non-tender     ASSESSMENT:   Doing fairly well. status post knee arthroscopy   Encounter Diagnoses   Name Primary?     S/P right knee arthroscopy      Postop check      Complex tear of medial meniscus of right knee, unspecified whether old or current tear, subsequent encounter Yes     Other tear of lateral meniscus of right knee, unspecified whether old or current tear, subsequent encounter      Primary osteoarthritis of right knee         PLAN:   I reviewed the operative findings with him     Will continue range of motion and strengthening.  Scar massage.  nsaids   Wrapping   Advance activity as tolerated.  RTC 3-4 weeks if still painful.    MONICA Riley MD  Dept. Orthopedic Surgery  [facility]        Again, thank you for allowing me to participate in the care of your patient.        Sincerely,        Jens Riley MD

## 2021-08-24 DIAGNOSIS — I10 ESSENTIAL HYPERTENSION WITH GOAL BLOOD PRESSURE LESS THAN 140/90: ICD-10-CM

## 2021-08-24 RX ORDER — CARVEDILOL 25 MG/1
TABLET ORAL
Qty: 180 TABLET | Refills: 3 | Status: SHIPPED | OUTPATIENT
Start: 2021-08-24 | End: 2022-10-04

## 2021-08-24 RX ORDER — HYDRALAZINE HYDROCHLORIDE 50 MG/1
TABLET, FILM COATED ORAL
Qty: 270 TABLET | Refills: 3 | Status: SHIPPED | OUTPATIENT
Start: 2021-08-24 | End: 2022-10-04

## 2021-08-24 RX ORDER — CHLORTHALIDONE 25 MG/1
25 TABLET ORAL EVERY MORNING
Qty: 90 TABLET | Refills: 3 | Status: SHIPPED | OUTPATIENT
Start: 2021-08-24 | End: 2022-10-04

## 2021-08-24 NOTE — TELEPHONE ENCOUNTER
"Requested Prescriptions   Pending Prescriptions Disp Refills     hydrALAZINE (APRESOLINE) 50 MG tablet [Pharmacy Med Name: hydrALAZINE HCl 50 MG Oral Tablet] 270 tablet 0     Sig: TAKE 1 TABLET BY MOUTH THREE TIMES DAILY FOR BLOOD PRESSURE       Vasodilators Failed - 8/24/2021  2:25 PM        Failed - Most recent BP less than 140/90 on record     BP Readings from Last 3 Encounters:   07/13/21 (!) 144/75   06/30/21 (!) 141/86   06/17/21 138/85                 Passed - Most recent encounter is not a hospital encounter. Patient has recent (12 mos) or future (1 mos) visit with authorizing provider's specialty     Patient's most recent encounter is NOT a hospital encounter and has had an office visit in the last 12 months or has a visit in the next 30 days with authorizing provider or within the authorizing provider's specialty.      See \"Patient Info\" tab in inbasket, or \"Choose Columns\" in Meds & Orders section of the refill encounter.      If most recent encounter is a hospital encounter AND the patient does NOT have an appointment scheduled with the authorizing provider or authorizing provider's specialty within the next 30 days, forward refill to authorizing provider for medication review.          Passed - Medication is active on med list        Passed - Patient is of age 18 years or older           carvedilol (COREG) 25 MG tablet 180 tablet 3     Sig: TAKE 1 TABLET BY MOUTH TWICE DAILY WITH MEALS FOR BLOOD PRESSURE       Beta-Blockers Protocol Failed - 8/24/2021  2:25 PM        Failed - Blood pressure under 140/90 in past 12 months     BP Readings from Last 3 Encounters:   07/13/21 (!) 144/75   06/30/21 (!) 141/86   06/17/21 138/85                 Passed - Patient is age 6 or older        Passed - Recent (12 mo) or future (30 days) visit within the authorizing provider's specialty     Patient has had an office visit with the authorizing provider or a provider within the authorizing providers department within the " "previous 12 mos or has a future within next 30 days. See \"Patient Info\" tab in inbasket, or \"Choose Columns\" in Meds & Orders section of the refill encounter.              Passed - Medication is active on med list           chlorthalidone (HYGROTON) 25 MG tablet 90 tablet 3     Sig: Take 1 tablet (25 mg) by mouth every morning For blood pressure.       Diuretics (Including Combos) Protocol Failed - 8/24/2021  2:25 PM        Failed - Blood pressure under 140/90 in past 12 months     BP Readings from Last 3 Encounters:   07/13/21 (!) 144/75   06/30/21 (!) 141/86   06/17/21 138/85                 Passed - Recent (12 mo) or future (30 days) visit within the authorizing provider's specialty     Patient has had an office visit with the authorizing provider or a provider within the authorizing providers department within the previous 12 mos or has a future within next 30 days. See \"Patient Info\" tab in inMediaRoostet, or \"Choose Columns\" in Meds & Orders section of the refill encounter.              Passed - Medication is active on med list        Passed - Patient is age 18 or older        Passed - Normal serum creatinine on file in past 12 months     Recent Labs   Lab Test 06/17/21  1746   CR 0.78              Passed - Normal serum potassium on file in past 12 months     Recent Labs   Lab Test 06/17/21  1746   POTASSIUM 3.6                    Passed - Normal serum sodium on file in past 12 months     Recent Labs   Lab Test 06/17/21  1746                      Jessica ALFARON, RN    "

## 2021-09-11 DIAGNOSIS — M1A.00X0 IDIOPATHIC CHRONIC GOUT WITHOUT TOPHUS, UNSPECIFIED SITE: ICD-10-CM

## 2021-09-13 NOTE — TELEPHONE ENCOUNTER
"Routing refill request to provider for review/approval because:  Requested Prescriptions   Pending Prescriptions Disp Refills    allopurinol (ZYLOPRIM) 100 MG tablet 180 tablet 3     Sig: TAKE 1 TABLET BY MOUTH TWICE DAILY FOR  GOUT        Gout Agents Protocol Failed - 9/13/2021  3:24 PM        Failed - Has Uric Acid on file in past 12 months and value is less than 6     No lab results found.  If level is 6mg/dL or greater, ok to refill one time and refer to provider.           Passed - CBC on file in past 12 months     Recent Labs   Lab Test 06/17/21  1746   WBC 10.3   RBC 4.91   HGB 14.2   HCT 42.5                    Passed - ALT on file in past 12 months     Recent Labs   Lab Test 02/26/21  1132   ALT 27             Passed - Recent (12 mo) or future (30 days) visit within the authorizing provider's specialty     Patient has had an office visit with the authorizing provider or a provider within the authorizing providers department within the previous 12 mos or has a future within next 30 days. See \"Patient Info\" tab in inbasket, or \"Choose Columns\" in Meds & Orders section of the refill encounter.              Passed - Medication is active on med list        Passed - Patient is age 18 or older        Passed - Normal serum creatinine on file in the past 12 months     Recent Labs   Lab Test 06/17/21  1746   CR 0.78       Ok to refill medication if creatinine is low                  Mary ALFARON, RN        "

## 2021-09-13 NOTE — TELEPHONE ENCOUNTER
allopurinol (ZYLOPRIM) 100 MG tablet 180 tablet 3 8/11/2020      Refills are needed. Patient is going out of town.

## 2021-09-14 RX ORDER — ALLOPURINOL 100 MG/1
TABLET ORAL
Qty: 180 TABLET | Refills: 3 | Status: SHIPPED | OUTPATIENT
Start: 2021-09-14 | End: 2022-10-04

## 2021-09-16 ENCOUNTER — OFFICE VISIT (OUTPATIENT)
Dept: ORTHOPEDICS | Facility: CLINIC | Age: 71
End: 2021-09-16
Payer: MEDICARE

## 2021-09-16 VITALS — BODY MASS INDEX: 32.6 KG/M2 | HEIGHT: 73 IN | WEIGHT: 246 LBS

## 2021-09-16 DIAGNOSIS — M17.11 PRIMARY OSTEOARTHRITIS OF RIGHT KNEE: Primary | ICD-10-CM

## 2021-09-16 DIAGNOSIS — M17.12 PRIMARY OSTEOARTHRITIS OF LEFT KNEE: ICD-10-CM

## 2021-09-16 PROCEDURE — 20610 DRAIN/INJ JOINT/BURSA W/O US: CPT | Mod: LT | Performed by: FAMILY MEDICINE

## 2021-09-16 PROCEDURE — 99207 PR DROP WITH A PROCEDURE: CPT | Performed by: FAMILY MEDICINE

## 2021-09-16 RX ORDER — TRIAMCINOLONE ACETONIDE 40 MG/ML
40 INJECTION, SUSPENSION INTRA-ARTICULAR; INTRAMUSCULAR
Status: DISCONTINUED | OUTPATIENT
Start: 2021-09-16 | End: 2023-08-02

## 2021-09-16 RX ADMIN — TRIAMCINOLONE ACETONIDE 40 MG: 40 INJECTION, SUSPENSION INTRA-ARTICULAR; INTRAMUSCULAR at 09:31

## 2021-09-16 ASSESSMENT — MIFFLIN-ST. JEOR: SCORE: 1911.79

## 2021-09-16 NOTE — LETTER
"    9/16/2021         RE: Bill Smith  [address] [place] [address]  [place] [address]        Dear Colleague,    Thank you for referring your patient, Bill Smith, to the [facility] [place]. Please see a copy of my visit note below.    Mr. Smith is here for bilateral knee injections.  He had a right knee arthroscopy done about 3 months ago that unfortunately provided him no relief.  He is contemplating knee replacement.  His left knee is bothering him as well.        [facility] FOLLOW-UP VISIT 9/16/2021    Bill Smith's chief complaint for this visit includes:  Chief Complaint   Patient presents with     RECHECK     right knee joint cortisone injection      PCP: Nirmal Lubin    Referring Provider:  No referring provider defined for this encounter.    Ht 1.842 m (6' 0.5\")   Wt 111.6 kg (246 lb)   BMI 32.90 kg/m    Data Unavailable       Large Joint Injection/Arthocentesis: bilateral knee    Date/Time: 9/16/2021 9:31 AM  Performed by: Patrick Patel DO  Authorized by: Patrick Patel DO     Indications:  Pain  Needle Size:  22 G  Guidance: landmark guided    Location:  Knee  Laterality:  Bilateral      Medications (Right):  40 mg triamcinolone 40 MG/ML  Medications (Left):  40 mg triamcinolone 40 MG/ML  Outcome:  Tolerated well, no immediate complications  Procedure discussed: discussed risks, benefits, and alternatives    Consent Given by:  Patient  Timeout: timeout called immediately prior to procedure    Prep: patient was prepped and draped in usual sterile fashion         PROCEDURE    Knee Injections - Intraarticular    The patient was informed of the risks and the benefits of the procedure and a written consent was signed.    The patient s left knee was prepped with chlorhexidine in sterile fashion.   40 mg of triamcinolone suspension was drawn up into a 5 mL syringe with 4 mL of 1% lidocaine.  Injection was performed using substerile technique.  A " 1.5-inch 22-gauge needle was used to enter the lateral aspect of the left knee.  Injection performed successfully without difficulty.  There were no complications. The patient tolerated the procedure well. There was negligible bleeding.     The patient's right knee was prepped with chlorhexidine in sterile fashion.   40 mg of triamcinolone suspension was drawn up into a 5 mL syringe with 4 mL of 1% lidocaine.  Injection was performed using substerile technique.  A 1.5-inch 22-gauge needle was used to enter the lateral aspect of the right knee.  Injection performed successfully without difficulty.  There were no complications. The patient tolerated the procedure well. There was negligible bleeding.           Scribed by Melanie Aguillon ATC for Dr. Patel on 9/16/21 at 9:30 AM, based on the providers statements to me.         Again, thank you for allowing me to participate in the care of your patient.        Sincerely,        Patrick Patel DO

## 2021-09-16 NOTE — PROGRESS NOTES
"Mr. Smith is here for bilateral knee injections.  He had a right knee arthroscopy done about 3 months ago that unfortunately provided him no relief.  He is contemplating knee replacement.  His left knee is bothering him as well.        [facility] FOLLOW-UP VISIT 9/16/2021    Bill Smith's chief complaint for this visit includes:  Chief Complaint   Patient presents with     RECHECK     right knee joint cortisone injection      PCP: Nirmal Lubin    Referring Provider:  No referring provider defined for this encounter.    Ht 1.842 m (6' 0.5\")   Wt 111.6 kg (246 lb)   BMI 32.90 kg/m    Data Unavailable       Large Joint Injection/Arthocentesis: bilateral knee    Date/Time: 9/16/2021 9:31 AM  Performed by: Patrick Patel DO  Authorized by: Patrick Patel DO     Indications:  Pain  Needle Size:  22 G  Guidance: landmark guided    Location:  Knee  Laterality:  Bilateral      Medications (Right):  40 mg triamcinolone 40 MG/ML  Medications (Left):  40 mg triamcinolone 40 MG/ML  Outcome:  Tolerated well, no immediate complications  Procedure discussed: discussed risks, benefits, and alternatives    Consent Given by:  Patient  Timeout: timeout called immediately prior to procedure    Prep: patient was prepped and draped in usual sterile fashion         PROCEDURE    Knee Injections - Intraarticular    The patient was informed of the risks and the benefits of the procedure and a written consent was signed.    The patient s left knee was prepped with chlorhexidine in sterile fashion.   40 mg of triamcinolone suspension was drawn up into a 5 mL syringe with 4 mL of 1% lidocaine.  Injection was performed using substerile technique.  A 1.5-inch 22-gauge needle was used to enter the lateral aspect of the left knee.  Injection performed successfully without difficulty.  There were no complications. The patient tolerated the procedure well. There was negligible bleeding.     The patient's right knee was " prepped with chlorhexidine in sterile fashion.   40 mg of triamcinolone suspension was drawn up into a 5 mL syringe with 4 mL of 1% lidocaine.  Injection was performed using substerile technique.  A 1.5-inch 22-gauge needle was used to enter the lateral aspect of the right knee.  Injection performed successfully without difficulty.  There were no complications. The patient tolerated the procedure well. There was negligible bleeding.           Scribed by Melanie Aguillon ATC for Dr. Patel on 9/16/21 at 9:30 AM, based on the providers statements to me.

## 2021-09-25 ENCOUNTER — HEALTH MAINTENANCE LETTER (OUTPATIENT)
Age: 71
End: 2021-09-25

## 2021-10-21 ENCOUNTER — TELEPHONE (OUTPATIENT)
Dept: FAMILY MEDICINE | Facility: CLINIC | Age: 71
End: 2021-10-21

## 2021-10-21 ENCOUNTER — MYC MEDICAL ADVICE (OUTPATIENT)
Dept: FAMILY MEDICINE | Facility: CLINIC | Age: 71
End: 2021-10-21

## 2021-10-21 NOTE — TELEPHONE ENCOUNTER
Reason for Call:  Same Day Appointment, Requested Provider:  Nirmal Lubin MD    PCP: Nirmal Lubin    Reason for visit: hand tremors    Duration of symptoms: on and off    Have you been treated for this in the past? No    Additional comments: Patient is scheduled for 11/10 but would like to get worked in sooner with Dr Lubin    Can we leave a detailed message on this number? YES    Phone number patient can be reached at: Home number on file [phone] (home)    Best Time: any    Call taken on 10/21/2021 at 1:09 PM by Beverly Gilliam

## 2021-10-21 NOTE — TELEPHONE ENCOUNTER
Okay for patient to be seen tomorrow afternoon. Okay to double book. New week okay as well with same day slots or double book.    Nirmal Lubin MD

## 2021-10-21 NOTE — TELEPHONE ENCOUNTER
Patient has an appt with Dr Lubin scheduled for 10/22/2021 at 2:00 pm .  [facility]  2nd Floor  Primary Care

## 2021-10-22 ENCOUNTER — OFFICE VISIT (OUTPATIENT)
Dept: FAMILY MEDICINE | Facility: CLINIC | Age: 71
End: 2021-10-22
Payer: MEDICARE

## 2021-10-22 VITALS
HEART RATE: 70 BPM | SYSTOLIC BLOOD PRESSURE: 128 MMHG | BODY MASS INDEX: 32.1 KG/M2 | HEIGHT: 73 IN | DIASTOLIC BLOOD PRESSURE: 79 MMHG | OXYGEN SATURATION: 95 % | WEIGHT: 242.2 LBS | TEMPERATURE: 98.3 F

## 2021-10-22 DIAGNOSIS — I10 ESSENTIAL HYPERTENSION WITH GOAL BLOOD PRESSURE LESS THAN 140/90: ICD-10-CM

## 2021-10-22 DIAGNOSIS — R25.1 TREMOR: Primary | ICD-10-CM

## 2021-10-22 DIAGNOSIS — E11.9 TYPE 2 DIABETES MELLITUS WITHOUT COMPLICATION, WITHOUT LONG-TERM CURRENT USE OF INSULIN (H): ICD-10-CM

## 2021-10-22 LAB
HBA1C MFR BLD: 6.5 % (ref 0–5.6)
TSH SERPL DL<=0.005 MIU/L-ACNC: 0.96 MU/L (ref 0.4–4)

## 2021-10-22 PROCEDURE — 99214 OFFICE O/P EST MOD 30 MIN: CPT | Performed by: FAMILY MEDICINE

## 2021-10-22 PROCEDURE — 84443 ASSAY THYROID STIM HORMONE: CPT | Performed by: FAMILY MEDICINE

## 2021-10-22 PROCEDURE — 36415 COLL VENOUS BLD VENIPUNCTURE: CPT | Performed by: FAMILY MEDICINE

## 2021-10-22 PROCEDURE — 83036 HEMOGLOBIN GLYCOSYLATED A1C: CPT | Performed by: FAMILY MEDICINE

## 2021-10-22 ASSESSMENT — MIFFLIN-ST. JEOR: SCORE: 1889.55

## 2021-10-22 ASSESSMENT — PAIN SCALES - GENERAL: PAINLEVEL: NO PAIN (0)

## 2021-10-22 NOTE — PROGRESS NOTES
"    Kylee Khan is a 71 year old who presents for the following health issues:    HPI     Patient c/o upper extremity tremors for the last 2 years and seem to be worsening the last few months especially on left side. No weakness, numbness, tingling.    Diabetes Follow-up    How often are you checking your blood sugar? One time daily  What time of day are you checking your blood sugars (select all that apply)?  Before and after meals  Have you had any blood sugars above 200?  No  Have you had any blood sugars below 70?  No    What symptoms do you notice when your blood sugar is low?  None    What concerns do you have today about your diabetes? None     Do you have any of these symptoms? (Select all that apply)  No numbness or tingling in feet.  No redness, sores or blisters on feet.  No complaints of excessive thirst.  No reports of blurry vision.  No significant changes to weight.    Have you had a diabetic eye exam in the last 12 months? No        BP Readings from Last 2 Encounters:   10/22/21 128/79   07/13/21 (!) 144/75     Hemoglobin A1C (%)   Date Value   06/17/2021 6.3 (H)   02/26/2021 7.5 (H)     LDL Cholesterol Calculated (mg/dL)   Date Value   08/11/2020 52   07/22/2019 63     LDL Cholesterol Direct (mg/dL)   Date Value   06/17/2021 69       Hypertension Follow-up      Do you check your blood pressure regularly outside of the clinic? Yes     Are you following a low salt diet? Yes    Are your blood pressures ever more than 140 on the top number (systolic) OR more   than 90 on the bottom number (diastolic), for example 140/90? No      Review of Systems   Constitutional, HEENT, cardiovascular, pulmonary, GI, , musculoskeletal, neuro, skin, endocrine and psych systems are negative, except as otherwise noted.      Objective    /79   Pulse 70   Temp 98.3  F (36.8  C) (Tympanic)   Ht 1.842 m (6' 0.5\")   Wt 109.9 kg (242 lb 3.2 oz)   SpO2 95%   BMI 32.40 kg/m    Body mass index is 32.4 " kg/m .  Physical Exam   GENERAL: healthy, alert and no distress  NECK: no adenopathy, no asymmetry, masses, or scars and thyroid normal to palpation  RESP: lungs clear to auscultation - no rales, rhonchi or wheezes  CV: regular rate and rhythm, normal S1 S2, no S3 or S4, no murmur, click or rub, no peripheral edema and peripheral pulses strong  ABDOMEN: soft, nontender, no hepatosplenomegaly, no masses and bowel sounds normal  MS: no gross musculoskeletal defects noted, no edema  Diabetic foot exam: normal DP and PT pulses, no trophic changes or ulcerative lesions and normal sensory exam  Neuro: tremors of upper extremities. L>R    A/P:  (R25.1) Tremor  (primary encounter diagnosis)  Comment:   Plan: TSH with free T4 reflex, Adult Neurology         Referral        Referred to Neurology for evaluation and recommendations.    (E11.9) Type 2 diabetes mellitus without complication, without long-term current use of insulin (H)  Comment:   Plan: Hemoglobin A1c        Usually controlled. Recheck in 6 months.    (I10) Essential hypertension with goal blood pressure less than 140/90  Comment:   Plan: controlled.    Nirmal Lubin MD

## 2021-10-26 ENCOUNTER — LAB (OUTPATIENT)
Dept: LAB | Facility: CLINIC | Age: 71
End: 2021-10-26
Payer: MEDICARE

## 2021-10-26 DIAGNOSIS — Z13.220 SCREENING FOR HYPERLIPIDEMIA: ICD-10-CM

## 2021-10-26 DIAGNOSIS — E11.9 TYPE 2 DIABETES MELLITUS WITHOUT COMPLICATION, WITHOUT LONG-TERM CURRENT USE OF INSULIN (H): ICD-10-CM

## 2021-10-26 DIAGNOSIS — R73.03 PREDIABETES: ICD-10-CM

## 2021-10-26 LAB
CHOLEST SERPL-MCNC: 119 MG/DL
FASTING STATUS PATIENT QL REPORTED: YES
HBA1C MFR BLD: 6.6 % (ref 0–5.6)
HDLC SERPL-MCNC: 50 MG/DL
LDLC SERPL CALC-MCNC: 49 MG/DL
NONHDLC SERPL-MCNC: 69 MG/DL
TRIGL SERPL-MCNC: 100 MG/DL

## 2021-10-26 PROCEDURE — 36415 COLL VENOUS BLD VENIPUNCTURE: CPT

## 2021-10-26 PROCEDURE — 83036 HEMOGLOBIN GLYCOSYLATED A1C: CPT

## 2021-10-26 PROCEDURE — 80061 LIPID PANEL: CPT

## 2021-11-09 ENCOUNTER — TRANSFERRED RECORDS (OUTPATIENT)
Dept: HEALTH INFORMATION MANAGEMENT | Facility: CLINIC | Age: 71
End: 2021-11-09
Payer: MEDICARE

## 2021-11-09 LAB — RETINOPATHY: NEGATIVE

## 2021-12-16 ENCOUNTER — MYC MEDICAL ADVICE (OUTPATIENT)
Dept: ORTHOPEDICS | Facility: CLINIC | Age: 71
End: 2021-12-16
Payer: MEDICARE

## 2021-12-16 DIAGNOSIS — M17.11 PRIMARY OSTEOARTHRITIS OF RIGHT KNEE: Primary | ICD-10-CM

## 2021-12-16 DIAGNOSIS — M17.12 PRIMARY OSTEOARTHRITIS OF LEFT KNEE: ICD-10-CM

## 2022-03-12 ENCOUNTER — HEALTH MAINTENANCE LETTER (OUTPATIENT)
Age: 72
End: 2022-03-12

## 2022-06-01 NOTE — PROGRESS NOTES
INITIAL NEUROLOGY CONSULTATION    DATE OF VISIT: 6/2/2022  CLINIC LOCATION: [facility]  MRN: [id number]  PATIENT NAME: Bill Smith  YOB: 1950    REASON FOR VISIT:   Chief Complaint   Patient presents with     Tremors     Tremor hands bilateral, improves when he holds hands out flat with palm facing the floor      HISTORY OF PRESENT ILLNESS:                                                    Mr. Bill Smith is 71 year old right handed male patient with past medical history of gout, parathyroid adenoma, anxiety, hypertension, and diabetes, who was seen today for tremor.    Per patient's report, he developed gradually worsening positional and kinetic hand tremor more than a decade ago.  Over the last 2 years it got noticeably worse affecting his daily activities, such as drinking, eating, and writing.  He feels that both of his hands are affected, but the left hand is noticeably worse.  He denies any resting tremor, head or leg tremor.  No anosmia, bradykinesia, acting out in sleep, or recent falls.  He reports postural instability due to his knee injury after a fall approximately 1 year ago when he tore his meniscus.  Otherwise, the patient denies any additional focal neurological symptoms or history of previous head injuries, seizures, strokes, or CNS infections.  He is on carvedilol for blood pressure reasons, but did not notice any effect on his tremor.  No other treatments tried.    Laboratory evaluation from October 2021 includes elevated hemoglobin A1C of 6.6, normal LDL (49) and TSH (0.96).    No prior brain imaging.    No additional useful information is available in Care Everywhere, which was reviewed.  PAST MEDICAL/SURGICAL HISTORY:                                                    I personally reviewed patient's past medical and surgical history with the patient at today's visit.  MEDICATIONS:                                                    I personally  "reviewed patient's medications and allergies with the patient at today's visit.  ALLERGIES:                                                      Allergies   Allergen Reactions     Bermuda Grass Other (See Comments)     Coughing sneezing     Lisinopril Cough     Molds & Smuts Other (See Comments)     Coughing sneezing  Coughing sneezing       Shellfish Allergy Other (See Comments)     Causes gout     EXAM:                                                    VITAL SIGNS:   /81 (BP Location: Left arm, Patient Position: Sitting, Cuff Size: Adult Large)   Pulse 67   Ht 1.842 m (6' 0.5\")   Wt 110.5 kg (243 lb 9.6 oz)   SpO2 100%   BMI 32.58 kg/m    Mini-Cog Assessment:  Mini Cog Assessment  Clock Draw Score: 2 Normal  3 Item Recall: 2 objects recalled  Mini Cog Total Score: 4  Administered by: : Darling JAMES    General: pt is in NAD, cooperative.  Skin: normal turgor, moist mucous membranes, no lesions/rashes noticed.  HEENT: ATNC, EOMI, PERRL, white sclera, normal conjunctiva, no nystagmus or ptosis. No carotid bruits bilaterally.  Respiratory: lung sounds clear to auscultation bilaterally, no crackles, wheezes, rhonchi. Symmetric lung excursion, no accessory respiratory muscle use.  Cardiovascular: normal S1/S2, no murmurs/rubs/gallops.   Abdomen: Not distended.  : deferred.    Neurological:  Mental: alert, follows commands, Mini Cog Total Score: 4/5 with 2/3 on memory recall, no aphasia or dysarthria. Fund of knowledge is appropriate for age.  Cranial Nerves:  CN II: visual acuity - able to accurately count fingers with each eye. Visual fields intact, fundi: discs sharp, no papilledema and normal vessels bilaterally.  CN III, IV, VI: EOM intact, pupils equal and reactive  CN V: facial sensation nl  CN VII: face symmetric, no facial droop  CN VIII: hearing normal  CN IX: palate elevation symmetric, uvula at midline  CN XI SCM normal, shoulder shrug nl  CN XII: tongue midline  Motor: Strength: 5/5 in all major " groups of all extremities. Normal tone, even with provoking maneuvers.  The patient has mild right and moderate left hand positional and kinetic tremor.  No resting tremor.  He completed tremor spiral worksheet that will be scanned into his medical record.  No other abnormal movements. No pronator drift b/l.  Reflexes: Triceps, biceps, brachioradialis, and patellar reflexes normal and symmetric, achilles reflexes are absent bilaterally. No clonus noted. Toes are down-going b/l.   Sensory: light touch, pinprick, and vibration are reduced in both feet. Romberg: negative.  Coordination: FNF and heel-shin tests intact b/l.   Gait: Antalgic gait, uses a cane due to knee pain, has difficulty with tandem walk.  DATA:   LABS/EEG/IMAGING/OTHER STUDIES: I reviewed pertinent medical records, as detailed in the history of present illness.  ASSESSMENT AND PLAN:      ASSESSMENT: Bill Smith is a 71 year old male patient with listed above past medical history, who presents with tremor that started more than 10 years ago and gradually progressed over time.    We had a detailed discussion with the patient regarding his presenting complaints.  The neurological exam today is noticeable for bilateral asymmetric (left worse than right) postural and kinetic hand tremor that would be consistent with essential tremor.  I do not see any extrapyramidal signs to suspect Parkinson's disease.  He also has incidental findings compatible with mild peripheral polyneuropathy, likely related to his diabetes.    We reviewed suspected diagnosis, available treatment options, and the plan, as summarized below.  Given the absence of response to carvedilol, we decided to try primidone. Side effects were discussed.  They include, but not limited to reduced mental alertness and coordination, dizziness, drowsiness, ataxia, nausea, anorexia, vomiting, depression worsening, suicidal ideations, ans unusual changes in behavior.  It could also potentiate the  effects of alcohol (advised the patient to avoid it).  He is willing to proceed.  I counseled the patient to contact my clinic with any intolerable side effects.  We also discussed potential interaction with trazodone that he is planning to stop and hypotensive effect if it is used in combination with his antihypertensives.  The patient was recommended to monitor his blood pressure closely.      DIAGNOSES:    ICD-10-CM    1. Essential tremor  G25.0 Adult Neurology Referral     PLAN: At today's visit we thoroughly discussed current symptoms, available treatment options, and the plan.     We decided to add primidone.  Advised the patient to take half a tablet at bedtime for 1 week, then increase to 1 tablet at bedtime for 1 week.  Take half a tablet in the morning and 1 tablet in the evening for week #3.  After that, increase further to 1 tablet twice daily.  He will contact my clinic with any intolerable side effects.    We also discussed utility of wrist weights and other strategies to help his tremor.  We talked about occupational therapy referral and Caridad Trio device.    Next follow-up appointment is in the next 3 months or earlier if needed.    Total Time: 61 minutes spent on the date of the encounter doing chart review, history and exam, documentation and further activities per the note.    Guillermo Rivera MD  [facility]rology  (Chart documentation was completed in part with Dragon voice-recognition software. Even though reviewed, some grammatical, spelling, and word errors may remain.)

## 2022-06-02 ENCOUNTER — OFFICE VISIT (OUTPATIENT)
Dept: NEUROLOGY | Facility: CLINIC | Age: 72
End: 2022-06-02
Attending: FAMILY MEDICINE
Payer: MEDICARE

## 2022-06-02 VITALS
SYSTOLIC BLOOD PRESSURE: 131 MMHG | HEIGHT: 73 IN | DIASTOLIC BLOOD PRESSURE: 81 MMHG | HEART RATE: 67 BPM | BODY MASS INDEX: 32.29 KG/M2 | WEIGHT: 243.6 LBS | OXYGEN SATURATION: 100 %

## 2022-06-02 DIAGNOSIS — G25.0 ESSENTIAL TREMOR: Primary | ICD-10-CM

## 2022-06-02 PROCEDURE — 99205 OFFICE O/P NEW HI 60 MIN: CPT | Performed by: PSYCHIATRY & NEUROLOGY

## 2022-06-02 RX ORDER — PRIMIDONE 50 MG/1
TABLET ORAL
Qty: 60 TABLET | Refills: 2 | Status: SHIPPED | OUTPATIENT
Start: 2022-06-02 | End: 2022-06-02

## 2022-06-02 RX ORDER — VITAMIN E 268 MG
CAPSULE ORAL DAILY
COMMUNITY
End: 2022-10-04

## 2022-06-02 RX ORDER — ERGOCALCIFEROL (VITAMIN D2) 10 MCG
400 TABLET ORAL DAILY
COMMUNITY
End: 2023-08-02

## 2022-06-02 RX ORDER — PRIMIDONE 50 MG/1
TABLET ORAL
Qty: 60 TABLET | Refills: 2 | Status: SHIPPED | OUTPATIENT
Start: 2022-06-02 | End: 2022-08-29

## 2022-06-02 NOTE — PATIENT INSTRUCTIONS
AFTER VISIT SUMMARY (AVS):    At today's visit we thoroughly discussed current symptoms, available treatment options, and the plan.     We decided to add primidone.  Please take half a tablet at bedtime for 1 week, then increase to 1 tablet at bedtime for 1 week.  Take half a tablet in the morning and 1 tablet in the evening for week #3.  After that increase further to 1 tablet twice daily.  Please contact my clinic with any intolerable side effects.    We also discussed utility of wrist weights and other strategies to help your tremor.  We talked about occupational therapy referral and Caridad Trio device.    Next follow-up appointment is in the next 3 months or earlier if needed.    Please do not hesitate to call me with any questions or concerns.    Thanks.

## 2022-06-02 NOTE — LETTER
6/2/2022         RE: Bill Smith  [address]   [place] [address]        Dear Colleague,    Thank you for referring your patient, Bill Smith, to the [facility] NEUROLOGY CLINICS [facility]. Please see a copy of my visit note below.    INITIAL NEUROLOGY CONSULTATION    DATE OF VISIT: 6/2/2022  CLINIC LOCATION: [facility]  MRN: [id number]  PATIENT NAME: Bill Smith  YOB: 1950    REASON FOR VISIT:   Chief Complaint   Patient presents with     Tremors     Tremor hands bilateral, improves when he holds hands out flat with palm facing the floor      HISTORY OF PRESENT ILLNESS:                                                    Mr. Bill Smith is 71 year old right handed male patient with past medical history of gout, parathyroid adenoma, anxiety, hypertension, and diabetes, who was seen today for tremor.    Per patient's report, he developed gradually worsening positional and kinetic hand tremor more than a decade ago.  Over the last 2 years it got noticeably worse affecting his daily activities, such as drinking, eating, and writing.  He feels that both of his hands are affected, but the left hand is noticeably worse.  He denies any resting tremor, head or leg tremor.  No anosmia, bradykinesia, acting out in sleep, or recent falls.  He reports postural instability due to his knee injury after a fall approximately 1 year ago when he tore his meniscus.  Otherwise, the patient denies any additional focal neurological symptoms or history of previous head injuries, seizures, strokes, or CNS infections.  He is on carvedilol for blood pressure reasons, but did not notice any effect on his tremor.  No other treatments tried.    Laboratory evaluation from October 2021 includes elevated hemoglobin A1C of 6.6, normal LDL (49) and TSH (0.96).    No prior brain imaging.    No additional useful information is available in Care Everywhere, which was  "reviewed.  PAST MEDICAL/SURGICAL HISTORY:                                                    I personally reviewed patient's past medical and surgical history with the patient at today's visit.  MEDICATIONS:                                                    I personally reviewed patient's medications and allergies with the patient at today's visit.  ALLERGIES:                                                      Allergies   Allergen Reactions     Bermuda Grass Other (See Comments)     Coughing sneezing     Lisinopril Cough     Molds & Smuts Other (See Comments)     Coughing sneezing  Coughing sneezing       Shellfish Allergy Other (See Comments)     Causes gout     EXAM:                                                    VITAL SIGNS:   /81 (BP Location: Left arm, Patient Position: Sitting, Cuff Size: Adult Large)   Pulse 67   Ht 1.842 m (6' 0.5\")   Wt 110.5 kg (243 lb 9.6 oz)   SpO2 100%   BMI 32.58 kg/m    Mini-Cog Assessment:  Mini Cog Assessment  Clock Draw Score: 2 Normal  3 Item Recall: 2 objects recalled  Mini Cog Total Score: 4  Administered by: : Darling JAMES    General: pt is in NAD, cooperative.  Skin: normal turgor, moist mucous membranes, no lesions/rashes noticed.  HEENT: ATNC, EOMI, PERRL, white sclera, normal conjunctiva, no nystagmus or ptosis. No carotid bruits bilaterally.  Respiratory: lung sounds clear to auscultation bilaterally, no crackles, wheezes, rhonchi. Symmetric lung excursion, no accessory respiratory muscle use.  Cardiovascular: normal S1/S2, no murmurs/rubs/gallops.   Abdomen: Not distended.  : deferred.    Neurological:  Mental: alert, follows commands, Mini Cog Total Score: 4/5 with 2/3 on memory recall, no aphasia or dysarthria. Fund of knowledge is appropriate for age.  Cranial Nerves:  CN II: visual acuity - able to accurately count fingers with each eye. Visual fields intact, fundi: discs sharp, no papilledema and normal vessels bilaterally.  CN III, IV, VI: EOM intact, " pupils equal and reactive  CN V: facial sensation nl  CN VII: face symmetric, no facial droop  CN VIII: hearing normal  CN IX: palate elevation symmetric, uvula at midline  CN XI SCM normal, shoulder shrug nl  CN XII: tongue midline  Motor: Strength: 5/5 in all major groups of all extremities. Normal tone, even with provoking maneuvers.  The patient has mild right and moderate left hand positional and kinetic tremor.  No resting tremor.  He completed tremor spiral worksheet that will be scanned into his medical record.  No other abnormal movements. No pronator drift b/l.  Reflexes: Triceps, biceps, brachioradialis, and patellar reflexes normal and symmetric, achilles reflexes are absent bilaterally. No clonus noted. Toes are down-going b/l.   Sensory: light touch, pinprick, and vibration are reduced in both feet. Romberg: negative.  Coordination: FNF and heel-shin tests intact b/l.   Gait: Antalgic gait, uses a cane due to knee pain, has difficulty with tandem walk.  DATA:   LABS/EEG/IMAGING/OTHER STUDIES: I reviewed pertinent medical records, as detailed in the history of present illness.  ASSESSMENT AND PLAN:      ASSESSMENT: Bill Smith is a 71 year old male patient with listed above past medical history, who presents with tremor that started more than 10 years ago and gradually progressed over time.    We had a detailed discussion with the patient regarding his presenting complaints.  The neurological exam today is noticeable for bilateral asymmetric (left worse than right) postural and kinetic hand tremor that would be consistent with essential tremor.  I do not see any extrapyramidal signs to suspect Parkinson's disease.  He also has incidental findings compatible with mild peripheral polyneuropathy, likely related to his diabetes.    We reviewed suspected diagnosis, available treatment options, and the plan, as summarized below.  Given the absence of response to carvedilol, we decided to try primidone.  Side effects were discussed.  They include, but not limited to reduced mental alertness and coordination, dizziness, drowsiness, ataxia, nausea, anorexia, vomiting, depression worsening, suicidal ideations, ans unusual changes in behavior.  It could also potentiate the effects of alcohol (advised the patient to avoid it).  He is willing to proceed.  I counseled the patient to contact my clinic with any intolerable side effects.  We also discussed potential interaction with trazodone that he is planning to stop and hypotensive effect if it is used in combination with his antihypertensives.  The patient was recommended to monitor his blood pressure closely.      DIAGNOSES:    ICD-10-CM    1. Essential tremor  G25.0 Adult Neurology Referral     PLAN: At today's visit we thoroughly discussed current symptoms, available treatment options, and the plan.     We decided to add primidone.  Advised the patient to take half a tablet at bedtime for 1 week, then increase to 1 tablet at bedtime for 1 week.  Take half a tablet in the morning and 1 tablet in the evening for week #3.  After that, increase further to 1 tablet twice daily.  He will contact my clinic with any intolerable side effects.    We also discussed utility of wrist weights and other strategies to help his tremor.  We talked about occupational therapy referral and Caridad Trio device.    Next follow-up appointment is in the next 3 months or earlier if needed.    Total Time: 61 minutes spent on the date of the encounter doing chart review, history and exam, documentation and further activities per the note.    Guillermo Rivera MD  [facility] Neurology  (Chart documentation was completed in part with Dragon voice-recognition software. Even though reviewed, some grammatical, spelling, and word errors may remain.)            Bill Smith is a 71 year old male who presents for:  Chief Complaint   Patient presents with     Tremors     Tremor hands  "bilateral, improves when he holds hands out flat with palm facing the floor         Initial Vitals:  /81 (BP Location: Left arm, Patient Position: Sitting, Cuff Size: Adult Large)   Pulse 67   Ht 1.842 m (6' 0.5\")   Wt 110.5 kg (243 lb 9.6 oz)   SpO2 100%   BMI 32.58 kg/m   Estimated body mass index is 32.58 kg/m  as calculated from the following:    Height as of this encounter: 1.842 m (6' 0.5\").    Weight as of this encounter: 110.5 kg (243 lb 9.6 oz).. Body surface area is 2.38 meters squared. BP completed using cuff size: large    Nursing Comments: Tremor sheet completed     Darling Garrido      Again, thank you for allowing me to participate in the care of your patient.        Sincerely,        Guillermo Rivera MD    "

## 2022-06-02 NOTE — PROGRESS NOTES
"Bill Smith is a 71 year old male who presents for:  Chief Complaint   Patient presents with     Tremors     Tremor hands bilateral, improves when he holds hands out flat with palm facing the floor         Initial Vitals:  /81 (BP Location: Left arm, Patient Position: Sitting, Cuff Size: Adult Large)   Pulse 67   Ht 1.842 m (6' 0.5\")   Wt 110.5 kg (243 lb 9.6 oz)   SpO2 100%   BMI 32.58 kg/m   Estimated body mass index is 32.58 kg/m  as calculated from the following:    Height as of this encounter: 1.842 m (6' 0.5\").    Weight as of this encounter: 110.5 kg (243 lb 9.6 oz).. Body surface area is 2.38 meters squared. BP completed using cuff size: large    Nursing Comments: Tremor sheet completed     Darling Garrido  "

## 2022-07-02 ENCOUNTER — HEALTH MAINTENANCE LETTER (OUTPATIENT)
Age: 72
End: 2022-07-02

## 2022-07-13 DIAGNOSIS — E11.9 TYPE 2 DIABETES MELLITUS WITHOUT COMPLICATION, WITHOUT LONG-TERM CURRENT USE OF INSULIN (H): ICD-10-CM

## 2022-07-14 NOTE — TELEPHONE ENCOUNTER
"Requested Prescriptions   Pending Prescriptions Disp Refills    metFORMIN (GLUCOPHAGE) 500 MG tablet [Pharmacy Med Name: metFORMIN HCl 500 MG Oral Tablet] 180 tablet 0     Sig: TAKE 1 TABLET (500MG) BY MOUTH TWICE DAILY WITH MEALS FOR DIABETES        Biguanide Agents Failed - 7/13/2022  3:00 PM        Failed - Patient has documented A1c within the specified period of time.     If HgbA1C is 8 or greater, it needs to be on file within the past 3 months.  If less than 8, must be on file within the past 6 months.     Recent Labs   Lab Test 10/26/21  1259   A1C 6.6*             Failed - Patient's CR is NOT>1.4 OR Patient's EGFR is NOT<45 within past 12 mos.       Recent Labs   Lab Test 06/17/21  1746   GFRESTIMATED 90   GFRESTBLACK >90       Recent Labs   Lab Test 06/17/21  1746   CR 0.78             Failed - Recent (6 mo) or future (30 days) visit within the authorizing provider's specialty     Patient had office visit in the last 6 months or has a visit in the next 30 days with authorizing provider or within the authorizing provider's specialty.  See \"Patient Info\" tab in inbasket, or \"Choose Columns\" in Meds & Orders section of the refill encounter.            Passed - Patient is age 10 or older        Passed - Patient does NOT have a diagnosis of CHF.        Passed - Medication is active on med list              "

## 2022-07-20 ENCOUNTER — MYC MEDICAL ADVICE (OUTPATIENT)
Dept: FAMILY MEDICINE | Facility: CLINIC | Age: 72
End: 2022-07-20

## 2022-07-28 DIAGNOSIS — I10 ESSENTIAL HYPERTENSION WITH GOAL BLOOD PRESSURE LESS THAN 140/90: ICD-10-CM

## 2022-07-29 RX ORDER — LOSARTAN POTASSIUM 100 MG/1
TABLET ORAL
Qty: 90 TABLET | Refills: 0 | Status: SHIPPED | OUTPATIENT
Start: 2022-07-29 | End: 2022-10-04

## 2022-08-29 ENCOUNTER — OFFICE VISIT (OUTPATIENT)
Dept: NEUROLOGY | Facility: CLINIC | Age: 72
End: 2022-08-29
Payer: MEDICARE

## 2022-08-29 VITALS
BODY MASS INDEX: 32.78 KG/M2 | SYSTOLIC BLOOD PRESSURE: 131 MMHG | WEIGHT: 242 LBS | HEIGHT: 72 IN | DIASTOLIC BLOOD PRESSURE: 81 MMHG | OXYGEN SATURATION: 96 % | HEART RATE: 73 BPM

## 2022-08-29 DIAGNOSIS — G25.0 ESSENTIAL TREMOR: Primary | ICD-10-CM

## 2022-08-29 DIAGNOSIS — G57.31 PERONEAL NEUROPATHY, RIGHT: ICD-10-CM

## 2022-08-29 PROCEDURE — 99214 OFFICE O/P EST MOD 30 MIN: CPT | Performed by: PSYCHIATRY & NEUROLOGY

## 2022-08-29 RX ORDER — PRIMIDONE 50 MG/1
TABLET ORAL
Qty: 120 TABLET | Refills: 2 | Status: SHIPPED | OUTPATIENT
Start: 2022-08-29 | End: 2023-03-21

## 2022-08-29 NOTE — LETTER
8/29/2022         RE: Bill Smith  [address] [facility]  [place] [address]        Dear Colleague,    Thank you for referring your patient, Bill Smith, to the [facility] NEUROLOGY CLINICS [facility]. Please see a copy of my visit note below.    ESTABLISHED PATIENT NEUROLOGY NOTE    DATE OF VISIT: 8/29/2022  CLINIC LOCATION: [facility]  MRN: [id number]  PATIENT NAME: Bill Smith  YOB: 1950    REASON FOR VISIT:   Chief Complaint   Patient presents with     Follow Up     Tremor- was doing good first couple of weeks and have now been the same from when he first noticed the tremor      Tingling     Right side of leg      SUBJECTIVE:                                                      HISTORY OF PRESENT ILLNESS: Patient is here to follow up regarding essential tremor.  He also reports a new problem, right leg paresthesias, that he would like to discuss.  Please refer to my initial note from 6/2/2022 for further information.    Since the last visit, the patient reports initial transient tremor improvement, but now feels that primidone is not quite helpful at 50 mg twice daily.  No significant side effects.      In addition, the patient reports numbness and tingling on the lateral surface of his right leg below the knee that extends to his foot.  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  He also frequently crosses his legs to let his dog to sit on him.  He denies other new focal neurological symptoms.  He usually spends winter in [place] (leaves in early November).  Usually returns in May.  EXAM:                                                    Physical Exam:   Vitals: /81   Pulse 73   Ht 1.829 m (6')   Wt 109.8 kg (242 lb)   SpO2 96%   BMI 32.82 kg/m      General: pt is in NAD, cooperative.  Skin: normal turgor, moist mucous membranes, no  lesions/rashes noticed.  HEENT: ATNC, white sclera, normal conjunctiva.  Respiratory: Symmetric lung excursion, no accessory respiratory muscle use.  Abdomen: Non distended.  Neurological: awake, cooperative, follows commands, no exam changes compared to the initial visit.  ASSESSMENT AND PLAN:                                                    Assessment: 71 year old male patient presents for follow-up of essential tremor after starting primidone, which was somewhat helpful temporarily, but he feels that his symptoms worsened again.  I discussed with the patient that in my opinion he needs increased dose of primidone to control his symptoms.  We will increase it further.  Updated prescription was sent to his pharmacy.    Regarding his right leg paresthesia, most likely it is due to peroneal neuropathy that is caused by compression (combination of crossing his legs and wearing his brace too tight).  We discussed that first interventions would be to avoid crossing of his legs and assure that the brace is not placed too tight.  If symptoms continue or worsen, might consider EMG and additional treatment options.    Diagnoses:    ICD-10-CM    1. Essential tremor  G25.0 primidone (MYSOLINE) 50 MG tablet   2. Peroneal neuropathy, right  G57.31      Plan: At today's visit we thoroughly discussed current symptoms, diagnosis, available treatment options, and the plan.    For his tremor, we decided to further increase the dose of primidone.  I advised the patient to take 1 tablet (50 mg) every morning and 2 tablets (100 mg) every evening for the next 4 weeks.  If no improvement after that, he will further increase the dose to 2 tablets (100 mg) twice per day and contact my clinic with any intolerable side effects.    For right leg numbness and tingling, we discussed that he should avoid crossing his legs and also do not put leg brace too tight to avoid the compression of peroneal nerve.  If symptoms persist or worsen, we  might consider doing EMG.    Next follow-up appointment is in the next 3 months or earlier if needed.    Total Time: 31 minutes spent on the date of the encounter doing chart review, history and exam, documentation and further activities per the note.    Guillermo Rivera MD  [facility] Neurology  (Chart documentation was completed in part with Dragon voice-recognition software. Even though reviewed, some grammatical, spelling, and word errors may remain.)        Again, thank you for allowing me to participate in the care of your patient.        Sincerely,        Guillermo Rivera MD

## 2022-08-29 NOTE — PATIENT INSTRUCTIONS
AFTER VISIT SUMMARY (AVS):    At today's visit we thoroughly discussed current symptoms, diagnosis, available treatment options, and the plan.    For your tremor, we decided to further increase the dose of primidone.  Please take 1 tablet (50 mg) every morning and 2 tablets (100 mg) every evening for the next 4 weeks.  If no improvement after that, please increase the dose to 2 tablets (100 mg) twice per day.  Please contact my clinic with any intolerable side effects.    For your right leg numbness and tingling, please avoid crossing your legs and also do not put leg brace too tight to avoid the compression of peroneal nerve.  If symptoms persist or worsen, we might consider doing EMG.    Next follow-up appointment is in the next 3 months or earlier if needed.    Please do not hesitate to call me with any questions or concerns.    Thanks.

## 2022-08-30 ENCOUNTER — OFFICE VISIT (OUTPATIENT)
Dept: ORTHOPEDICS | Facility: CLINIC | Age: 72
End: 2022-08-30
Payer: MEDICARE

## 2022-08-30 DIAGNOSIS — M17.11 PRIMARY OSTEOARTHRITIS OF RIGHT KNEE: Primary | ICD-10-CM

## 2022-08-30 DIAGNOSIS — M17.12 PRIMARY OSTEOARTHRITIS OF LEFT KNEE: ICD-10-CM

## 2022-08-30 PROCEDURE — 20610 DRAIN/INJ JOINT/BURSA W/O US: CPT | Mod: 50 | Performed by: FAMILY MEDICINE

## 2022-08-30 RX ORDER — TRIAMCINOLONE ACETONIDE 40 MG/ML
40 INJECTION, SUSPENSION INTRA-ARTICULAR; INTRAMUSCULAR
Status: DISCONTINUED | OUTPATIENT
Start: 2022-08-30 | End: 2023-08-11

## 2022-08-30 RX ADMIN — TRIAMCINOLONE ACETONIDE 40 MG: 40 INJECTION, SUSPENSION INTRA-ARTICULAR; INTRAMUSCULAR at 11:18

## 2022-08-30 NOTE — NURSING NOTE
Chief Complaint   Patient presents with     Left Knee - Pain, Follow Up     Right Knee - Pain, Follow Up       There were no vitals filed for this visit.    There is no height or weight on file to calculate BMI.      NELIDA Sharp NREMT

## 2022-08-30 NOTE — PROGRESS NOTES
Large Joint Injection/Arthocentesis: bilateral knee    Date/Time: 8/30/2022 11:18 AM  Performed by: Patrick Patel DO  Authorized by: Patrick Patel DO     Indications:  Pain  Needle Size:  22 G  Guidance: landmark guided    Approach:  Lateral  Location:  Knee  Laterality:  Bilateral      Medications (Right):  40 mg triamcinolone 40 MG/ML  Medications (Left):  40 mg triamcinolone 40 MG/ML  Outcome:  Tolerated well, no immediate complications  Procedure discussed: discussed risks, benefits, and alternatives    Consent Given by:  Patient  Timeout: timeout called immediately prior to procedure    Prep: patient was prepped and draped in usual sterile fashion         PROCEDURE    Knee Injections - Intraarticular    The patient was informed of the risks and the benefits of the procedure and a written consent was signed.    The patient s left knee was prepped with chlorhexidine in sterile fashion.   40 mg of triamcinolone suspension was drawn up into a 5 mL syringe with 4 mL of 1% lidocaine.  Injection was performed using substerile technique.  A 1.5-inch 22-gauge needle was used to enter the lateral aspect of the left knee.  Injection performed successfully without difficulty.  There were no complications. The patient tolerated the procedure well. There was negligible bleeding.     The patient's right knee was prepped with chlorhexidine in sterile fashion.   40 mg of triamcinolone suspension was drawn up into a 5 mL syringe with 4 mL of 1% lidocaine.  Injection was performed using substerile technique.  A 1.5-inch 22-gauge needle was used to enter the lateral aspect of the right knee.  Injection performed successfully without difficulty.  There were no complications. The patient tolerated the procedure well. There was negligible bleeding.             Alex has bilateral knee osteoarthritis.  He is here for repeat bilateral knee injections.

## 2022-08-30 NOTE — NURSING NOTE
[facility]   ORTHOPEDICS & SPORTS MEDICINE  [address] [place] [address]  Dept: [phone]  ______________________________________________________________________________    Patient: Bill Smith   : 1950   MRN: [id number]   2022    INVASIVE PROCEDURE SAFETY CHECKLIST    Date: 2022   Procedure:Bilateral knee injection   Patient Name: Bill Smith  MRN: [id number]  YOB: 1950    Action: Complete sections as appropriate. Any discrepancy results in a HARD COPY until resolved.     PRE PROCEDURE:  Patient ID verified with 2 identifiers (name and  or MRN): Yes  Procedure and site verified with patient/designee (when able): Yes  Accurate consent documentation in medical record: Yes  H&P (or appropriate assessment) documented in medical record: Yes  H&P must be up to 20 days prior to procedure and updates within 24 hours of procedure as applicable: NA  Relevant diagnostic and radiology test results appropriately labeled and displayed as applicable: NA  Procedure site(s) marked with provider initials: NA    TIMEOUT:  Time-Out performed immediately prior to starting procedure, including verbal and active participation of all team members addressing the following:Yes  * Correct patient identify  * Confirmed that the correct side and site are marked  * An accurate procedure consent form  * Agreement on the procedure to be done  * Correct patient position  * Relevant images and results are properly labeled and appropriately displayed  * The need to administer antibiotics or fluids for irrigation purposes during the procedure as applicable   * Safety precautions based on patient history or medication use    DURING PROCEDURE: Verification of correct person, site, and procedures any time the responsibility for care of the patient is transferred to another member of the care team.       Prior to injection, verified patient identity using patient's name and date  of birth.  Due to injection administration, patient instructed to remain in clinic for 15 minutes  afterwards, and to report any adverse reaction to me immediately.    Joint injection was performed.      Drug Amount Wasted:  None.  Vial/Syringe: Single dose vial  Expiration Date:  5/31/2024      Aron Welsh, EMT  August 30, 2022

## 2022-08-30 NOTE — LETTER
8/30/2022         RE: Bill Smith  [address] [place] [address]  [place] [address]        Dear Colleague,    Thank you for referring your patient, Bill Smith, to the [facility] [place]. Please see a copy of my visit note below.    Large Joint Injection/Arthocentesis: bilateral knee    Date/Time: 8/30/2022 11:18 AM  Performed by: Patrick Patel DO  Authorized by: Patrick Patel DO     Indications:  Pain  Needle Size:  22 G  Guidance: landmark guided    Approach:  Lateral  Location:  Knee  Laterality:  Bilateral      Medications (Right):  40 mg triamcinolone 40 MG/ML  Medications (Left):  40 mg triamcinolone 40 MG/ML  Outcome:  Tolerated well, no immediate complications  Procedure discussed: discussed risks, benefits, and alternatives    Consent Given by:  Patient  Timeout: timeout called immediately prior to procedure    Prep: patient was prepped and draped in usual sterile fashion         PROCEDURE    Knee Injections - Intraarticular    The patient was informed of the risks and the benefits of the procedure and a written consent was signed.    The patient s left knee was prepped with chlorhexidine in sterile fashion.   40 mg of triamcinolone suspension was drawn up into a 5 mL syringe with 4 mL of 1% lidocaine.  Injection was performed using substerile technique.  A 1.5-inch 22-gauge needle was used to enter the lateral aspect of the left knee.  Injection performed successfully without difficulty.  There were no complications. The patient tolerated the procedure well. There was negligible bleeding.     The patient's right knee was prepped with chlorhexidine in sterile fashion.   40 mg of triamcinolone suspension was drawn up into a 5 mL syringe with 4 mL of 1% lidocaine.  Injection was performed using substerile technique.  A 1.5-inch 22-gauge needle was used to enter the lateral aspect of the right knee.  Injection performed successfully without difficulty.  There  were no complications. The patient tolerated the procedure well. There was negligible bleeding.             Alex has bilateral knee osteoarthritis.  He is here for repeat bilateral knee injections.      Again, thank you for allowing me to participate in the care of your patient.        Sincerely,        Patrick Patel, DO

## 2022-09-27 ENCOUNTER — TRANSFERRED RECORDS (OUTPATIENT)
Dept: HEALTH INFORMATION MANAGEMENT | Facility: CLINIC | Age: 72
End: 2022-09-27

## 2022-09-27 LAB — RETINOPATHY: NEGATIVE

## 2022-10-04 ENCOUNTER — OFFICE VISIT (OUTPATIENT)
Dept: FAMILY MEDICINE | Facility: CLINIC | Age: 72
End: 2022-10-04
Payer: MEDICARE

## 2022-10-04 VITALS
HEIGHT: 72 IN | DIASTOLIC BLOOD PRESSURE: 72 MMHG | HEART RATE: 79 BPM | OXYGEN SATURATION: 96 % | SYSTOLIC BLOOD PRESSURE: 134 MMHG | WEIGHT: 242.4 LBS | TEMPERATURE: 98.1 F | RESPIRATION RATE: 15 BRPM | BODY MASS INDEX: 32.83 KG/M2

## 2022-10-04 DIAGNOSIS — Z12.5 SCREENING FOR PROSTATE CANCER: ICD-10-CM

## 2022-10-04 DIAGNOSIS — I10 ESSENTIAL HYPERTENSION WITH GOAL BLOOD PRESSURE LESS THAN 140/90: ICD-10-CM

## 2022-10-04 DIAGNOSIS — M1A.00X0 IDIOPATHIC CHRONIC GOUT WITHOUT TOPHUS, UNSPECIFIED SITE: ICD-10-CM

## 2022-10-04 DIAGNOSIS — Z12.11 SCREEN FOR COLON CANCER: ICD-10-CM

## 2022-10-04 DIAGNOSIS — Z00.00 ENCOUNTER FOR MEDICARE ANNUAL WELLNESS EXAM: Primary | ICD-10-CM

## 2022-10-04 DIAGNOSIS — E11.9 TYPE 2 DIABETES MELLITUS WITHOUT COMPLICATION, WITHOUT LONG-TERM CURRENT USE OF INSULIN (H): ICD-10-CM

## 2022-10-04 DIAGNOSIS — Z23 ENCOUNTER FOR IMMUNIZATION: ICD-10-CM

## 2022-10-04 DIAGNOSIS — Z13.6 CARDIOVASCULAR SCREENING; LDL GOAL LESS THAN 100: ICD-10-CM

## 2022-10-04 PROCEDURE — 0134A COVID-19,PF,MODERNA BIVALENT: CPT | Performed by: FAMILY MEDICINE

## 2022-10-04 PROCEDURE — 99214 OFFICE O/P EST MOD 30 MIN: CPT | Mod: 25 | Performed by: FAMILY MEDICINE

## 2022-10-04 PROCEDURE — G0009 ADMIN PNEUMOCOCCAL VACCINE: HCPCS | Performed by: FAMILY MEDICINE

## 2022-10-04 PROCEDURE — G0008 ADMIN INFLUENZA VIRUS VAC: HCPCS | Performed by: FAMILY MEDICINE

## 2022-10-04 PROCEDURE — G0439 PPPS, SUBSEQ VISIT: HCPCS | Performed by: FAMILY MEDICINE

## 2022-10-04 PROCEDURE — 90677 PCV20 VACCINE IM: CPT | Performed by: FAMILY MEDICINE

## 2022-10-04 PROCEDURE — 90662 IIV NO PRSV INCREASED AG IM: CPT | Performed by: FAMILY MEDICINE

## 2022-10-04 PROCEDURE — 91313 COVID-19,PF,MODERNA BIVALENT: CPT | Performed by: FAMILY MEDICINE

## 2022-10-04 RX ORDER — HYDRALAZINE HYDROCHLORIDE 50 MG/1
TABLET, FILM COATED ORAL
Qty: 270 TABLET | Refills: 3 | Status: SHIPPED | OUTPATIENT
Start: 2022-10-04 | End: 2023-10-06

## 2022-10-04 RX ORDER — LOSARTAN POTASSIUM 100 MG/1
TABLET ORAL
Qty: 90 TABLET | Refills: 3 | Status: SHIPPED | OUTPATIENT
Start: 2022-10-04 | End: 2023-10-06

## 2022-10-04 RX ORDER — CARVEDILOL 25 MG/1
TABLET ORAL
Qty: 180 TABLET | Refills: 3 | Status: SHIPPED | OUTPATIENT
Start: 2022-10-04 | End: 2023-10-06

## 2022-10-04 RX ORDER — ALLOPURINOL 100 MG/1
TABLET ORAL
Qty: 180 TABLET | Refills: 3 | Status: SHIPPED | OUTPATIENT
Start: 2022-10-04 | End: 2023-10-06

## 2022-10-04 RX ORDER — CHLORTHALIDONE 25 MG/1
25 TABLET ORAL EVERY MORNING
Qty: 90 TABLET | Refills: 3 | Status: SHIPPED | OUTPATIENT
Start: 2022-10-04 | End: 2023-10-06

## 2022-10-04 ASSESSMENT — ENCOUNTER SYMPTOMS
FEVER: 0
FREQUENCY: 1
CONSTIPATION: 0
HEMATURIA: 0
DIARRHEA: 0
HEMATOCHEZIA: 0
PALPITATIONS: 0
SHORTNESS OF BREATH: 0
SORE THROAT: 0
NAUSEA: 0
JOINT SWELLING: 1
ABDOMINAL PAIN: 0
NERVOUS/ANXIOUS: 0
DYSURIA: 0
COUGH: 0
MYALGIAS: 1
PARESTHESIAS: 0
ARTHRALGIAS: 1
HEADACHES: 0
WEAKNESS: 0
EYE PAIN: 0
DIZZINESS: 0
CHILLS: 0
HEARTBURN: 0

## 2022-10-04 ASSESSMENT — ACTIVITIES OF DAILY LIVING (ADL): CURRENT_FUNCTION: NO ASSISTANCE NEEDED

## 2022-10-04 ASSESSMENT — PAIN SCALES - GENERAL: PAINLEVEL: SEVERE PAIN (6)

## 2022-10-04 NOTE — NURSING NOTE
Prior to immunization administration, verified patients identity using patient s name and date of birth. Please see Immunization Activity for additional information.     Screening Questionnaire for Adult Immunization    Are you sick today?   No   Do you have allergies to medications, food, a vaccine component or latex?   Yes   Have you ever had a serious reaction after receiving a vaccination?   No   Do you have a long-term health problem with heart, lung, kidney, or metabolic disease (e.g., diabetes), asthma, a blood disorder, no spleen, complement component deficiency, a cochlear implant, or a spinal fluid leak?  Are you on long-term aspirin therapy?   No   Do you have cancer, leukemia, HIV/AIDS, or any other immune system problem?   No   Do you have a parent, brother, or sister with an immune system problem?   No   In the past 3 months, have you taken medications that affect  your immune system, such as prednisone, other steroids, or anticancer drugs; drugs for the treatment of rheumatoid arthritis, Crohn s disease, or psoriasis; or have you had radiation treatments?   No   Have you had a seizure, or a brain or other nervous system problem?   No   During the past year, have you received a transfusion of blood or blood    products, or been given immune (gamma) globulin or antiviral drug?   No   For women: Are you pregnant or is there a chance you could become       pregnant during the next month?   No   Have you received any vaccinations in the past 4 weeks?   No     Immunization questionnaire was positive for at least one answer.  Notified provider aware.        Patient instructed to remain in clinic for 15 minutes afterwards, and to report any adverse reaction to me immediately.       Screening performed by Jasvir Beltran MA on 10/4/2022 at 5:06 PM.

## 2022-10-04 NOTE — PROGRESS NOTES
"SUBJECTIVE:   Bill is a 71 year old who presents for Preventive Visit.      Patient has been advised of split billing requirements and indicates understanding: Yes  Are you in the first 12 months of your Medicare coverage?  No    Healthy Habits:     In general, how would you rate your overall health?  Good    Frequency of exercise:  None    Do you usually eat at least 4 servings of fruit and vegetables a day, include whole grains    & fiber and avoid regularly eating high fat or \"junk\" foods?  No    Taking medications regularly:  Yes    Medication side effects:  None    Ability to successfully perform activities of daily living:  No assistance needed    Home Safety:  No safety concerns identified    Hearing Impairment:  Difficulty following a conversation in a noisy restaurant or crowded room and no hearing concerns    In the past 6 months, have you been bothered by leaking of urine? Yes    In general, how would you rate your overall mental or emotional health?  Good      PHQ-2 Total Score: 0    Additional concerns today:  No    Do you feel safe in your environment? Yes    Have you ever done Advance Care Planning? (For example, a Health Directive, POLST, or a discussion with a medical provider or your loved ones about your wishes): No, advance care planning information given to patient to review.  Patient declined advance care planning discussion at this time.      Fall risk  Fallen 2 or more times in the past year?: Yes  Any fall with injury in the past year?: No    Cognitive Screening   1) Repeat 3 items (Leader, Season, Table)    2) Clock draw: ABNORMAL hands outside of clock  3) 3 item recall: Recalls 3 objects  Results: 3 items recalled: COGNITIVE IMPAIRMENT LESS LIKELY    Mini-CogTM Copyright MARÍA Raza. Licensed by the author for use in [facility]; reprinted with permission (mely@.[facility]). All rights reserved.      Do you have sleep apnea, excessive snoring or daytime drowsiness?: yes    Reviewed " and updated as needed this visit by clinical staff                    Reviewed and updated as needed this visit by Provider                   Social History     Tobacco Use     Smoking status: Never Smoker     Smokeless tobacco: Never Used   Substance Use Topics     Alcohol use: Yes     If you drink alcohol do you typically have >3 drinks per day or >7 drinks per week? No    Alcohol Use 10/4/2022   Prescreen: >3 drinks/day or >7 drinks/week? No       Current providers sharing in care for this patient include:   Patient Care Team:  Nirmal Lubin MD as PCP - General (Family Practice)  Nirmal Lubin MD as Assigned PCP  Jens Riley MD as Assigned Musculoskeletal Provider  Guillermo Rivera MD as Assigned Neuroscience Provider    The following health maintenance items are reviewed in Epic and correct as of today:  Health Maintenance   Topic Date Due     MEDICARE ANNUAL WELLNESS VISIT  08/11/2021     Pneumococcal Vaccine: 65+ Years (3 - PPSV23 or PCV20) 08/11/2021     COVID-19 Vaccine (4 - Booster for Moderna series) 12/18/2021     COLORECTAL CANCER SCREENING  12/29/2021     A1C  01/26/2022     BMP  06/17/2022     MICROALBUMIN  06/17/2022     ANNUAL REVIEW OF HM ORDERS  06/17/2022     INFLUENZA VACCINE (1) 09/01/2022     DIABETIC FOOT EXAM  10/22/2022     LIPID  10/26/2022     DTAP/TDAP/TD IMMUNIZATION (3 - Td or Tdap) 10/31/2022     EYE EXAM  09/27/2023     FALL RISK ASSESSMENT  10/04/2023     ADVANCE CARE PLANNING  08/11/2025     HEPATITIS C SCREENING  Completed     PHQ-2 (once per calendar year)  Completed     ZOSTER IMMUNIZATION  Completed     AORTIC ANEURYSM SCREENING (SYSTEM ASSIGNED)  Completed     IPV IMMUNIZATION  Aged Out     MENINGITIS IMMUNIZATION  Aged Out     Lab work is in process  Labs reviewed in EPIC  BP Readings from Last 3 Encounters:   10/04/22 134/72   08/29/22 131/81   06/02/22 131/81    Wt Readings from Last 3 Encounters:   10/04/22 110 kg (242 lb 6.4 oz)   08/29/22 109.8 kg  (242 lb)   06/02/22 110.5 kg (243 lb 9.6 oz)                  Patient Active Problem List   Diagnosis     History of hernia repair     Parathyroid adenoma     Gout     CARDIOVASCULAR SCREENING; LDL GOAL LESS THAN 130     Insomnia     Anxiety     Advanced directives, counseling/discussion     Essential hypertension with goal blood pressure less than 140/90     Idiopathic chronic gout without tophus, unspecified site     Prediabetes     Complex tear of medial meniscus of right knee as current injury, subsequent encounter     Chondromalacia of right knee     Past Surgical History:   Procedure Laterality Date     ARTHROSCOPY KNEE WITH MEDIAL MENISCECTOMY Right 6/30/2021    Procedure: ARTHROSCOPY, RIGHT KNEE, WITH MEDIAL MENISCECTOMY, MAJOR CHONDROPLASTY.;  Surgeon: Jens Riley MD;  Location: [facility] OR     HEAD & NECK SURGERY       HERNIA REPAIR       LAMINECT/DISCECTOMY, CERVICAL  1999     [facility] APPENDECTOMY         Social History     Tobacco Use     Smoking status: Never Smoker     Smokeless tobacco: Never Used   Substance Use Topics     Alcohol use: Yes     Comment: rarely     Family History   Problem Relation Age of Onset     Prostate Cancer Father      Alzheimer Disease Paternal Grandmother      Hypertension Maternal Uncle         AGE 60'S         Current Outpatient Medications   Medication Sig Dispense Refill     acetaminophen (TYLENOL) 325 MG tablet Take 650 mg by mouth       allopurinol (ZYLOPRIM) 100 MG tablet TAKE 1 TABLET BY MOUTH TWICE DAILY FOR  GOUT 180 tablet 3     ascorbic acid (VITAMIN C) 1000 MG TABS Take 1 tablet by mouth daily.       carvedilol (COREG) 25 MG tablet TAKE 1 TABLET BY MOUTH TWICE DAILY WITH MEALS FOR BLOOD PRESSURE 180 tablet 3     chlorthalidone (HYGROTON) 25 MG tablet Take 1 tablet (25 mg) by mouth every morning For blood pressure. 90 tablet 3     ergocalciferol (VITAMIN D2) 400 units (10 mcg) TABS tablet Take 400 Units by mouth daily       fluticasone (FLONASE) 50 MCG/ACT nasal  spray Use 2 spray(s) in each nostril once daily 48 g 1     hydrALAZINE (APRESOLINE) 50 MG tablet TAKE 1 TABLET BY MOUTH THREE TIMES DAILY FOR BLOOD PRESSURE 270 tablet 3     indomethacin (INDOCIN) 50 MG capsule Take 1 capsule (50 mg) by mouth 3 times daily as needed for moderate pain 60 capsule 3     losartan (COZAAR) 100 MG tablet Take 1 tablet by mouth once daily for blood pressure 90 tablet 3     metFORMIN (GLUCOPHAGE) 500 MG tablet TAKE 1 TABLET (500MG) BY MOUTH TWICE DAILY WITH MEALS FOR DIABETES 180 tablet 1     MULTIPLE VITAMIN PO Take 1 tablet by mouth daily.       primidone (MYSOLINE) 50 MG tablet Take 50 mg in the morning and 100 mg in the evening for the next 4 weeks, then increase it to 100 mg 2 times per day. 120 tablet 2     Allergies   Allergen Reactions     [place] Grass Other (See Comments)     Coughing sneezing     Cats      Lisinopril Cough     Molds & Smuts Other (See Comments)     Coughing sneezing  Coughing sneezing       Shellfish Allergy Other (See Comments)     Causes gout     Recent Labs   Lab Test 10/26/21  1259 10/22/21  1526 06/17/21  1746 02/26/21  1132 08/11/20  1410 07/22/19  1000   A1C 6.6* 6.5* 6.3* 7.5*  --   --    LDL 49  --  69  --  52 63   HDL 50  --   --   --  45 37*   TRIG 100  --   --   --  107 100   ALT  --   --   --  27  --   --    CR  --   --  0.78 0.74 0.89  --    GFRESTIMATED  --   --  90 >90 85  --    GFRESTBLACK  --   --  >90 >90 >90  --    POTASSIUM  --   --  3.6 3.2* 3.4  --    TSH  --  0.96  --   --   --   --             Review of Systems   Constitutional: Negative for chills and fever.   HENT: Positive for hearing loss. Negative for congestion, ear pain and sore throat.    Eyes: Positive for visual disturbance. Negative for pain.   Respiratory: Negative for cough and shortness of breath.    Cardiovascular: Negative for chest pain, palpitations and peripheral edema.   Gastrointestinal: Negative for abdominal pain, constipation, diarrhea, heartburn, hematochezia and  "nausea.   Genitourinary: Positive for frequency. Negative for dysuria, genital sores, hematuria, impotence, penile discharge and urgency.   Musculoskeletal: Positive for arthralgias, joint swelling and myalgias.   Skin: Negative for rash.   Neurological: Negative for dizziness, weakness, headaches and paresthesias.   Psychiatric/Behavioral: Negative for mood changes. The patient is not nervous/anxious.      Constitutional, HEENT, cardiovascular, pulmonary, GI, , musculoskeletal, neuro, skin, endocrine and psych systems are negative, except as otherwise noted.    OBJECTIVE:   /72 (BP Location: Left arm, Patient Position: Sitting, Cuff Size: Adult Large)   Pulse 79   Temp 98.1  F (36.7  C) (Oral)   Resp 15   Ht 1.835 m (6' 0.24\")   Wt 110 kg (242 lb 6.4 oz)   SpO2 96%   BMI 32.65 kg/m   Estimated body mass index is 32.82 kg/m  as calculated from the following:    Height as of 8/29/22: 1.829 m (6').    Weight as of 8/29/22: 109.8 kg (242 lb).  Physical Exam  GENERAL: healthy, alert and no distress  NECK: no adenopathy, no asymmetry, masses, or scars and thyroid normal to palpation  RESP: lungs clear to auscultation - no rales, rhonchi or wheezes  CV: regular rate and rhythm, normal S1 S2, no S3 or S4, no murmur, click or rub, no peripheral edema and peripheral pulses strong  ABDOMEN: soft, nontender, no hepatosplenomegaly, no masses and bowel sounds normal  MS: no gross musculoskeletal defects noted, no edema  Diabetic foot exam: normal DP and PT pulses, no trophic changes or ulcerative lesions and normal sensory exam    Diagnostic Test Results:  Labs reviewed in Epic    ASSESSMENT / PLAN:   (Z00.00) Encounter for Medicare annual wellness exam  (primary encounter diagnosis)  Comment:   Plan: as below.    (E11.9) Type 2 diabetes mellitus without complication, without long-term current use of insulin (H)  Comment:   Plan: HEMOGLOBIN A1C, BASIC METABOLIC PANEL, Albumin         Random Urine Quantitative with " Creat Ratio,         metFORMIN (GLUCOPHAGE) 500 MG tablet        Recheck and adjust if needed.    (I10) Essential hypertension with goal blood pressure less than 140/90  Comment:   Plan: BASIC METABOLIC PANEL, Albumin Random Urine         Quantitative with Creat Ratio, carvedilol         (COREG) 25 MG tablet, chlorthalidone (HYGROTON)        25 MG tablet, hydrALAZINE (APRESOLINE) 50 MG         tablet, losartan (COZAAR) 100 MG tablet        controllled.    (Z13.6) CARDIOVASCULAR SCREENING; LDL GOAL LESS THAN 100  Comment:   Plan: Lipid panel reflex to direct LDL Non-fasting            (Z12.11) Screen for colon cancer  Comment:   Plan: COLOGUARD(EXACT SCIENCES)            (Z12.5) Screening for prostate cancer  Comment:   Plan: PSA, screen            (M1A.00X0) Idiopathic chronic gout without tophus, unspecified site  Comment:   Plan: allopurinol (ZYLOPRIM) 100 MG tablet            (Z23) Encounter for immunization  Comment:   Plan: Pneumococcal 20 Valent Conjugate (PCV20),         COVID-19,PF,MODERNA BIVALENT 18+Yrs, INFLUENZA,        QUAD, HD, PF, 65+ (FLUZONE HD)              Patient has been advised of split billing requirements and indicates understanding: Yes    COUNSELING:  Reviewed preventive health counseling, as reflected in patient instructions       Regular exercise       Healthy diet/nutrition       Vision screening    Estimated body mass index is 32.82 kg/m  as calculated from the following:    Height as of 8/29/22: 1.829 m (6').    Weight as of 8/29/22: 109.8 kg (242 lb).        He reports that he has never smoked. He has never used smokeless tobacco.      Appropriate preventive services were discussed with this patient, including applicable screening as appropriate for cardiovascular disease, diabetes, osteopenia/osteoporosis, and glaucoma.  As appropriate for age/gender, discussed screening for colorectal cancer, prostate cancer, breast cancer, and cervical cancer. Checklist reviewing preventive  services available has been given to the patient.    Reviewed patients plan of care and provided an AVS. The Basic Care Plan (routine screening as documented in Health Maintenance) for Bill meets the Care Plan requirement. This Care Plan has been established and reviewed with the Patient.    Counseling Resources:  ATP IV Guidelines  Pooled Cohorts Equation Calculator  Breast Cancer Risk Calculator  Breast Cancer: Medication to Reduce Risk  FRAX Risk Assessment  ICSI Preventive Guidelines  Dietary Guidelines for Americans, 2010  [facility]'s MyPlate  ASA Prophylaxis  Lung CA Screening    Nirmal Lubin MD, MD  [facility]    Identified Health Risks:    He is at risk for falling and has been provided with information to reduce the risk of falling at home.

## 2022-10-04 NOTE — PATIENT INSTRUCTIONS
Patient Education   Personalized Prevention Plan  You are due for the preventive services outlined below.  Your care team is available to assist you in scheduling these services.  If you have already completed any of these items, please share that information with your care team to update in your medical record.  Health Maintenance Due   Topic Date Due     Annual Wellness Visit  08/11/2021     Pneumococcal Vaccine (3 - PPSV23 or PCV20) 08/11/2021     COVID-19 Vaccine (4 - Booster for Moderna series) 12/18/2021     Colorectal Cancer Screening  12/29/2021     A1C Lab  01/26/2022     Basic Metabolic Panel  06/17/2022     Kidney Microalbumin Urine Test  06/17/2022     ANNUAL REVIEW OF HM ORDERS  06/17/2022     Flu Vaccine (1) 09/01/2022     Diabetic Foot Exam  10/22/2022     Cholesterol Lab  10/26/2022       Preventing Falls at Home  A person can fall for many reasons. Older adults may fall because reaction time slows down as we age. Your muscles and joints may get stiff, weak, or less flexible because of illness, medicines, or a physical condition.   Other health problems that make falls more likely include:     Arthritis    Dizziness or lightheadedness when you stand up (orthostatic hypotension)    History of a stroke    Dizziness    Anemia    Certain medicines taken for mental illness or to control blood pressure.    Problems with balance or gait    Bladder or urinary problems    History of falling    Changes in vision (vision impairment)    Changes in thinking skills and memory (cognitive impairment)  Injuries from a fall can include serious injuries such as broken bones, dislocated joints, internal bleeding and cuts. Injuries like these can limit your independence.   Prevention tips  To help prevent falls and fall-related injuries, follow the tips below.    Floors  To make floors safer:     Put nonskid pads under area rugs.    Remove small rugs.    Replace worn floor coverings.    Tack carpets firmly to each step  on carpeted stairs. Put nonskid strips on the edges of uncarpeted stairs.    Keep floors and stairs free of clutter and cords.    Arrange furniture so there are clear pathways.    Clean up any spills right away.  Bathrooms    To make bathrooms safer:     Install grab bars in the tub or shower.    Apply nonskid strips or put a nonskid rubber mat in the tub or shower.    Sit on a bath chair to bathe.    Use bathmats with nonskid backing.  Lighting  To improve visibility in your home:      Keep a flashlight in each room. Or put a lamp next to the bed within easy reach.    Put nightlights in the bedrooms, hallways, kitchen, and bathrooms.    Make sure all stairways have good lighting.    Take your time when going up and down stairs.    Put handrails on both sides of stairs and in walkways for more support. To prevent injury to your wrist or arm, don t use handrails to pull yourself up.    Install grab bars to pull yourself up.    Move or rearrange items that you use often. This will make them easier to find or reach.    Look at your home to find any safety hazards. Especially look at doorways, walkways, and the driveway. Remove or repair any safety problems that you find.  Other changes to make    Look around to find any safety hazards. Look closely at doorways, walkways, and the driveway. Remove or repair any safety problems that you find.    Wear shoes that fit well.    Take your time when going up and down stairs.    Put handrails on both sides of stairs and in walkways for more support. To prevent injury to your wrist or arm, don t use handrails to pull yourself up.    Install grab bars wherever needed to pull yourself up.    Arrange items that you use often. This will make them easier to find or reach.    [employer] last reviewed this educational content on 3/1/2020    9767-2474 [employer]. All rights reserved. This information is not intended as a substitute for professional medical care. Always  follow your healthcare professional's instructions.

## 2022-10-06 ENCOUNTER — LAB (OUTPATIENT)
Dept: LAB | Facility: CLINIC | Age: 72
End: 2022-10-06
Payer: MEDICARE

## 2022-10-06 DIAGNOSIS — E11.9 TYPE 2 DIABETES MELLITUS WITHOUT COMPLICATION, WITHOUT LONG-TERM CURRENT USE OF INSULIN (H): ICD-10-CM

## 2022-10-06 DIAGNOSIS — Z12.5 SCREENING FOR PROSTATE CANCER: ICD-10-CM

## 2022-10-06 DIAGNOSIS — I10 ESSENTIAL HYPERTENSION WITH GOAL BLOOD PRESSURE LESS THAN 140/90: ICD-10-CM

## 2022-10-06 DIAGNOSIS — Z13.6 CARDIOVASCULAR SCREENING; LDL GOAL LESS THAN 100: ICD-10-CM

## 2022-10-06 LAB — HBA1C MFR BLD: 6.7 % (ref 0–5.6)

## 2022-10-06 PROCEDURE — 80061 LIPID PANEL: CPT

## 2022-10-06 PROCEDURE — G0103 PSA SCREENING: HCPCS

## 2022-10-06 PROCEDURE — 80048 BASIC METABOLIC PNL TOTAL CA: CPT

## 2022-10-06 PROCEDURE — 36415 COLL VENOUS BLD VENIPUNCTURE: CPT

## 2022-10-06 PROCEDURE — 82043 UR ALBUMIN QUANTITATIVE: CPT

## 2022-10-06 PROCEDURE — 83036 HEMOGLOBIN GLYCOSYLATED A1C: CPT

## 2022-10-07 LAB
ANION GAP SERPL CALCULATED.3IONS-SCNC: 4 MMOL/L (ref 3–14)
BUN SERPL-MCNC: 9 MG/DL (ref 7–30)
CALCIUM SERPL-MCNC: 8.7 MG/DL (ref 8.5–10.1)
CHLORIDE BLD-SCNC: 107 MMOL/L (ref 94–109)
CHOLEST SERPL-MCNC: 129 MG/DL
CO2 SERPL-SCNC: 31 MMOL/L (ref 20–32)
CREAT SERPL-MCNC: 0.77 MG/DL (ref 0.66–1.25)
CREAT UR-MCNC: 220 MG/DL
FASTING STATUS PATIENT QL REPORTED: YES
GFR SERPL CREATININE-BSD FRML MDRD: >90 ML/MIN/1.73M2
GLUCOSE BLD-MCNC: 131 MG/DL (ref 70–99)
HDLC SERPL-MCNC: 38 MG/DL
LDLC SERPL CALC-MCNC: 60 MG/DL
MICROALBUMIN UR-MCNC: 75 MG/L
MICROALBUMIN/CREAT UR: 34.09 MG/G CR (ref 0–17)
NONHDLC SERPL-MCNC: 91 MG/DL
POTASSIUM BLD-SCNC: 3.5 MMOL/L (ref 3.4–5.3)
PSA SERPL-MCNC: 1.82 UG/L (ref 0–4)
SODIUM SERPL-SCNC: 142 MMOL/L (ref 133–144)
TRIGL SERPL-MCNC: 156 MG/DL

## 2022-10-16 LAB — NONINV COLON CA DNA+OCC BLD SCRN STL QL: NEGATIVE

## 2023-01-07 ENCOUNTER — HEALTH MAINTENANCE LETTER (OUTPATIENT)
Age: 73
End: 2023-01-07

## 2023-03-03 ENCOUNTER — TELEPHONE (OUTPATIENT)
Dept: NEUROLOGY | Facility: CLINIC | Age: 73
End: 2023-03-03
Payer: MEDICARE

## 2023-03-06 ENCOUNTER — TELEPHONE (OUTPATIENT)
Dept: NEUROLOGY | Facility: CLINIC | Age: 73
End: 2023-03-06
Payer: MEDICARE

## 2023-03-06 NOTE — TELEPHONE ENCOUNTER
Patient requesting a refill of Primeadone-   Needs to be sent to  [facility]  In [place]    [address] [facility] in [place]

## 2023-03-20 ENCOUNTER — MYC MEDICAL ADVICE (OUTPATIENT)
Dept: FAMILY MEDICINE | Facility: CLINIC | Age: 73
End: 2023-03-20
Payer: MEDICARE

## 2023-03-20 DIAGNOSIS — G25.0 ESSENTIAL TREMOR: ICD-10-CM

## 2023-03-21 RX ORDER — PRIMIDONE 50 MG/1
50 TABLET ORAL 3 TIMES DAILY
Qty: 270 TABLET | Refills: 0 | Status: SHIPPED | OUTPATIENT
Start: 2023-03-21 | End: 2023-07-26

## 2023-03-21 RX ORDER — PRIMIDONE 50 MG/1
50 TABLET ORAL 3 TIMES DAILY
Qty: 270 TABLET | Refills: 0 | Status: SHIPPED | OUTPATIENT
Start: 2023-03-21 | End: 2023-03-21

## 2023-04-18 DIAGNOSIS — E11.9 TYPE 2 DIABETES MELLITUS WITHOUT COMPLICATION, WITHOUT LONG-TERM CURRENT USE OF INSULIN (H): ICD-10-CM

## 2023-04-22 ENCOUNTER — HEALTH MAINTENANCE LETTER (OUTPATIENT)
Age: 73
End: 2023-04-22

## 2023-07-05 NOTE — TELEPHONE ENCOUNTER
DIAGNOSIS: R Knee replacement    APPOINTMENT DATE: 07/25/2023   NOTES STATUS DETAILS   OFFICE NOTE from referring provider Internal 06/26/2023 Dr Patel [facility]    OFFICE NOTE from other specialist Internal 08/30/2022 Dr Patel [facility]    DISCHARGE SUMMARY from hospital N/A    DISCHARGE REPORT from the ER N/A    OPERATIVE REPORT N/A    MEDICATION LIST N/A    EMG (for Spine) N/A    IMPLANT RECORD/STICKER N/A    LABS     CBC/DIFF N/A    CULTURES N/A    INJECTIONS DONE IN RADIOLOGY N/A    MRI Internal 02/10/2021 RT knee   CT SCAN N/A    XRAYS (IMAGES & REPORTS) Internal 02/23/2021 RT knee   TUMOR     PATHOLOGY  Slides & report N/A

## 2023-07-18 DIAGNOSIS — M25.569 KNEE PAIN: Primary | ICD-10-CM

## 2023-07-25 ENCOUNTER — ANCILLARY PROCEDURE (OUTPATIENT)
Dept: GENERAL RADIOLOGY | Facility: CLINIC | Age: 73
End: 2023-07-25
Attending: ORTHOPAEDIC SURGERY
Payer: MEDICARE

## 2023-07-25 ENCOUNTER — PRE VISIT (OUTPATIENT)
Dept: ORTHOPEDICS | Facility: CLINIC | Age: 73
End: 2023-07-25

## 2023-07-25 ENCOUNTER — TELEPHONE (OUTPATIENT)
Dept: ORTHOPEDICS | Facility: CLINIC | Age: 73
End: 2023-07-25

## 2023-07-25 ENCOUNTER — OFFICE VISIT (OUTPATIENT)
Dept: ORTHOPEDICS | Facility: CLINIC | Age: 73
End: 2023-07-25
Payer: MEDICARE

## 2023-07-25 VITALS — HEIGHT: 73 IN | BODY MASS INDEX: 32.93 KG/M2 | WEIGHT: 248.5 LBS

## 2023-07-25 DIAGNOSIS — M25.552 BILATERAL HIP PAIN: ICD-10-CM

## 2023-07-25 DIAGNOSIS — M17.11 PRIMARY LOCALIZED OSTEOARTHRITIS OF RIGHT KNEE: ICD-10-CM

## 2023-07-25 DIAGNOSIS — M25.569 KNEE PAIN: ICD-10-CM

## 2023-07-25 DIAGNOSIS — M25.551 BILATERAL HIP PAIN: ICD-10-CM

## 2023-07-25 DIAGNOSIS — M25.552 BILATERAL HIP PAIN: Primary | ICD-10-CM

## 2023-07-25 DIAGNOSIS — M25.551 BILATERAL HIP PAIN: Primary | ICD-10-CM

## 2023-07-25 DIAGNOSIS — Z96.659 STATUS POST TOTAL KNEE REPLACEMENT, UNSPECIFIED LATERALITY: Primary | ICD-10-CM

## 2023-07-25 PROBLEM — E11.9 DIABETES MELLITUS, TYPE 2 (H): Status: ACTIVE | Noted: 2023-07-25

## 2023-07-25 PROCEDURE — 72100 X-RAY EXAM L-S SPINE 2/3 VWS: CPT | Performed by: RADIOLOGY

## 2023-07-25 PROCEDURE — 73522 X-RAY EXAM HIPS BI 3-4 VIEWS: CPT | Performed by: RADIOLOGY

## 2023-07-25 PROCEDURE — 73562 X-RAY EXAM OF KNEE 3: CPT | Mod: RT | Performed by: RADIOLOGY

## 2023-07-25 PROCEDURE — 99214 OFFICE O/P EST MOD 30 MIN: CPT | Performed by: ORTHOPAEDIC SURGERY

## 2023-07-25 ASSESSMENT — PAIN SCALES - GENERAL: PAINLEVEL: EXTREME PAIN (8)

## 2023-07-25 NOTE — LETTER
7/25/2023         RE: Bill Smith  [address]   [place] [address]        Dear Colleague,    Thank you for referring your patient, Bill Smith, to the [facility]. Please see a copy of my visit note below.    Assessment: This is a 72 year old with moderate radiographic osteoarthritis of the right knee in the setting of arthroscopy images from 2 years ago with fairly large areas with exposed bone both on the medial tibial plateau and medial femoral condyle.  He has severe activities of daily living limiting symptoms despite comprehensive nonoperative management which has included activity modification oral pain medication multiple injections physical therapy and a cane for any distance walking.  Today we discussed the diagnosis and the treatment options including living with it total knee.We discussed the risks and benefits of total knee arthroplasty at length and reviewed the proposed surgical plan.  The discussion included but was not limited to the following:  blood clots, blood clots to the lungs, infection, injury to blood vessels and nerves, stiffness, and continued pain. We discussed that as part of the routine part of surgical care a qualified assist may finish closing the wound after I have left the room. We discussed the post-operative recovery for the typical patient, return to driving, and return to normal activities.  We discussed that his more radicular sounding symptoms are unlikely to be positively affected by knee replacement and that he had may have more symptoms in his back and leg in the postoperative period.  All the patient's questions were answered to the best of my ability.      Plan: Right total knee arthroplasty when the patient chooses to go forward with        Chief Complaint: Pain of the Right Knee (Previous meniscus repair with Dr. Riley in 6/30/2021)      Physician:  No ref. provider found    HPI: Bill Smith is a 72 year old male  who presents today for evaluation of his right knee     Symptom Profile  Location of symptoms:  medial joint line but also posterior knee and into the lateral calf. Sometimes to ipsilateral lateral buttock  Onset: insidious  Trend: getting worse   Duration of symptoms: about 3 years   Quality of symptoms: aching, sharp/stabbing  Severity: severe  Alleviate: activity modification  Exacerbating: activities  Previous Treatments: Previous treatments include activity modification, oral pain medication, steroid injection, visco injection, arthroscopy, nerve ablation  Had gopod results from the injections but for less than a week    ANTONIA High 31.3  PROMIS Mental: (P) 17  PROMIS Physical (P) 12  PROMIS TOTAL (P) 29  UCLA: 2    MEDICAL HISTORY:   Past Medical History:   Diagnosis Date     Acute medial meniscus tear of right knee      Gout      History of hernia repair      HTN      Idiopathic chronic gout without tophus, unspecified site 12/5/2018     Parathyroid adenoma 2007    parathyroidectomy 04/2007   DMII with A1C of 6.1    Medications:     Current Outpatient Medications:      acetaminophen (TYLENOL) 325 MG tablet, Take 650 mg by mouth, Disp: , Rfl:      allopurinol (ZYLOPRIM) 100 MG tablet, TAKE 1 TABLET BY MOUTH TWICE DAILY FOR  GOUT, Disp: 180 tablet, Rfl: 3     ascorbic acid (VITAMIN C) 1000 MG TABS, Take 1 tablet by mouth daily., Disp: , Rfl:      carvedilol (COREG) 25 MG tablet, TAKE 1 TABLET BY MOUTH TWICE DAILY WITH MEALS FOR BLOOD PRESSURE, Disp: 180 tablet, Rfl: 3     chlorthalidone (HYGROTON) 25 MG tablet, Take 1 tablet (25 mg) by mouth every morning For blood pressure., Disp: 90 tablet, Rfl: 3     ergocalciferol (VITAMIN D2) 400 units (10 mcg) TABS tablet, Take 400 Units by mouth daily, Disp: , Rfl:      fluticasone (FLONASE) 50 MCG/ACT nasal spray, Use 2 spray(s) in each nostril once daily, Disp: 48 g, Rfl: 1     hydrALAZINE (APRESOLINE) 50 MG tablet, TAKE 1 TABLET BY MOUTH THREE TIMES DAILY FOR BLOOD  PRESSURE, Disp: 270 tablet, Rfl: 3     indomethacin (INDOCIN) 50 MG capsule, Take 1 capsule (50 mg) by mouth 3 times daily as needed for moderate pain, Disp: 60 capsule, Rfl: 3     losartan (COZAAR) 100 MG tablet, Take 1 tablet by mouth once daily for blood pressure, Disp: 90 tablet, Rfl: 3     metFORMIN (GLUCOPHAGE) 500 MG tablet, TAKE 1 TABLET BY MOUTH TWICE DAILY WITH MEALS FOR DIABETES, Disp: 180 tablet, Rfl: 0     MULTIPLE VITAMIN PO, Take 1 tablet by mouth daily., Disp: , Rfl:      primidone (MYSOLINE) 50 MG tablet, Take 1 tablet (50 mg) by mouth 3 times daily, Disp: 270 tablet, Rfl: 0    Current Facility-Administered Medications:      cross-Linked Hyaluronate (GEL-ONE) injection PRSY 30 mg, 30 mg, , , Patrick Patel, DO, 30 mg at 01/07/21 1431     triamcinolone (KENALOG-40) injection 40 mg, 40 mg, , , Patrick Pateln, DO, 40 mg at 08/30/22 1118     triamcinolone (KENALOG-40) injection 40 mg, 40 mg, , , Patrick Patel Larry, DO, 40 mg at 08/30/22 1118     triamcinolone (KENALOG-40) injection 40 mg, 40 mg, , , Patrick Patel Larry, DO, 40 mg at 09/16/21 0931     triamcinolone (KENALOG-40) injection 40 mg, 40 mg, , , Patrick Patel Larry, DO, 40 mg at 09/16/21 0931     triamcinolone (KENALOG-40) injection 40 mg, 40 mg, , , Patrick Patel Larry, DO, 40 mg at 12/22/20 1155     triamcinolone (KENALOG-40) injection 40 mg, 40 mg, , , Patrick Patel Larry, DO, 40 mg at 12/22/20 1155     triamcinolone (KENALOG-40) injection 40 mg, 40 mg, , , Patrick Patel Larry, DO, 40 mg at 07/20/20 1612     triamcinolone (KENALOG-40) injection 40 mg, 40 mg, , , Patrick Patel Larry, DO, 40 mg at 07/20/20 1612     triamcinolone (KENALOG-40) injection 40 mg, 40 mg, , , Patrick Patel, DO, 40 mg at 08/29/19 1102     triamcinolone (KENALOG-40) injection 40 mg, 40 mg, , , Patrick Patel, , 40 mg at 08/29/19 1102    Allergies: Bermuda grass extract, Cats, Lisinopril, Molds & smuts, and Shellfish allergy    SURGICAL HISTORY:   Past Surgical History:    Procedure Laterality Date     ARTHROSCOPY KNEE WITH MEDIAL MENISCECTOMY Right 6/30/2021    Procedure: ARTHROSCOPY, RIGHT KNEE, WITH MEDIAL MENISCECTOMY, MAJOR CHONDROPLASTY.;  Surgeon: Jens Riley MD;  Location: [facility] OR     HEAD & NECK SURGERY       HERNIA REPAIR       LAMINECT/DISCECTOMY, CERVICAL  1999     [facility] APPENDECTOMY     Cervical laminectomy    FAMILY HISTORY:   Family History   Problem Relation Age of Onset     Prostate Cancer Father      Alzheimer Disease Paternal Grandmother      Hypertension Maternal Uncle         AGE 60'S       SOCIAL HISTORY:   Social History     Tobacco Use     Smoking status: Never     Smokeless tobacco: Never   Substance Use Topics     Alcohol use: Yes     Comment: rarely   EtOH = rarely    lives with wife. Moon in [place]   Retired, auto parts sales       REVIEW OF SYSTEMS:  The comprehensive review of systems from the intake form was reviewed with the patient.  No fever, weight change or fatigue. No dry eyes. No oral ulcers, sore throat or voice change. No palpitations, syncope, angina or edema.  No chest pain, excessive sleepiness, shortness of breath or hemoptysis.   No abdominal pain, nausea, vomiting, diarrhea or heartburn.  No skin rash. No focal weakness or numbness. No bleeding or lymphadenopathy. No rhinitis or hives.     Exam:  On physical examination the patient appears the stated age, is in no acute distress, affect is appropriate, and breathing is non-labored.  Vitals are documented in the EMR and have been reviewed:    There were no vitals taken for this visit.  Data Unavailable  There is no height or weight on file to calculate BMI.    Rises from chair:  Gait:  Gains the exam table:    Left   Knee  Appearance: benign  Clinical alignment: neutral   Effusion: no  Tenderness to palpation:no  Extension:0  Flexion: 135  Collateral ligaments: intact  Cruciate ligaments: grossly intact     Right  Knee  Appearance: benign  Clinical alignment: mild  varus   Effusion:moderate   Tenderness to palpation:medial and lateral joint lines and superior pole of patella   Extension:4  Flexion:  110  Collateral ligaments: intact  Cruciate ligaments: grossly intact     Hip examination: benign hip ROM with groin or anterior thigh symptoms. Decreased ROM on the right compared with left (les IRF y 10 degrees)    Distally, the circulatory, motor, and sensation exam is intact with 5/5 EHL, gastroc-soleus, and tibialis anterior.    Sensation to light touch is intact.    Dorsalis pedis and posterior tibialis pulses are palpable.    There are no sores on the feet, no bruising, and no lymphedema.    Intra-operative imaging from 2021 shows areas of grade 4 cartilage loss medial tibial plateau and areas of 3-4 on the femoral condyle.     X-rays:   Kellgren grade 3 with more medial based changes and medial translation of the femur on the tibia on the AP    I obtained an AP pelvis and crosstable lateral hip of the ipsilateral right hip.  This appears within normal limits.  We also obtained AP and lateral lumbar spine and this shows degenerative changes in the inferior disc spaces perhaps consistent with his radicular symptoms.  This also is consistent with his history of cervical spine degenerative changes treated with laminectomy.          Again, thank you for allowing me to participate in the care of your patient.        Sincerely,        Miky Jarvis MD

## 2023-07-25 NOTE — NURSING NOTE
Bill Smith's chief complaint for this visit includes:  Chief Complaint   Patient presents with    Right Knee - Pain     Previous meniscus repair with Dr. Riley in 6/30/2021       Referring Provider:  No referring provider defined for this encounter.    There were no vitals taken for this visit.  Extreme Pain (8)   Global Mental Health Score: (P) 17  Global Physical Health Score: (P) 12  PROMIS TOTAL - SUBSCORES: (P) 29  UCLA: 2    Pain increases with: standing, sitting, stairs, walking, flexion  Previous surgeries: medical meniscectomy 6/2021, nerve ablation 4/2023  Previous injections within last 6 months: NO  Treatments done: nerve ablation, prior steroid injections, HA injections  Imaging completed: yes  Pain: 8/10  Concerns: Would like to discuss total knee replacement - Is leaving for [place] in November.    Betty Sweeney, CMA

## 2023-07-25 NOTE — PROGRESS NOTES
ESTABLISHED PATIENT NEUROLOGY NOTE    DATE OF VISIT: 7/26/2023  CLINIC LOCATION: [facility]  MRN: [id number]  PATIENT NAME: Bill Smith  YOB: 1950    REASON FOR VISIT: No chief complaint on file.    SUBJECTIVE:                                                      HISTORY OF PRESENT ILLNESS: Patient is here to follow up regarding essential tremor.  The last visit was on 8/29/2022.  After that the patient canceled twice.  Please refer to my initial/other prior notes for further information.    Since the last visit, the patient reports ***.  He is on primidone 100 mg   EXAM:                                                    Physical Exam:   Vitals: There were no vitals taken for this visit.    General: pt is in NAD, cooperative.  Skin: normal turgor, moist mucous membranes, no lesions/rashes noticed.  HEENT: ATNC, white sclera, normal conjunctiva.  Respiratory: Symmetric lung excursion, no accessory respiratory muscle use.  Abdomen: Non distended.  Neurological: awake, cooperative, follows commands, no aphasia or dysarthria noted, cranial nerves II-XII: no ptosis, extraocular motility is full, face is symmetric, tongue is midline, equally moves all extremities, normal tone in upper and lower extremities, sensation to the light touch is intact bilaterally, pronator drift is negative, finger to nose intact bilaterally, casual gait is normal.  ASSESSMENT AND PLAN:                                                    Assessment: 72 year old male patient presents for follow-up of ***.    Diagnoses:  No diagnosis found.  Plan:  There are no Patient Instructions on file for this visit.    Total Time: *** minutes spent on the date of the encounter doing chart review, history and exam, documentation and further activities per the note.    Guillermo Rivera MD  [facility]  (Chart documentation was completed in part with Dragon voice-recognition software. Even though  reviewed, some grammatical, spelling, and word errors may remain.)

## 2023-07-25 NOTE — TELEPHONE ENCOUNTER
Date Scheduled: 8-11-23  Facility: Surgery Locations: [facility]  Surgeon: Dr. Jarvis   Post-op appointment scheduled:    scheduled?: No  Surgery packet/instructions confirmed received?  Yes  [facility]  Special Considerations:     Leaving to drive to [place] in the Fall and will be gone all winter.     Carmel Murcia  Surgery Scheduling Coordinator  Ph: [phone]

## 2023-07-25 NOTE — TELEPHONE ENCOUNTER
Procedure: TKA  Facility: [facility]  Length: 120 minutes  Anesthesia: [facility]  Post-op appointments needed: 2 weeks nurse only, 6 weeks with provider only.  Surgery packet/instructions given to patient?  Yes     Will call patient for surgery teaching.    Iona Jorge RN

## 2023-07-25 NOTE — PROGRESS NOTES
Assessment: This is a 72 year old with moderate radiographic osteoarthritis of the right knee in the setting of arthroscopy images from 2 years ago with fairly large areas with exposed bone both on the medial tibial plateau and medial femoral condyle.  He has severe activities of daily living limiting symptoms despite comprehensive nonoperative management which has included activity modification oral pain medication multiple injections physical therapy and a cane for any distance walking.  Today we discussed the diagnosis and the treatment options including living with it total knee.We discussed the risks and benefits of total knee arthroplasty at length and reviewed the proposed surgical plan.  The discussion included but was not limited to the following:  blood clots, blood clots to the lungs, infection, injury to blood vessels and nerves, stiffness, and continued pain. We discussed that as part of the routine part of surgical care a qualified assist may finish closing the wound after I have left the room. We discussed the post-operative recovery for the typical patient, return to driving, and return to normal activities.  We discussed that his more radicular sounding symptoms are unlikely to be positively affected by knee replacement and that he had may have more symptoms in his back and leg in the postoperative period.  All the patient's questions were answered to the best of my ability.      Plan: Right total knee arthroplasty when the patient chooses to go forward with        Chief Complaint: Pain of the Right Knee (Previous meniscus repair with Dr. Riley in 6/30/2021)      Physician:  No ref. provider found    HPI: Bill Smith is a 72 year old male who presents today for evaluation of his right knee     Symptom Profile  Location of symptoms:  medial joint line but also posterior knee and into the lateral calf. Sometimes to ipsilateral lateral buttock  Onset: insidious  Trend: getting worse   Duration of  symptoms: about 3 years   Quality of symptoms: aching, sharp/stabbing  Severity: severe  Alleviate: activity modification  Exacerbating: activities  Previous Treatments: Previous treatments include activity modification, oral pain medication, steroid injection, visco injection, arthroscopy, nerve ablation  Had gopod results from the injections but for less than a week    ANTONIA High 31.3  PROMIS Mental: (P) 17  PROMIS Physical (P) 12  PROMIS TOTAL (P) 29  [facility]: 2    MEDICAL HISTORY:   Past Medical History:   Diagnosis Date    Acute medial meniscus tear of right knee     Gout     History of hernia repair     HTN     Idiopathic chronic gout without tophus, unspecified site 12/5/2018    Parathyroid adenoma 2007    parathyroidectomy 04/2007   DMII with A1C of 6.1    Medications:     Current Outpatient Medications:     acetaminophen (TYLENOL) 325 MG tablet, Take 650 mg by mouth, Disp: , Rfl:     allopurinol (ZYLOPRIM) 100 MG tablet, TAKE 1 TABLET BY MOUTH TWICE DAILY FOR  GOUT, Disp: 180 tablet, Rfl: 3    ascorbic acid (VITAMIN C) 1000 MG TABS, Take 1 tablet by mouth daily., Disp: , Rfl:     carvedilol (COREG) 25 MG tablet, TAKE 1 TABLET BY MOUTH TWICE DAILY WITH MEALS FOR BLOOD PRESSURE, Disp: 180 tablet, Rfl: 3    chlorthalidone (HYGROTON) 25 MG tablet, Take 1 tablet (25 mg) by mouth every morning For blood pressure., Disp: 90 tablet, Rfl: 3    ergocalciferol (VITAMIN D2) 400 units (10 mcg) TABS tablet, Take 400 Units by mouth daily, Disp: , Rfl:     fluticasone (FLONASE) 50 MCG/ACT nasal spray, Use 2 spray(s) in each nostril once daily, Disp: 48 g, Rfl: 1    hydrALAZINE (APRESOLINE) 50 MG tablet, TAKE 1 TABLET BY MOUTH THREE TIMES DAILY FOR BLOOD PRESSURE, Disp: 270 tablet, Rfl: 3    indomethacin (INDOCIN) 50 MG capsule, Take 1 capsule (50 mg) by mouth 3 times daily as needed for moderate pain, Disp: 60 capsule, Rfl: 3    losartan (COZAAR) 100 MG tablet, Take 1 tablet by mouth once daily for blood pressure, Disp: 90  tablet, Rfl: 3    metFORMIN (GLUCOPHAGE) 500 MG tablet, TAKE 1 TABLET BY MOUTH TWICE DAILY WITH MEALS FOR DIABETES, Disp: 180 tablet, Rfl: 0    MULTIPLE VITAMIN PO, Take 1 tablet by mouth daily., Disp: , Rfl:     primidone (MYSOLINE) 50 MG tablet, Take 1 tablet (50 mg) by mouth 3 times daily, Disp: 270 tablet, Rfl: 0    Current Facility-Administered Medications:     cross-Linked Hyaluronate (GEL-ONE) injection PRSY 30 mg, 30 mg, , , Patrick Patel Larry, DO, 30 mg at 01/07/21 1431    triamcinolone (KENALOG-40) injection 40 mg, 40 mg, , , Patrick Patel Larry, DO, 40 mg at 08/30/22 1118    triamcinolone (KENALOG-40) injection 40 mg, 40 mg, , , Patrikc Patel Larry, DO, 40 mg at 08/30/22 1118    triamcinolone (KENALOG-40) injection 40 mg, 40 mg, , , Patrick Patel Larry, DO, 40 mg at 09/16/21 0931    triamcinolone (KENALOG-40) injection 40 mg, 40 mg, , , Patrick Patel Larry, DO, 40 mg at 09/16/21 0931    triamcinolone (KENALOG-40) injection 40 mg, 40 mg, , , Patrick Patel Larry, DO, 40 mg at 12/22/20 1155    triamcinolone (KENALOG-40) injection 40 mg, 40 mg, , , Patrick Patel Larry, DO, 40 mg at 12/22/20 1155    triamcinolone (KENALOG-40) injection 40 mg, 40 mg, , , Patrick Patel Larry, DO, 40 mg at 07/20/20 1612    triamcinolone (KENALOG-40) injection 40 mg, 40 mg, , , Patrick Patel Larry, DO, 40 mg at 07/20/20 1612    triamcinolone (KENALOG-40) injection 40 mg, 40 mg, , , Patrick Patel Larry, DO, 40 mg at 08/29/19 1102    triamcinolone (KENALOG-40) injection 40 mg, 40 mg, , , Patrick Patel Larry, DO, 40 mg at 08/29/19 1102    Allergies: Bermuda grass extract, Cats, Lisinopril, Molds & smuts, and Shellfish allergy    SURGICAL HISTORY:   Past Surgical History:   Procedure Laterality Date    ARTHROSCOPY KNEE WITH MEDIAL MENISCECTOMY Right 6/30/2021    Procedure: ARTHROSCOPY, RIGHT KNEE, WITH MEDIAL MENISCECTOMY, MAJOR CHONDROPLASTY.;  Surgeon: Jens Riley MD;  Location: [facility] OR    HEAD & NECK SURGERY      HERNIA REPAIR      LAMINECT/DISCECTOMY,  CERVICAL  1999    [facility] APPENDECTOMY     Cervical laminectomy    FAMILY HISTORY:   Family History   Problem Relation Age of Onset    Prostate Cancer Father     Alzheimer Disease Paternal Grandmother     Hypertension Maternal Uncle         AGE 60'S       SOCIAL HISTORY:   Social History     Tobacco Use    Smoking status: Never    Smokeless tobacco: Never   Substance Use Topics    Alcohol use: Yes     Comment: rarely   EtOH = rarely    lives with wife. Moon in [place]   Retired, auto parts sales       REVIEW OF SYSTEMS:  The comprehensive review of systems from the intake form was reviewed with the patient.  No fever, weight change or fatigue. No dry eyes. No oral ulcers, sore throat or voice change. No palpitations, syncope, angina or edema.  No chest pain, excessive sleepiness, shortness of breath or hemoptysis.   No abdominal pain, nausea, vomiting, diarrhea or heartburn.  No skin rash. No focal weakness or numbness. No bleeding or lymphadenopathy. No rhinitis or hives.     Exam:  On physical examination the patient appears the stated age, is in no acute distress, affect is appropriate, and breathing is non-labored.  Vitals are documented in the EMR and have been reviewed:    There were no vitals taken for this visit.  Data Unavailable  There is no height or weight on file to calculate BMI.    Rises from chair:  Gait:  Gains the exam table:    Left   Knee  Appearance: benign  Clinical alignment: neutral   Effusion: no  Tenderness to palpation:no  Extension:0  Flexion: 135  Collateral ligaments: intact  Cruciate ligaments: grossly intact     Right  Knee  Appearance: benign  Clinical alignment: mild varus   Effusion:moderate   Tenderness to palpation:medial and lateral joint lines and superior pole of patella   Extension:4  Flexion:  110  Collateral ligaments: intact  Cruciate ligaments: grossly intact     Hip examination: benign hip ROM with groin or anterior thigh symptoms. Decreased ROM on the right  compared with left (les IRF y 10 degrees)    Distally, the circulatory, motor, and sensation exam is intact with 5/5 EHL, gastroc-soleus, and tibialis anterior.    Sensation to light touch is intact.    Dorsalis pedis and posterior tibialis pulses are palpable.    There are no sores on the feet, no bruising, and no lymphedema.    Intra-operative imaging from 2021 shows areas of grade 4 cartilage loss medial tibial plateau and areas of 3-4 on the femoral condyle.     X-rays:   Kellgren grade 3 with more medial based changes and medial translation of the femur on the tibia on the AP    I obtained an AP pelvis and crosstable lateral hip of the ipsilateral right hip.  This appears within normal limits.  We also obtained AP and lateral lumbar spine and this shows degenerative changes in the inferior disc spaces perhaps consistent with his radicular symptoms.  This also is consistent with his history of cervical spine degenerative changes treated with laminectomy.

## 2023-07-25 NOTE — TELEPHONE ENCOUNTER
Date Scheduled: 8-11-23  Facility: Surgery Locations: [facility]  Surgeon: Dr. Jarvis   Post-op appointment scheduled:    scheduled?: No  Surgery packet/instructions confirmed received?  Yes  [facility]  Special Considerations:

## 2023-07-26 ENCOUNTER — OFFICE VISIT (OUTPATIENT)
Dept: NEUROLOGY | Facility: CLINIC | Age: 73
End: 2023-07-26
Payer: MEDICARE

## 2023-07-26 VITALS
HEIGHT: 73 IN | SYSTOLIC BLOOD PRESSURE: 153 MMHG | WEIGHT: 251.25 LBS | HEART RATE: 80 BPM | OXYGEN SATURATION: 97 % | DIASTOLIC BLOOD PRESSURE: 93 MMHG | BODY MASS INDEX: 33.3 KG/M2

## 2023-07-26 DIAGNOSIS — R20.2 RIGHT LEG PARESTHESIAS: ICD-10-CM

## 2023-07-26 DIAGNOSIS — G25.0 ESSENTIAL TREMOR: Primary | ICD-10-CM

## 2023-07-26 PROCEDURE — 99214 OFFICE O/P EST MOD 30 MIN: CPT | Performed by: PSYCHIATRY & NEUROLOGY

## 2023-07-26 RX ORDER — PRIMIDONE 50 MG/1
TABLET ORAL
Qty: 270 TABLET | Refills: 1 | Status: SHIPPED | OUTPATIENT
Start: 2023-07-26 | End: 2023-11-13

## 2023-07-26 NOTE — LETTER
"    7/26/2023         RE: Bill Smith  [address] [facility]  [place] [address]        Dear Colleague,    Thank you for referring your patient, Bill Smith, to the [facility]. Please see a copy of my visit note below.    ESTABLISHED PATIENT NEUROLOGY NOTE    DATE OF VISIT: 7/26/2023  CLINIC LOCATION: [facility]  MRN: [id number]  PATIENT NAME: Bill Smith  YOB: 1950    REASON FOR VISIT:   Chief Complaint   Patient presents with     Tremors     Tremors follow up left hand worse then right     SUBJECTIVE:                                                      HISTORY OF PRESENT ILLNESS: Patient is here to follow up regarding essential tremor and right leg paresthesia.  The last visit was on 8/29/2022.  After that the patient canceled twice and did not come until now.  Please refer to my initial/other prior notes for further information.    Since the last visit, the patient reports continued bilateral hand tremor with left hand being worse than right.  He is on primidone 50 mg twice daily without noticeable side effects.  He feels that this medication is helpful.  He denies interval development of new neurological symptoms. Spends 6 mo in [place] (November through May).  Plans to have right knee surgery done in the beginning of August.  Right leg paresthesia is stable.  EXAM:                                                    Physical Exam:   Vitals: BP (!) 159/93 (BP Location: Left arm, Patient Position: Sitting, Cuff Size: Adult Regular)   Pulse 80   Ht 1.845 m (6' 0.64\")   Wt 114 kg (251 lb 4 oz)   SpO2 97%   BMI 33.48 kg/m      General: pt is in NAD, cooperative.  Skin: normal turgor, moist mucous membranes, no lesions/rashes noticed.  HEENT: ATNC, white sclera, normal conjunctiva.  Respiratory: Symmetric lung excursion, no accessory respiratory muscle use.  Abdomen: Non distended.  Neurological: awake, cooperative, follows " commands, cranial nerves are intact, equally moves all extremities, has mild right and moderate left positional and kinetic hand tremor, completed tremor worksheet that will be scanned into his record.  Walks with a cane.  ASSESSMENT AND PLAN:                                                    Assessment: 72 year old male patient presents for follow-up of essential tremor.  He reports that the current dose of primidone is helping, but not fully controlling his symptoms.  I would suggest increasing the dose further, as previously discussed to at least 50/100 mg daily.  We might increase it further to 100 mg twice daily if needed.  I sent an updated prescription to his pharmacy.    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  He would like to see how his right knee surgery goes before doing anything else, but we discussed option of EMG to further clarify his diagnosis.  This could be considered once he recovers after the knee surgery while he is still in [place].    Bill to follow up with Primary Care provider regarding elevated blood pressure.     Diagnoses:    ICD-10-CM    1. Essential tremor  G25.0 primidone (MYSOLINE) 50 MG tablet      2. Right leg paresthesias  R20.2         Plan: At today's visit we thoroughly discussed current symptoms, available treatment options, and the plan.    We decided to increase the dose of primidone.  Advised the patient to take 50 mg in the morning and 100 mg in the evening after he recovers from his knee surgery.  I also advised him to reach out if he experience any intolerable side effects.    Next follow-up appointment is in the next 4 months or earlier if needed.    Total Time: 33 minutes spent on the date of the encounter doing chart review, history and exam, documentation and further activities per the note.    MD ROSE Osborn  [facility]  (Chart documentation was completed in part with Dragon voice-recognition software. Even though reviewed, some grammatical, spelling, and word errors may remain.)      Again, thank you for allowing me to participate in the care of your patient.        Sincerely,        Guillermo Rivera MD

## 2023-07-26 NOTE — PATIENT INSTRUCTIONS
AFTER VISIT SUMMARY (AVS):    At today's visit we thoroughly discussed current symptoms, available treatment options, and the plan.    We decided to increase the dose of primidone.  Please take 50 mg in the morning and 100 mg in the evening after you recover from your knee surgery.    Next follow-up appointment is in the next 4 months or earlier if needed.    Please do not hesitate to call me with any questions or concerns.    Thanks.

## 2023-07-26 NOTE — PROGRESS NOTES
"ESTABLISHED PATIENT NEUROLOGY NOTE    DATE OF VISIT: 7/26/2023  CLINIC LOCATION: [facility]  MRN: [id number]  PATIENT NAME: Bill Smith  YOB: 1950    REASON FOR VISIT:   Chief Complaint   Patient presents with    Tremors     Tremors follow up left hand worse then right     SUBJECTIVE:                                                      HISTORY OF PRESENT ILLNESS: Patient is here to follow up regarding essential tremor and right leg paresthesia.  The last visit was on 8/29/2022.  After that the patient canceled twice and did not come until now.  Please refer to my initial/other prior notes for further information.    Since the last visit, the patient reports continued bilateral hand tremor with left hand being worse than right.  He is on primidone 50 mg twice daily without noticeable side effects.  He feels that this medication is helpful.  He denies interval development of new neurological symptoms. Spends 6 mo in [place] (November through May).  Plans to have right knee surgery done in the beginning of August.  Right leg paresthesia is stable.  EXAM:                                                    Physical Exam:   Vitals: BP (!) 159/93 (BP Location: Left arm, Patient Position: Sitting, Cuff Size: Adult Regular)   Pulse 80   Ht 1.845 m (6' 0.64\")   Wt 114 kg (251 lb 4 oz)   SpO2 97%   BMI 33.48 kg/m      General: pt is in NAD, cooperative.  Skin: normal turgor, moist mucous membranes, no lesions/rashes noticed.  HEENT: ATNC, white sclera, normal conjunctiva.  Respiratory: Symmetric lung excursion, no accessory respiratory muscle use.  Abdomen: Non distended.  Neurological: awake, cooperative, follows commands, cranial nerves are intact, equally moves all extremities, has mild right and moderate left positional and kinetic hand tremor, completed tremor worksheet that will be scanned into his record.  Walks with a cane.  ASSESSMENT AND PLAN:                      "                               Assessment: 72 year old male patient presents for follow-up of essential tremor.  He reports that the current dose of primidone is helping, but not fully controlling his symptoms.  I would suggest increasing the dose further, as previously discussed to at least 50/100 mg daily.  We might increase it further to 100 mg twice daily if needed.  I sent an updated prescription to his pharmacy.    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  He would like to see how his right knee surgery goes before doing anything else, but we discussed option of EMG to further clarify his diagnosis.  This could be considered once he recovers after the knee surgery while he is still in [place].    Bill to follow up with Primary Care provider regarding elevated blood pressure.     Diagnoses:    ICD-10-CM    1. Essential tremor  G25.0 primidone (MYSOLINE) 50 MG tablet      2. Right leg paresthesias  R20.2         Plan: At today's visit we thoroughly discussed current symptoms, available treatment options, and the plan.    We decided to increase the dose of primidone.  Advised the patient to take 50 mg in the morning and 100 mg in the evening after he recovers from his knee surgery.  I also advised him to reach out if he experience any intolerable side effects.    Next follow-up appointment is in the next 4 months or earlier if needed.    Total Time: 33 minutes spent on the date of the encounter doing chart review, history and exam, documentation and further activities per the note.    Guillermo Rivera MD  [facility] Neurology  (Chart documentation was completed in part with Dragon voice-recognition software. Even though reviewed, some grammatical, spelling, and word errors may remain.)

## 2023-07-26 NOTE — NURSING NOTE
"Bill Smith is a 72 year old male who presents for:  Chief Complaint   Patient presents with    Tremors     Tremors follow up left hand worse then right        Initial Vitals:  BP (!) 159/93 (BP Location: Left arm, Patient Position: Sitting, Cuff Size: Adult Regular)   Pulse 80   Ht 1.845 m (6' 0.64\")   Wt 114 kg (251 lb 4 oz)   SpO2 97%   BMI 33.48 kg/m   Estimated body mass index is 33.48 kg/m  as calculated from the following:    Height as of this encounter: 1.845 m (6' 0.64\").    Weight as of this encounter: 114 kg (251 lb 4 oz).. Body surface area is 2.42 meters squared. BP completed using cuff size: regular    Tara Bazan   "

## 2023-07-31 ENCOUNTER — TELEPHONE (OUTPATIENT)
Dept: FAMILY MEDICINE | Facility: CLINIC | Age: 73
End: 2023-07-31
Payer: MEDICARE

## 2023-07-31 NOTE — TELEPHONE ENCOUNTER
Alert and oriented. VSS. Up independently to bedside commode, any other transfers calls for assistance.  Leg redness w/in marked borders.  Denies discomfort.  Blisters present on lower back intact and open to air.  /90 (BP Location: Right arm)  Pulse 87  Temp 98.5  F (36.9  C) (Oral)  Resp 18  Wt (!) 180.5 kg (397 lb 14.9 oz)  SpO2 97%  BMI 68.09 kg/m2     Called pt and scheduled appt for 8/8/23

## 2023-07-31 NOTE — TELEPHONE ENCOUNTER
Reason for Call:  Appointment Request    Patient requesting this type of appt: Pre-op    Requested provider: Nirmal Lubin    Reason patient unable to be scheduled: Not within requested timeframe    When does patient want to be seen/preferred time: 3-7 days    Comments: Patient's surgery is 8/11/2023 Right knee replacement    Could we send this information to you in [employer] or would you prefer to receive a phone call?:   Patient would prefer a phone call   Okay to leave a detailed message?: Yes at Cell number on file:    Telephone Information:   Mobile [phone]           Call taken on 7/31/2023 at 9:48 AM by Edith Maddox MA

## 2023-08-02 NOTE — PROGRESS NOTES
Planning to discharge home on POD 1 in the morning with his wife helping him.       07/29/23 0106   Discharge Planning   Patient/Family Anticipates Transition to home;home with family  (outpatient PT arranged at [facility])   Barriers to Discharge  --    Comment, Barriers to Discharge  --    Concerns to be Addressed all concerns addressed in this encounter   Living Arrangements   People in Home spouse   Type of Residence Private Residence   Is your private residence a single family home or apartment? Single family home   Number of Stairs, Within Home, Primary greater than 10 stairs   Stair Railings, Within Home, Primary railings safe and in good condition   Once home, are you able to live on one level? Yes  (if sleeps on couch)   Which level? Main Level   Bathroom Shower/Tub Walk-in shower   Equipment Currently Used at Home cane, straight;crutches;grab bar, tub/shower;shower chair;dressing device  (Has a shoe horn)   Support System   Support Systems Spouse/Significant Other  (wife, Brooke)   Do you have someone available to stay with you one or two nights once you are home? Yes   Education   Patient attended total joint pre-op class/received pre-op teaching  email/phone call

## 2023-08-04 DIAGNOSIS — E11.9 TYPE 2 DIABETES MELLITUS WITHOUT COMPLICATION, WITHOUT LONG-TERM CURRENT USE OF INSULIN (H): ICD-10-CM

## 2023-08-08 ENCOUNTER — OFFICE VISIT (OUTPATIENT)
Dept: FAMILY MEDICINE | Facility: CLINIC | Age: 73
End: 2023-08-08
Payer: MEDICARE

## 2023-08-08 VITALS
TEMPERATURE: 98.6 F | RESPIRATION RATE: 15 BRPM | OXYGEN SATURATION: 95 % | SYSTOLIC BLOOD PRESSURE: 135 MMHG | BODY MASS INDEX: 31.99 KG/M2 | WEIGHT: 241.4 LBS | HEIGHT: 73 IN | DIASTOLIC BLOOD PRESSURE: 80 MMHG | HEART RATE: 87 BPM

## 2023-08-08 DIAGNOSIS — I10 ESSENTIAL HYPERTENSION WITH GOAL BLOOD PRESSURE LESS THAN 140/90: ICD-10-CM

## 2023-08-08 DIAGNOSIS — Z23 NEED FOR DIPHTHERIA-TETANUS-PERTUSSIS (TDAP) VACCINE: ICD-10-CM

## 2023-08-08 DIAGNOSIS — Z01.818 PREOP GENERAL PHYSICAL EXAM: Primary | ICD-10-CM

## 2023-08-08 DIAGNOSIS — E11.9 TYPE 2 DIABETES MELLITUS WITHOUT COMPLICATION, WITHOUT LONG-TERM CURRENT USE OF INSULIN (H): ICD-10-CM

## 2023-08-08 LAB
ERYTHROCYTE [DISTWIDTH] IN BLOOD BY AUTOMATED COUNT: 13.1 % (ref 10–15)
HBA1C MFR BLD: 6.8 % (ref 0–5.6)
HCT VFR BLD AUTO: 43.7 % (ref 40–53)
HGB BLD-MCNC: 14.8 G/DL (ref 13.3–17.7)
MCH RBC QN AUTO: 29.1 PG (ref 26.5–33)
MCHC RBC AUTO-ENTMCNC: 33.9 G/DL (ref 31.5–36.5)
MCV RBC AUTO: 86 FL (ref 78–100)
PLATELET # BLD AUTO: 229 10E3/UL (ref 150–450)
RBC # BLD AUTO: 5.08 10E6/UL (ref 4.4–5.9)
WBC # BLD AUTO: 10.3 10E3/UL (ref 4–11)

## 2023-08-08 PROCEDURE — 80048 BASIC METABOLIC PNL TOTAL CA: CPT | Performed by: FAMILY MEDICINE

## 2023-08-08 PROCEDURE — 99214 OFFICE O/P EST MOD 30 MIN: CPT | Mod: 25 | Performed by: FAMILY MEDICINE

## 2023-08-08 PROCEDURE — 36415 COLL VENOUS BLD VENIPUNCTURE: CPT | Performed by: FAMILY MEDICINE

## 2023-08-08 PROCEDURE — 83036 HEMOGLOBIN GLYCOSYLATED A1C: CPT | Performed by: FAMILY MEDICINE

## 2023-08-08 PROCEDURE — 90471 IMMUNIZATION ADMIN: CPT | Performed by: FAMILY MEDICINE

## 2023-08-08 PROCEDURE — 85027 COMPLETE CBC AUTOMATED: CPT | Performed by: FAMILY MEDICINE

## 2023-08-08 PROCEDURE — 93000 ELECTROCARDIOGRAM COMPLETE: CPT | Performed by: FAMILY MEDICINE

## 2023-08-08 PROCEDURE — 90715 TDAP VACCINE 7 YRS/> IM: CPT | Performed by: FAMILY MEDICINE

## 2023-08-08 ASSESSMENT — PAIN SCALES - GENERAL: PAINLEVEL: EXTREME PAIN (8)

## 2023-08-08 NOTE — PROGRESS NOTES
[address] [address]  Phone: [phone]  Primary Provider: Nirmal Jalloh  Pre-op Performing Provider: NIRMAL JALLOH      PREOPERATIVE EVALUATION:  Today's date: 8/8/2023    Bill Smith is a 72 year old male who presents for a preoperative evaluation.      8/8/2023    12:32 PM   Additional Questions   Roomed by Yi   Accompanied by self       Surgical Information:  Surgery/Procedure: Arthroplasty, Total Knee  Surgery Location: [facility]  Surgeon: Miky Jarvis MD  Surgery Date: 08/11/2023  Time of Surgery: 10:25AM  Where patient plans to recover: At home with family  Fax number for surgical facility: [phone]    Assessment & Plan     The proposed surgical procedure is considered INTERMEDIATE risk.    Preop general physical exam  Medically cleared for anesthesia for proposed procedure.  - CBC with platelets; Future  - EKG 12-lead complete w/read - Clinics    Type 2 diabetes mellitus without complication, without long-term current use of insulin (H)  Hold metformin on morning of surgery. Restart after surgery.  - HEMOGLOBIN A1C; Future  - Basic metabolic panel  (Ca, Cl, CO2, Creat, Gluc, K, Na, BUN); Future    Essential hypertension with goal blood pressure less than 140/90  Controlled. Continue with current medications.  - Basic metabolic panel  (Ca, Cl, CO2, Creat, Gluc, K, Na, BUN); Future    Need for diphtheria-tetanus-pertussis (Tdap) vaccine    - TDAP 10-64Y (ADACEL,BOOSTRIX)          - No identified additional risk factors other than previously addressed    Antiplatelet or Anticoagulation Medication Instructions:   - Patient is on no antiplatelet or anticoagulation medications.    Additional Medication Instructions:   - metformin: HOLD day of surgery.    RECOMMENDATION:  APPROVAL GIVEN to proceed with proposed procedure, without further diagnostic evaluation.        Subjective       HPI related to upcoming procedure: right knee  osteoarthritis        8/1/2023     8:56 AM   Preop Questions   1. Have you ever had a heart attack or stroke? No   2. Have you ever had surgery on your heart or blood vessels, such as a stent placement, a coronary artery bypass, or surgery on an artery in your head, neck, heart, or legs? No   3. Do you have chest pain with activity? No   4. Do you have a history of  heart failure? No   5. Do you currently have a cold, bronchitis or symptoms of other infection? No   6. Do you have a cough, shortness of breath, or wheezing? No   7. Do you or anyone in your family have previous history of blood clots? No   8. Do you or does anyone in your family have a serious bleeding problem such as prolonged bleeding following surgeries or cuts? No   9. Have you ever had problems with anemia or been told to take iron pills? No   10. Have you had any abnormal blood loss such as black, tarry or bloody stools? No   11. Have you ever had a blood transfusion? No   12. Are you willing to have a blood transfusion if it is medically needed before, during, or after your surgery? Yes   13. Have you or any of your relatives ever had problems with anesthesia? No   14. Do you have sleep apnea, excessive snoring or daytime drowsiness? YES   14a. Do you have a CPAP machine? No   15. Do you have any artifical heart valves or other implanted medical devices like a pacemaker, defibrillator, or continuous glucose monitor? No   16. Do you have artificial joints? No   17. Are you allergic to latex? No       Health Care Directive:  Patient does not have a Health Care Directive or Living Will: Discussed advance care planning with patient; however, patient declined at this time.    Preoperative Review of :   reviewed - no record of controlled substances prescribed.      Review of Systems  CONSTITUTIONAL: NEGATIVE for fever, chills, change in weight  INTEGUMENTARY/SKIN: NEGATIVE for worrisome rashes, moles or lesions  EYES: NEGATIVE for vision changes  or irritation  ENT/MOUTH: NEGATIVE for ear, mouth and throat problems  RESP: NEGATIVE for significant cough or SOB  CV: NEGATIVE for chest pain, palpitations or peripheral edema  GI: NEGATIVE for nausea, abdominal pain, heartburn, or change in bowel habits  : NEGATIVE for frequency, dysuria, or hematuria  MUSCULOSKELETAL: NEGATIVE for significant arthralgias or myalgia  NEURO: NEGATIVE for weakness, dizziness or paresthesias  ENDOCRINE: NEGATIVE for temperature intolerance, skin/hair changes  HEME: NEGATIVE for bleeding problems  PSYCHIATRIC: NEGATIVE for changes in mood or affect    Patient Active Problem List    Diagnosis Date Noted    Diabetes mellitus, type 2 (H) 07/25/2023     Priority: Medium    Complex tear of medial meniscus of right knee as current injury, subsequent encounter 06/09/2021     Priority: Medium     Added automatically from request for surgery [id number]      Chondromalacia of right knee 06/09/2021     Priority: Medium     Added automatically from request for surgery [id number]      Prediabetes 05/29/2019     Priority: Medium    Essential hypertension with goal blood pressure less than 140/90 12/05/2018     Priority: Medium    Idiopathic chronic gout without tophus, unspecified site 12/05/2018     Priority: Medium    Advanced directives, counseling/discussion 03/29/2012     Priority: Medium     Patient states has Advance Directive and will bring in a copy to clinic. 3/29/2012  SAL Crouch           Insomnia 08/26/2011     Priority: Medium    Anxiety 08/26/2011     Priority: Medium    CARDIOVASCULAR SCREENING; LDL GOAL LESS THAN 130 10/31/2010     Priority: Medium    History of hernia repair      Priority: Medium    Parathyroid adenoma      Priority: Medium     parathyroidectomy 04/2007      Gout      Priority: Medium      Past Medical History:   Diagnosis Date    Acute medial meniscus tear of right knee     Arthritis     Chronic infection     Diabetes mellitus, type 2 (H) 07/25/2023     Essential tremor     hands    Gout     History of hernia repair     HTN     Idiopathic chronic gout without tophus, unspecified site 12/05/2018    Parathyroid adenoma 2007    parathyroidectomy 04/2007    Paresthesia of right leg     Partial arterial retinal occlusion     right eye    Sleep apnea     no CPAP     Past Surgical History:   Procedure Laterality Date    ABDOMEN SURGERY      Hernia repair left and right side    ARTHROSCOPY KNEE WITH MEDIAL MENISCECTOMY Right 06/30/2021    Procedure: ARTHROSCOPY, RIGHT KNEE, WITH MEDIAL MENISCECTOMY, MAJOR CHONDROPLASTY.;  Surgeon: Jens Riley MD;  Location: [facility] OR    COLONOSCOPY      HERNIA REPAIR      LAMINECT/DISCECTOMY, CERVICAL  01/01/1999    PARATHYROIDECTOMY  2007    partial    ZZC APPENDECTOMY       Current Outpatient Medications   Medication Sig Dispense Refill    acetaminophen (TYLENOL) 325 MG tablet Take 650 mg by mouth every 6 hours as needed for mild pain      allopurinol (ZYLOPRIM) 100 MG tablet TAKE 1 TABLET BY MOUTH TWICE DAILY FOR  GOUT 180 tablet 3    ascorbic acid (VITAMIN C) 1000 MG TABS Take 1 tablet by mouth daily.      carvedilol (COREG) 25 MG tablet TAKE 1 TABLET BY MOUTH TWICE DAILY WITH MEALS FOR BLOOD PRESSURE 180 tablet 3    chlorthalidone (HYGROTON) 25 MG tablet Take 1 tablet (25 mg) by mouth every morning For blood pressure. 90 tablet 3    fluticasone (FLONASE) 50 MCG/ACT nasal spray Use 2 spray(s) in each nostril once daily (Patient taking differently: Spray 1 spray into both nostrils daily as needed for allergies) 48 g 1    hydrALAZINE (APRESOLINE) 50 MG tablet TAKE 1 TABLET BY MOUTH THREE TIMES DAILY FOR BLOOD PRESSURE 270 tablet 3    indomethacin (INDOCIN) 50 MG capsule Take 1 capsule (50 mg) by mouth 3 times daily as needed for moderate pain 60 capsule 3    losartan (COZAAR) 100 MG tablet Take 1 tablet by mouth once daily for blood pressure 90 tablet 3    metFORMIN (GLUCOPHAGE) 500 MG tablet TAKE 1 TABLET BY MOUTH TWICE DAILY WITH  "MEALS FOR DIABETES 180 tablet 0    MULTIPLE VITAMIN PO Take 1 tablet by mouth daily Stopping 8/4/23 before surgery      primidone (MYSOLINE) 50 MG tablet Take 50 mg in the morning and 100 mg in the evening. 270 tablet 1       Allergies   Allergen Reactions    Berm[place] Grass Extract Other (See Comments)     Coughing sneezing    Cats     Grass Pollen(K-O-R-T-Swt Sourav) Cough    Lisinopril Cough    Molds & Smuts Other (See Comments)     Coughing sneezing  Coughing sneezing      Shellfish Allergy Other (See Comments)     Causes gout        Social History     Tobacco Use    Smoking status: Never    Smokeless tobacco: Never   Substance Use Topics    Alcohol use: Not Currently     Comment: rarely     Family History   Problem Relation Age of Onset    Prostate Cancer Father     Alzheimer Disease Paternal Grandmother     Hypertension Maternal Uncle         AGE 60'S     History   Drug Use No         Objective     /80 (BP Location: Left arm, Patient Position: Sitting, Cuff Size: Adult Regular)   Pulse 87   Temp 98.6  F (37  C) (Oral)   Resp 15   Ht 1.854 m (6' 1\")   Wt 109.5 kg (241 lb 6.4 oz)   SpO2 95%   BMI 31.85 kg/m      Physical Exam    GENERAL APPEARANCE: healthy, alert and no distress     EYES: EOMI,  PERRL     HENT: ear canals and TM's normal and nose and mouth without ulcers or lesions     NECK: no adenopathy, no asymmetry, masses, or scars and thyroid normal to palpation     RESP: lungs clear to auscultation - no rales, rhonchi or wheezes     CV: regular rates and rhythm, normal S1 S2, no S3 or S4 and no murmur, click or rub     ABDOMEN:  soft, nontender, no HSM or masses and bowel sounds normal     MS: extremities normal- no gross deformities noted, no evidence of inflammation in joints, FROM in all extremities.     SKIN: no suspicious lesions or rashes     NEURO: Normal strength and tone, sensory exam grossly normal, mentation intact and speech normal     PSYCH: mentation appears normal. and affect " normal/bright     LYMPHATICS: No cervical adenopathy    Recent Labs   Lab Test 10/06/22  1251 10/26/21  1259     --    POTASSIUM 3.5  --    CR 0.77  --    A1C 6.7* 6.6*        Diagnostics:  CBC, BMP and A1c pending.   EKG: appears normal, NSR, normal axis, normal intervals, no acute ST/T changes c/w ischemia, no LVH by voltage criteria, incomplete right bundle branch block, unchanged from previous tracings    Revised Cardiac Risk Index (RCRI):  The patient has the following serious cardiovascular risks for perioperative complications:   - No serious cardiac risks = 0 points     RCRI Interpretation: 0 points: Class I (very low risk - 0.4% complication rate)         Signed Electronically by: Nirmal Lubin MD  Copy of this evaluation report is provided to requesting physician.

## 2023-08-08 NOTE — NURSING NOTE
Prior to immunization administration, verified patients identity using patient s name and date of birth. Please see Immunization Activity for additional information.     Screening Questionnaire for Adult Immunization    Are you sick today?   No   Do you have allergies to medications, food, a vaccine component or latex?   No   Have you ever had a serious reaction after receiving a vaccination?   No   Do you have a long-term health problem with heart, lung, kidney, or metabolic disease (e.g., diabetes), asthma, a blood disorder, no spleen, complement component deficiency, a cochlear implant, or a spinal fluid leak?  Are you on long-term aspirin therapy?   Yes   Do you have cancer, leukemia, HIV/AIDS, or any other immune system problem?   No   Do you have a parent, brother, or sister with an immune system problem?   No   In the past 3 months, have you taken medications that affect  your immune system, such as prednisone, other steroids, or anticancer drugs; drugs for the treatment of rheumatoid arthritis, Crohn s disease, or psoriasis; or have you had radiation treatments?   No   Have you had a seizure, or a brain or other nervous system problem?   No   During the past year, have you received a transfusion of blood or blood    products, or been given immune (gamma) globulin or antiviral drug?   No   For women: Are you pregnant or is there a chance you could become       pregnant during the next month?   No   Have you received any vaccinations in the past 4 weeks?   No     Immunization questionnaire was positive for at least one answer.  Notified MD Lubin.      Patient instructed to remain in clinic for 15 minutes afterwards, and to report any adverse reactions.     Screening performed by Yi Mcmullen MA on 8/8/2023 at 1:13 PM.

## 2023-08-09 LAB
ANION GAP SERPL CALCULATED.3IONS-SCNC: 12 MMOL/L (ref 7–15)
BUN SERPL-MCNC: 10.3 MG/DL (ref 8–23)
CALCIUM SERPL-MCNC: 9.3 MG/DL (ref 8.8–10.2)
CHLORIDE SERPL-SCNC: 104 MMOL/L (ref 98–107)
CREAT SERPL-MCNC: 0.77 MG/DL (ref 0.67–1.17)
DEPRECATED HCO3 PLAS-SCNC: 26 MMOL/L (ref 22–29)
GFR SERPL CREATININE-BSD FRML MDRD: >90 ML/MIN/1.73M2
GLUCOSE SERPL-MCNC: 144 MG/DL (ref 70–99)
POTASSIUM SERPL-SCNC: 3.3 MMOL/L (ref 3.4–5.3)
SODIUM SERPL-SCNC: 142 MMOL/L (ref 136–145)

## 2023-08-09 NOTE — TREATMENT PLAN
Orthopedic Surgery Pre-Op Plan: Bill Smith  pre-op review. This is NOT an H&P   Surgeon: Dr. Jarvis    [facility]  Name of Surgery: Right Total Knee Arthroplasty   Date of Surgery: 8/1/23  H&P: Completed on 8/8/23 by Dr. Nirmal Lubin at [facility].   History of ASA, NSAIDS, vitamin and/or herbal supplements within 10 days: Yes- Indomethacin, Multivitamin- patient instructed to hold these medications/vitamins for 7 days before surgery.   History of blood thinners: No    Plan:   1) Discharge Plan: Home morning of POD 1 with assist of wife, Brooke. Please see Discharge Planning section near bottom of this note for further details.     2) Essential Tremor: bilateral hands, left worse than right. Follows with Neurology. Reviewed recent Neurology visit note with Dr. Rivera on 7/26/23: increasing primidone dose to see if this improves symptoms. Will follow up with Neurology again in 4 months.    3) Right Leg Paresthesias: follows with Neurology. 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 Patient wants to wait and see how right knee surgery goes. May consider EMG to clarify this further after recovery from knee surgery.     4) Hypertension: appears well-controlled on carvedilol, chlorthalidone, hydralazine and losartan. Patient instructed to hold chlorthalidone and and losartan on the morning of surgery but to continue taking carvedilol and hydralazine.     5) Sleep Apnea: does not have CPAP. I recommend close monitoring of post-op respiratory status. If frequent O2 de-sats or apneic episodes, nursing to please notify Hospitalist.     6) Type 2 Diabetes Mellitus: Good control. Hemoglobin A1C 6.8 on 8/8/23.  on 8/23. On metformin. I recommend blood glucose checks at least three times a day and at bedtime during hospital stay. Goal BG < 180 to decrease risk for infection and wound healing complications. Nursing to please notify Hospitalist if BG > 180.      7)  Gout: on allopurinol.     8) Hypokalemia: Mild: Potassium 3.3 on 8/8/23. PCP addressed this and felt it is probably due to being on chlorthalidone. PCP encouraged patient to increase potassium in diet. Patient should also hold chlorthalidone on the morning of surgery. Will recheck potassium level in preop and can monitor this post-op if needed.     Patient appears medically optimized for upcoming surgery. I would recommend Hospitalist Consult to assist with medical management. Please call me below with any questions on this patient.     Review of Systems Notable for: Essential Tremor-bilateral hands, Right Leg Paresthesias, Hypertension, Sleep Apnea-does not have CPAP, Type 2 Diabetes Mellitus, Gout, Hypokalemia.     Past Medical History:   Past Medical History:   Diagnosis Date    Acute medial meniscus tear of right knee     Arthritis     Chronic infection     Diabetes mellitus, type 2 (H) 07/25/2023    Essential tremor     hands    Gout     History of hernia repair     HTN     Idiopathic chronic gout without tophus, unspecified site 12/05/2018    Parathyroid adenoma 2007    parathyroidectomy 04/2007    Paresthesia of right leg     Partial arterial retinal occlusion     right eye    Sleep apnea     no CPAP     Past Surgical History:   Procedure Laterality Date    ABDOMEN SURGERY      Hernia repair left and right side    ARTHROSCOPY KNEE WITH MEDIAL MENISCECTOMY Right 06/30/2021    Procedure: ARTHROSCOPY, RIGHT KNEE, WITH MEDIAL MENISCECTOMY, MAJOR CHONDROPLASTY.;  Surgeon: Jens Riley MD;  Location: [facility] OR    COLONOSCOPY      HERNIA REPAIR      LAMINECT/DISCECTOMY, CERVICAL  01/01/1999    PARATHYROIDECTOMY  2007    partial    ZZC APPENDECTOMY         Current Medications:  Patient's Medications   New Prescriptions    No medications on file   Previous Medications    ACETAMINOPHEN (TYLENOL) 325 MG TABLET    Take 650 mg by mouth every 6 hours as needed for mild pain    ALLOPURINOL (ZYLOPRIM) 100 MG TABLET     TAKE 1 TABLET BY MOUTH TWICE DAILY FOR  GOUT    ASCORBIC ACID (VITAMIN C) 1000 MG TABS    Take 1 tablet by mouth daily.    BEVACIZUMAB (AVASTIN) 400 MG/16ML INJECTION    Inject 25 mg into the vein every 30 days    CARVEDILOL (COREG) 25 MG TABLET    TAKE 1 TABLET BY MOUTH TWICE DAILY WITH MEALS FOR BLOOD PRESSURE    CHLORTHALIDONE (HYGROTON) 25 MG TABLET    Take 1 tablet (25 mg) by mouth every morning For blood pressure.    FLUTICASONE (FLONASE) 50 MCG/ACT NASAL SPRAY    Use 2 spray(s) in each nostril once daily    HYDRALAZINE (APRESOLINE) 50 MG TABLET    TAKE 1 TABLET BY MOUTH THREE TIMES DAILY FOR BLOOD PRESSURE    INDOMETHACIN (INDOCIN) 50 MG CAPSULE    Take 1 capsule (50 mg) by mouth 3 times daily as needed for moderate pain    LOSARTAN (COZAAR) 100 MG TABLET    Take 1 tablet by mouth once daily for blood pressure    METFORMIN (GLUCOPHAGE) 500 MG TABLET    TAKE 1 TABLET BY MOUTH TWICE DAILY WITH MEALS FOR DIABETES    MULTIPLE VITAMIN PO    Take 1 tablet by mouth daily Stopping 8/4/23 before surgery    PRIMIDONE (MYSOLINE) 50 MG TABLET    Take 50 mg in the morning and 100 mg in the evening.   Modified Medications    No medications on file   Discontinued Medications    ERGOCALCIFEROL (VITAMIN D2) 400 UNITS (10 MCG) TABS TABLET    Take 400 Units by mouth daily       ALLERGIES:  Allergies   Allergen Reactions    [place] Grass Extract Other (See Comments)     Coughing sneezing    Cats     Grass Pollen(K-O-R-T-Swt Sourav) Cough    Lisinopril Cough    Molds & Smuts Other (See Comments)     Coughing sneezing  Coughing sneezing      Shellfish Allergy Other (See Comments)     Causes gout       Social History  Social History     Tobacco Use    Smoking status: Never    Smokeless tobacco: Never   Vaping Use    Vaping Use: Never used   Substance Use Topics    Alcohol use: Not Currently     Comment: rarely    Drug use: No       Any Abnormal Recent Diagnostics? Yes  Potassium 3.3 on 8/8/23: mild hypokalemia. PCP addressed this-  likely due to being on chlorthalidone. PCP encouraged patient to increase potassium in diet. We will recheck potassium on day of surgery and can monitor post-op as needed.   Hemoglobin A1C 6.8 on 8/8/23: shows good recent control of Type 2 Diabetes on metformin.   Blood Glucose 144 on 8/8/23: we will monitor BG's closely during hospital stay. Goal BG < 180.     Discharge Planning:   Planning to discharge home on POD 1 in the morning with his wife helping him.        07/29/23 0106   Discharge Planning   Patient/Family Anticipates Transition to home;home with family  (outpatient PT arranged at [facility])   Barriers to Discharge  --    Comment, Barriers to Discharge  --    Concerns to be Addressed all concerns addressed in this encounter   Living Arrangements   People in Home spouse   Type of Residence Private Residence   Is your private residence a single family home or apartment? Single family home   Number of Stairs, Within Home, Primary greater than 10 stairs   Stair Railings, Within Home, Primary railings safe and in good condition   Once home, are you able to live on one level? Yes  (if sleeps on couch)   Which level? Main Level   Bathroom Shower/Tub Walk-in shower   Equipment Currently Used at Home cane, straight;crutches;grab bar, tub/shower;shower chair;dressing device  (Has a shoe horn)   Support System   Support Systems Spouse/Significant Other  (wife, Brooke)       RAVI Wade, CNP   Advanced Practice Nurse Navigator- Orthopedics  [facility]   Phone: [phone]

## 2023-08-10 ENCOUNTER — ANESTHESIA EVENT (OUTPATIENT)
Dept: SURGERY | Facility: CLINIC | Age: 73
End: 2023-08-10
Payer: MEDICARE

## 2023-08-11 ENCOUNTER — HOSPITAL ENCOUNTER (OUTPATIENT)
Facility: CLINIC | Age: 73
Discharge: HOME | End: 2023-08-12
Attending: ORTHOPAEDIC SURGERY | Admitting: ORTHOPAEDIC SURGERY
Payer: MEDICARE

## 2023-08-11 ENCOUNTER — APPOINTMENT (OUTPATIENT)
Dept: PHYSICAL THERAPY | Facility: CLINIC | Age: 73
End: 2023-08-11
Attending: ORTHOPAEDIC SURGERY
Payer: MEDICARE

## 2023-08-11 ENCOUNTER — APPOINTMENT (OUTPATIENT)
Dept: RADIOLOGY | Facility: CLINIC | Age: 73
End: 2023-08-11
Attending: PHYSICIAN ASSISTANT
Payer: MEDICARE

## 2023-08-11 ENCOUNTER — ANESTHESIA (OUTPATIENT)
Dept: SURGERY | Facility: CLINIC | Age: 73
End: 2023-08-11
Payer: MEDICARE

## 2023-08-11 DIAGNOSIS — I10 ESSENTIAL HYPERTENSION WITH GOAL BLOOD PRESSURE LESS THAN 140/90: ICD-10-CM

## 2023-08-11 DIAGNOSIS — Z96.651 S/P TOTAL KNEE ARTHROPLASTY, RIGHT: Primary | ICD-10-CM

## 2023-08-11 LAB
GLUCOSE BLDC GLUCOMTR-MCNC: 165 MG/DL (ref 70–99)
GLUCOSE BLDC GLUCOMTR-MCNC: 194 MG/DL (ref 70–99)
GLUCOSE BLDC GLUCOMTR-MCNC: 263 MG/DL (ref 70–99)
GLUCOSE BLDC GLUCOMTR-MCNC: 276 MG/DL (ref 70–99)
POTASSIUM BLD-SCNC: 3.2 MMOL/L (ref 3.5–5)

## 2023-08-11 PROCEDURE — 84132 ASSAY OF SERUM POTASSIUM: CPT | Performed by: NURSE PRACTITIONER

## 2023-08-11 PROCEDURE — 360N000077 HC SURGERY LEVEL 4, PER MIN: Performed by: ORTHOPAEDIC SURGERY

## 2023-08-11 PROCEDURE — 258N000003 HC RX IP 258 OP 636

## 2023-08-11 PROCEDURE — 250N000011 HC RX IP 250 OP 636

## 2023-08-11 PROCEDURE — 250N000011 HC RX IP 250 OP 636: Mod: JZ | Performed by: NURSE ANESTHETIST, CERTIFIED REGISTERED

## 2023-08-11 PROCEDURE — 999N000141 HC STATISTIC PRE-PROCEDURE NURSING ASSESSMENT: Performed by: ORTHOPAEDIC SURGERY

## 2023-08-11 PROCEDURE — 250N000011 HC RX IP 250 OP 636: Mod: JZ | Performed by: PHYSICIAN ASSISTANT

## 2023-08-11 PROCEDURE — 97161 PT EVAL LOW COMPLEX 20 MIN: CPT | Mod: GP

## 2023-08-11 PROCEDURE — 82962 GLUCOSE BLOOD TEST: CPT

## 2023-08-11 PROCEDURE — 370N000017 HC ANESTHESIA TECHNICAL FEE, PER MIN: Performed by: ORTHOPAEDIC SURGERY

## 2023-08-11 PROCEDURE — 250N000011 HC RX IP 250 OP 636: Performed by: ANESTHESIOLOGY

## 2023-08-11 PROCEDURE — 27447 TOTAL KNEE ARTHROPLASTY: CPT | Mod: RT | Performed by: ORTHOPAEDIC SURGERY

## 2023-08-11 PROCEDURE — C1776 JOINT DEVICE (IMPLANTABLE): HCPCS | Performed by: ORTHOPAEDIC SURGERY

## 2023-08-11 PROCEDURE — 97530 THERAPEUTIC ACTIVITIES: CPT | Mod: GP

## 2023-08-11 PROCEDURE — C1713 ANCHOR/SCREW BN/BN,TIS/BN: HCPCS | Performed by: ORTHOPAEDIC SURGERY

## 2023-08-11 PROCEDURE — 999N000065 XR KNEE PORT RIGHT 1/2 VIEWS: Mod: RT

## 2023-08-11 PROCEDURE — 258N000003 HC RX IP 258 OP 636: Performed by: PHYSICIAN ASSISTANT

## 2023-08-11 PROCEDURE — 272N000001 HC OR GENERAL SUPPLY STERILE: Performed by: ORTHOPAEDIC SURGERY

## 2023-08-11 PROCEDURE — 710N000010 HC RECOVERY PHASE 1, LEVEL 2, PER MIN: Performed by: ORTHOPAEDIC SURGERY

## 2023-08-11 PROCEDURE — 99204 OFFICE O/P NEW MOD 45 MIN: CPT | Performed by: STUDENT IN AN ORGANIZED HEALTH CARE EDUCATION/TRAINING PROGRAM

## 2023-08-11 PROCEDURE — 36415 COLL VENOUS BLD VENIPUNCTURE: CPT | Performed by: NURSE PRACTITIONER

## 2023-08-11 PROCEDURE — 250N000013 HC RX MED GY IP 250 OP 250 PS 637: Performed by: STUDENT IN AN ORGANIZED HEALTH CARE EDUCATION/TRAINING PROGRAM

## 2023-08-11 PROCEDURE — 250N000013 HC RX MED GY IP 250 OP 250 PS 637: Performed by: PHYSICIAN ASSISTANT

## 2023-08-11 PROCEDURE — 258N000003 HC RX IP 258 OP 636: Performed by: NURSE ANESTHETIST, CERTIFIED REGISTERED

## 2023-08-11 PROCEDURE — 250N000009 HC RX 250: Performed by: NURSE ANESTHETIST, CERTIFIED REGISTERED

## 2023-08-11 PROCEDURE — 258N000003 HC RX IP 258 OP 636: Performed by: ANESTHESIOLOGY

## 2023-08-11 PROCEDURE — 250N000013 HC RX MED GY IP 250 OP 250 PS 637

## 2023-08-11 PROCEDURE — 250N000012 HC RX MED GY IP 250 OP 636 PS 637: Performed by: STUDENT IN AN ORGANIZED HEALTH CARE EDUCATION/TRAINING PROGRAM

## 2023-08-11 PROCEDURE — 258N000001 HC RX 258: Performed by: ORTHOPAEDIC SURGERY

## 2023-08-11 DEVICE — GENESIS II NON POROUS CRUCIATE                                    RETAINING FEMORAL SIZE 6 RIGHT
Type: IMPLANTABLE DEVICE | Site: KNEE | Status: FUNCTIONAL
Brand: GENESIS II

## 2023-08-11 DEVICE — LEGION CR HIGH FLEX XLPE SZ 5-6 9MM
Type: IMPLANTABLE DEVICE | Site: KNEE | Status: FUNCTIONAL
Brand: LEGION

## 2023-08-11 DEVICE — GENESIS II NON-POROUS TIBIAL                                    BASEPLATE SIZE 5 RIGHT
Type: IMPLANTABLE DEVICE | Site: KNEE | Status: FUNCTIONAL
Brand: GENESIS II

## 2023-08-11 DEVICE — BONE CEMENT SIMPLEX W/TOBRAMYCIN 6197-9-001: Type: IMPLANTABLE DEVICE | Site: KNEE | Status: FUNCTIONAL

## 2023-08-11 RX ORDER — CARVEDILOL 12.5 MG/1
25 TABLET ORAL 2 TIMES DAILY WITH MEALS
Status: DISCONTINUED | OUTPATIENT
Start: 2023-08-11 | End: 2023-08-12 | Stop reason: HOSPADM

## 2023-08-11 RX ORDER — HYDROMORPHONE HCL IN WATER/PF 6 MG/30 ML
0.4 PATIENT CONTROLLED ANALGESIA SYRINGE INTRAVENOUS EVERY 5 MIN PRN
Status: DISCONTINUED | OUTPATIENT
Start: 2023-08-11 | End: 2023-08-11 | Stop reason: HOSPADM

## 2023-08-11 RX ORDER — FENTANYL CITRATE 50 UG/ML
25-100 INJECTION, SOLUTION INTRAMUSCULAR; INTRAVENOUS
Status: DISCONTINUED | OUTPATIENT
Start: 2023-08-11 | End: 2023-08-11 | Stop reason: HOSPADM

## 2023-08-11 RX ORDER — NALOXONE HYDROCHLORIDE 0.4 MG/ML
0.2 INJECTION, SOLUTION INTRAMUSCULAR; INTRAVENOUS; SUBCUTANEOUS
Status: DISCONTINUED | OUTPATIENT
Start: 2023-08-11 | End: 2023-08-12 | Stop reason: HOSPADM

## 2023-08-11 RX ORDER — DEXTROSE MONOHYDRATE 25 G/50ML
25-50 INJECTION, SOLUTION INTRAVENOUS
Status: DISCONTINUED | OUTPATIENT
Start: 2023-08-11 | End: 2023-08-12 | Stop reason: HOSPADM

## 2023-08-11 RX ORDER — AMOXICILLIN 250 MG
1 CAPSULE ORAL 2 TIMES DAILY
Status: DISCONTINUED | OUTPATIENT
Start: 2023-08-11 | End: 2023-08-12 | Stop reason: HOSPADM

## 2023-08-11 RX ORDER — DEXAMETHASONE SODIUM PHOSPHATE 10 MG/ML
INJECTION, SOLUTION INTRAMUSCULAR; INTRAVENOUS PRN
Status: DISCONTINUED | OUTPATIENT
Start: 2023-08-11 | End: 2023-08-11

## 2023-08-11 RX ORDER — SODIUM CHLORIDE, SODIUM LACTATE, POTASSIUM CHLORIDE, CALCIUM CHLORIDE 600; 310; 30; 20 MG/100ML; MG/100ML; MG/100ML; MG/100ML
INJECTION, SOLUTION INTRAVENOUS CONTINUOUS
Status: DISCONTINUED | OUTPATIENT
Start: 2023-08-11 | End: 2023-08-12 | Stop reason: HOSPADM

## 2023-08-11 RX ORDER — TRANEXAMIC ACID 650 MG/1
1950 TABLET ORAL ONCE
Status: COMPLETED | OUTPATIENT
Start: 2023-08-11 | End: 2023-08-11

## 2023-08-11 RX ORDER — FENTANYL CITRATE 50 UG/ML
50 INJECTION, SOLUTION INTRAMUSCULAR; INTRAVENOUS EVERY 5 MIN PRN
Status: DISCONTINUED | OUTPATIENT
Start: 2023-08-11 | End: 2023-08-11 | Stop reason: HOSPADM

## 2023-08-11 RX ORDER — ONDANSETRON 2 MG/ML
4 INJECTION INTRAMUSCULAR; INTRAVENOUS EVERY 30 MIN PRN
Status: DISCONTINUED | OUTPATIENT
Start: 2023-08-11 | End: 2023-08-11 | Stop reason: HOSPADM

## 2023-08-11 RX ORDER — NICOTINE POLACRILEX 4 MG
15-30 LOZENGE BUCCAL
Status: DISCONTINUED | OUTPATIENT
Start: 2023-08-11 | End: 2023-08-12 | Stop reason: HOSPADM

## 2023-08-11 RX ORDER — MAGNESIUM SULFATE 4 G/50ML
4 INJECTION INTRAVENOUS ONCE
Status: COMPLETED | OUTPATIENT
Start: 2023-08-11 | End: 2023-08-11

## 2023-08-11 RX ORDER — EPHEDRINE SULFATE 50 MG/ML
INJECTION, SOLUTION INTRAMUSCULAR; INTRAVENOUS; SUBCUTANEOUS PRN
Status: DISCONTINUED | OUTPATIENT
Start: 2023-08-11 | End: 2023-08-11

## 2023-08-11 RX ORDER — ACETAMINOPHEN 325 MG/1
975 TABLET ORAL ONCE
Status: COMPLETED | OUTPATIENT
Start: 2023-08-11 | End: 2023-08-11

## 2023-08-11 RX ORDER — LIDOCAINE HYDROCHLORIDE 10 MG/ML
INJECTION, SOLUTION INFILTRATION; PERINEURAL PRN
Status: DISCONTINUED | OUTPATIENT
Start: 2023-08-11 | End: 2023-08-11

## 2023-08-11 RX ORDER — ONDANSETRON 4 MG/1
4 TABLET, ORALLY DISINTEGRATING ORAL EVERY 30 MIN PRN
Status: DISCONTINUED | OUTPATIENT
Start: 2023-08-11 | End: 2023-08-11 | Stop reason: HOSPADM

## 2023-08-11 RX ORDER — CEFAZOLIN SODIUM/WATER 2 G/20 ML
2 SYRINGE (ML) INTRAVENOUS
Status: COMPLETED | OUTPATIENT
Start: 2023-08-11 | End: 2023-08-11

## 2023-08-11 RX ORDER — NALOXONE HYDROCHLORIDE 0.4 MG/ML
0.4 INJECTION, SOLUTION INTRAMUSCULAR; INTRAVENOUS; SUBCUTANEOUS
Status: DISCONTINUED | OUTPATIENT
Start: 2023-08-11 | End: 2023-08-12 | Stop reason: HOSPADM

## 2023-08-11 RX ORDER — HYDROMORPHONE HCL IN WATER/PF 6 MG/30 ML
0.2 PATIENT CONTROLLED ANALGESIA SYRINGE INTRAVENOUS
Status: DISCONTINUED | OUTPATIENT
Start: 2023-08-11 | End: 2023-08-12 | Stop reason: HOSPADM

## 2023-08-11 RX ORDER — HYDRALAZINE HYDROCHLORIDE 50 MG/1
50 TABLET, FILM COATED ORAL 2 TIMES DAILY
Status: DISCONTINUED | OUTPATIENT
Start: 2023-08-11 | End: 2023-08-12 | Stop reason: HOSPADM

## 2023-08-11 RX ORDER — HYDROMORPHONE HCL IN WATER/PF 6 MG/30 ML
0.2 PATIENT CONTROLLED ANALGESIA SYRINGE INTRAVENOUS EVERY 5 MIN PRN
Status: DISCONTINUED | OUTPATIENT
Start: 2023-08-11 | End: 2023-08-11 | Stop reason: HOSPADM

## 2023-08-11 RX ORDER — ONDANSETRON 2 MG/ML
4 INJECTION INTRAMUSCULAR; INTRAVENOUS ONCE
Status: COMPLETED | OUTPATIENT
Start: 2023-08-11 | End: 2023-08-11

## 2023-08-11 RX ORDER — BISACODYL 10 MG
10 SUPPOSITORY, RECTAL RECTAL DAILY PRN
Status: DISCONTINUED | OUTPATIENT
Start: 2023-08-11 | End: 2023-08-12 | Stop reason: HOSPADM

## 2023-08-11 RX ORDER — OXYCODONE HYDROCHLORIDE 5 MG/1
5 TABLET ORAL EVERY 4 HOURS PRN
Status: DISCONTINUED | OUTPATIENT
Start: 2023-08-11 | End: 2023-08-12 | Stop reason: HOSPADM

## 2023-08-11 RX ORDER — HYDROMORPHONE HCL IN WATER/PF 6 MG/30 ML
0.4 PATIENT CONTROLLED ANALGESIA SYRINGE INTRAVENOUS
Status: DISCONTINUED | OUTPATIENT
Start: 2023-08-11 | End: 2023-08-12 | Stop reason: HOSPADM

## 2023-08-11 RX ORDER — ACETAMINOPHEN 325 MG/1
975 TABLET ORAL EVERY 8 HOURS
Status: DISCONTINUED | OUTPATIENT
Start: 2023-08-11 | End: 2023-08-12 | Stop reason: HOSPADM

## 2023-08-11 RX ORDER — ALLOPURINOL 100 MG/1
100 TABLET ORAL 2 TIMES DAILY
Status: DISCONTINUED | OUTPATIENT
Start: 2023-08-11 | End: 2023-08-12 | Stop reason: HOSPADM

## 2023-08-11 RX ORDER — SODIUM CHLORIDE, SODIUM LACTATE, POTASSIUM CHLORIDE, CALCIUM CHLORIDE 600; 310; 30; 20 MG/100ML; MG/100ML; MG/100ML; MG/100ML
INJECTION, SOLUTION INTRAVENOUS CONTINUOUS
Status: DISCONTINUED | OUTPATIENT
Start: 2023-08-11 | End: 2023-08-11 | Stop reason: HOSPADM

## 2023-08-11 RX ORDER — ACETAMINOPHEN 325 MG/1
975 TABLET ORAL ONCE
Status: DISCONTINUED | OUTPATIENT
Start: 2023-08-11 | End: 2023-08-11 | Stop reason: HOSPADM

## 2023-08-11 RX ORDER — KETOROLAC TROMETHAMINE 15 MG/ML
15 INJECTION, SOLUTION INTRAMUSCULAR; INTRAVENOUS EVERY 6 HOURS
Status: COMPLETED | OUTPATIENT
Start: 2023-08-11 | End: 2023-08-12

## 2023-08-11 RX ORDER — FLUTICASONE PROPIONATE 50 MCG
1 SPRAY, SUSPENSION (ML) NASAL DAILY PRN
COMMUNITY

## 2023-08-11 RX ORDER — PROPOFOL 10 MG/ML
INJECTION, EMULSION INTRAVENOUS CONTINUOUS PRN
Status: DISCONTINUED | OUTPATIENT
Start: 2023-08-11 | End: 2023-08-11

## 2023-08-11 RX ORDER — PROPOFOL 10 MG/ML
INJECTION, EMULSION INTRAVENOUS PRN
Status: DISCONTINUED | OUTPATIENT
Start: 2023-08-11 | End: 2023-08-11

## 2023-08-11 RX ORDER — PRIMIDONE 50 MG/1
100 TABLET ORAL AT BEDTIME
Status: DISCONTINUED | OUTPATIENT
Start: 2023-08-11 | End: 2023-08-12 | Stop reason: HOSPADM

## 2023-08-11 RX ORDER — PROCHLORPERAZINE MALEATE 5 MG
5 TABLET ORAL EVERY 6 HOURS PRN
Status: DISCONTINUED | OUTPATIENT
Start: 2023-08-11 | End: 2023-08-12 | Stop reason: HOSPADM

## 2023-08-11 RX ORDER — PHENYLEPHRINE HCL IN 0.9% NACL 50MG/250ML
PLASTIC BAG, INJECTION (ML) INTRAVENOUS CONTINUOUS PRN
Status: DISCONTINUED | OUTPATIENT
Start: 2023-08-11 | End: 2023-08-11

## 2023-08-11 RX ORDER — BUPIVACAINE HYDROCHLORIDE 5 MG/ML
INJECTION, SOLUTION EPIDURAL; INTRACAUDAL PRN
Status: DISCONTINUED | OUTPATIENT
Start: 2023-08-11 | End: 2023-08-11

## 2023-08-11 RX ORDER — CEFAZOLIN SODIUM/WATER 2 G/20 ML
2 SYRINGE (ML) INTRAVENOUS SEE ADMIN INSTRUCTIONS
Status: DISCONTINUED | OUTPATIENT
Start: 2023-08-11 | End: 2023-08-11 | Stop reason: HOSPADM

## 2023-08-11 RX ORDER — POLYETHYLENE GLYCOL 3350 17 G/17G
17 POWDER, FOR SOLUTION ORAL DAILY
Status: DISCONTINUED | OUTPATIENT
Start: 2023-08-12 | End: 2023-08-12 | Stop reason: HOSPADM

## 2023-08-11 RX ORDER — FENTANYL CITRATE 50 UG/ML
25 INJECTION, SOLUTION INTRAMUSCULAR; INTRAVENOUS EVERY 5 MIN PRN
Status: DISCONTINUED | OUTPATIENT
Start: 2023-08-11 | End: 2023-08-11 | Stop reason: HOSPADM

## 2023-08-11 RX ORDER — CEFAZOLIN SODIUM 2 G/100ML
2 INJECTION, SOLUTION INTRAVENOUS EVERY 8 HOURS
Status: COMPLETED | OUTPATIENT
Start: 2023-08-11 | End: 2023-08-12

## 2023-08-11 RX ORDER — PRIMIDONE 50 MG/1
50 TABLET ORAL EVERY MORNING
Status: DISCONTINUED | OUTPATIENT
Start: 2023-08-12 | End: 2023-08-12 | Stop reason: HOSPADM

## 2023-08-11 RX ORDER — LIDOCAINE 40 MG/G
CREAM TOPICAL
Status: DISCONTINUED | OUTPATIENT
Start: 2023-08-11 | End: 2023-08-11 | Stop reason: HOSPADM

## 2023-08-11 RX ORDER — LABETALOL HYDROCHLORIDE 5 MG/ML
5 INJECTION, SOLUTION INTRAVENOUS EVERY 10 MIN PRN
Status: DISCONTINUED | OUTPATIENT
Start: 2023-08-11 | End: 2023-08-11

## 2023-08-11 RX ORDER — TRAMADOL HYDROCHLORIDE 50 MG/1
50 TABLET ORAL EVERY 6 HOURS PRN
Status: CANCELLED | OUTPATIENT
Start: 2023-08-11

## 2023-08-11 RX ORDER — ONDANSETRON 2 MG/ML
4 INJECTION INTRAMUSCULAR; INTRAVENOUS EVERY 6 HOURS PRN
Status: DISCONTINUED | OUTPATIENT
Start: 2023-08-11 | End: 2023-08-12 | Stop reason: HOSPADM

## 2023-08-11 RX ORDER — ONDANSETRON 4 MG/1
4 TABLET, ORALLY DISINTEGRATING ORAL EVERY 6 HOURS PRN
Status: DISCONTINUED | OUTPATIENT
Start: 2023-08-11 | End: 2023-08-12 | Stop reason: HOSPADM

## 2023-08-11 RX ORDER — MAGNESIUM HYDROXIDE/ALUMINUM HYDROXICE/SIMETHICONE 120; 1200; 1200 MG/30ML; MG/30ML; MG/30ML
30 SUSPENSION ORAL EVERY 4 HOURS PRN
Status: DISCONTINUED | OUTPATIENT
Start: 2023-08-11 | End: 2023-08-12 | Stop reason: HOSPADM

## 2023-08-11 RX ORDER — LIDOCAINE 40 MG/G
CREAM TOPICAL
Status: DISCONTINUED | OUTPATIENT
Start: 2023-08-11 | End: 2023-08-12 | Stop reason: HOSPADM

## 2023-08-11 RX ORDER — HALOPERIDOL 5 MG/ML
1 INJECTION INTRAMUSCULAR
Status: DISCONTINUED | OUTPATIENT
Start: 2023-08-11 | End: 2023-08-11 | Stop reason: HOSPADM

## 2023-08-11 RX ORDER — HYDROXYZINE HYDROCHLORIDE 10 MG/1
10 TABLET, FILM COATED ORAL EVERY 6 HOURS PRN
Status: DISCONTINUED | OUTPATIENT
Start: 2023-08-11 | End: 2023-08-12 | Stop reason: HOSPADM

## 2023-08-11 RX ORDER — OXYCODONE HYDROCHLORIDE 5 MG/1
10 TABLET ORAL EVERY 4 HOURS PRN
Status: DISCONTINUED | OUTPATIENT
Start: 2023-08-11 | End: 2023-08-12 | Stop reason: HOSPADM

## 2023-08-11 RX ORDER — ONDANSETRON 2 MG/ML
INJECTION INTRAMUSCULAR; INTRAVENOUS PRN
Status: DISCONTINUED | OUTPATIENT
Start: 2023-08-11 | End: 2023-08-11

## 2023-08-11 RX ORDER — ACETAMINOPHEN 325 MG/1
650 TABLET ORAL EVERY 4 HOURS PRN
Status: DISCONTINUED | OUTPATIENT
Start: 2023-08-14 | End: 2023-08-12 | Stop reason: HOSPADM

## 2023-08-11 RX ORDER — ASPIRIN 81 MG/1
81 TABLET ORAL 2 TIMES DAILY
Status: DISCONTINUED | OUTPATIENT
Start: 2023-08-11 | End: 2023-08-12 | Stop reason: HOSPADM

## 2023-08-11 RX ADMIN — Medication 5 MG: at 10:57

## 2023-08-11 RX ADMIN — SODIUM CHLORIDE, POTASSIUM CHLORIDE, SODIUM LACTATE AND CALCIUM CHLORIDE: 600; 310; 30; 20 INJECTION, SOLUTION INTRAVENOUS at 16:40

## 2023-08-11 RX ADMIN — ASPIRIN 81 MG: 81 TABLET, COATED ORAL at 21:41

## 2023-08-11 RX ADMIN — ACETAMINOPHEN 975 MG: 325 TABLET ORAL at 07:34

## 2023-08-11 RX ADMIN — PROPOFOL 100 MCG/KG/MIN: 10 INJECTION, EMULSION INTRAVENOUS at 10:08

## 2023-08-11 RX ADMIN — MAGNESIUM SULFATE HEPTAHYDRATE 4 G: 4 INJECTION, SOLUTION INTRAVENOUS at 08:57

## 2023-08-11 RX ADMIN — CARVEDILOL 25 MG: 12.5 TABLET, FILM COATED ORAL at 17:32

## 2023-08-11 RX ADMIN — Medication 5 MG: at 11:03

## 2023-08-11 RX ADMIN — HYDRALAZINE HYDROCHLORIDE 50 MG: 50 TABLET, FILM COATED ORAL at 21:41

## 2023-08-11 RX ADMIN — OXYCODONE HYDROCHLORIDE 10 MG: 5 TABLET ORAL at 15:54

## 2023-08-11 RX ADMIN — PHENYLEPHRINE HYDROCHLORIDE 0.25 MCG/KG/MIN: 10 INJECTION INTRAVENOUS at 10:15

## 2023-08-11 RX ADMIN — Medication 5 MG: at 10:49

## 2023-08-11 RX ADMIN — CEFAZOLIN SODIUM 2 G: 2 INJECTION, SOLUTION INTRAVENOUS at 17:32

## 2023-08-11 RX ADMIN — KETOROLAC TROMETHAMINE 15 MG: 15 INJECTION, SOLUTION INTRAMUSCULAR; INTRAVENOUS at 17:31

## 2023-08-11 RX ADMIN — PRIMIDONE 100 MG: 50 TABLET ORAL at 21:41

## 2023-08-11 RX ADMIN — PHENYLEPHRINE HYDROCHLORIDE 100 MCG: 10 INJECTION INTRAVENOUS at 10:23

## 2023-08-11 RX ADMIN — ONDANSETRON 4 MG: 2 INJECTION INTRAMUSCULAR; INTRAVENOUS at 09:41

## 2023-08-11 RX ADMIN — ACETAMINOPHEN 975 MG: 325 TABLET ORAL at 13:00

## 2023-08-11 RX ADMIN — SODIUM CHLORIDE, POTASSIUM CHLORIDE, SODIUM LACTATE AND CALCIUM CHLORIDE: 600; 310; 30; 20 INJECTION, SOLUTION INTRAVENOUS at 08:12

## 2023-08-11 RX ADMIN — HYDROXYZINE HYDROCHLORIDE 10 MG: 10 TABLET ORAL at 15:54

## 2023-08-11 RX ADMIN — ONDANSETRON 4 MG: 2 INJECTION INTRAMUSCULAR; INTRAVENOUS at 10:10

## 2023-08-11 RX ADMIN — FENTANYL CITRATE 100 MCG: 50 INJECTION, SOLUTION INTRAMUSCULAR; INTRAVENOUS at 09:08

## 2023-08-11 RX ADMIN — DEXAMETHASONE SODIUM PHOSPHATE 4 MG: 10 INJECTION, SOLUTION INTRAMUSCULAR; INTRAVENOUS at 10:10

## 2023-08-11 RX ADMIN — PHENYLEPHRINE HYDROCHLORIDE 100 MCG: 10 INJECTION INTRAVENOUS at 10:20

## 2023-08-11 RX ADMIN — SODIUM CHLORIDE, POTASSIUM CHLORIDE, SODIUM LACTATE AND CALCIUM CHLORIDE: 600; 310; 30; 20 INJECTION, SOLUTION INTRAVENOUS at 15:36

## 2023-08-11 RX ADMIN — KETOROLAC TROMETHAMINE 15 MG: 15 INJECTION, SOLUTION INTRAMUSCULAR; INTRAVENOUS at 23:41

## 2023-08-11 RX ADMIN — TRANEXAMIC ACID 1950 MG: 650 TABLET ORAL at 07:34

## 2023-08-11 RX ADMIN — ACETAMINOPHEN 975 MG: 325 TABLET ORAL at 21:41

## 2023-08-11 RX ADMIN — ALLOPURINOL 100 MG: 100 TABLET ORAL at 21:48

## 2023-08-11 RX ADMIN — Medication 5 MG: at 11:13

## 2023-08-11 RX ADMIN — INSULIN ASPART 3 UNITS: 100 INJECTION, SOLUTION INTRAVENOUS; SUBCUTANEOUS at 17:26

## 2023-08-11 RX ADMIN — BUPIVACAINE HYDROCHLORIDE 16 ML: 5 INJECTION, SOLUTION EPIDURAL; INTRACAUDAL; PERINEURAL at 09:14

## 2023-08-11 RX ADMIN — PROPOFOL 50 MG: 10 INJECTION, EMULSION INTRAVENOUS at 10:08

## 2023-08-11 RX ADMIN — PHENYLEPHRINE HYDROCHLORIDE 100 MCG: 10 INJECTION INTRAVENOUS at 10:13

## 2023-08-11 RX ADMIN — Medication 2 G: at 09:49

## 2023-08-11 RX ADMIN — SODIUM CHLORIDE, POTASSIUM CHLORIDE, SODIUM LACTATE AND CALCIUM CHLORIDE: 600; 310; 30; 20 INJECTION, SOLUTION INTRAVENOUS at 10:36

## 2023-08-11 RX ADMIN — SENNOSIDES AND DOCUSATE SODIUM 1 TABLET: 50; 8.6 TABLET ORAL at 21:41

## 2023-08-11 RX ADMIN — PHENYLEPHRINE HYDROCHLORIDE 100 MCG: 10 INJECTION INTRAVENOUS at 11:22

## 2023-08-11 RX ADMIN — LIDOCAINE HYDROCHLORIDE 20 MG: 10 INJECTION, SOLUTION INFILTRATION; PERINEURAL at 10:08

## 2023-08-11 RX ADMIN — ONDANSETRON 4 MG: 2 INJECTION INTRAMUSCULAR; INTRAVENOUS at 18:42

## 2023-08-11 RX ADMIN — OXYCODONE HYDROCHLORIDE 5 MG: 5 TABLET ORAL at 13:01

## 2023-08-11 RX ADMIN — Medication 5 MG: at 10:38

## 2023-08-11 RX ADMIN — BUPIVACAINE HYDROCHLORIDE 2.5 ML: 5 INJECTION, SOLUTION EPIDURAL; INTRACAUDAL at 10:05

## 2023-08-11 ASSESSMENT — ACTIVITIES OF DAILY LIVING (ADL)
ADLS_ACUITY_SCORE: 24
ADLS_ACUITY_SCORE: 20
ADLS_ACUITY_SCORE: 24
ADLS_ACUITY_SCORE: 26
ADLS_ACUITY_SCORE: 20

## 2023-08-11 NOTE — PROVIDER NOTIFICATION
Notified CARLIE Larios, that patients blood sugar is currently 202. He normally only takes metformin to manage his blood sugars. Dr. Sears will put in a sliding scale insulin treatment for patient.

## 2023-08-11 NOTE — OP NOTE
OPERATIVE REPORT    DATE OF SERVICE: 8/11/23    SURGEON: Miky Jarvis MD.    ASSISTANT(S):  Jake Vegas PA-C and Patrick Woods PA-C     PREOPERATIVE DIAGNOSIS:  Osteoarthritis    POSTOPERATIVE DIAGNOSIS:  Osteoarthritis    OPERATION PERFORMED:  [facility] total knee arthroplasty    IMPLANTS:    Implant Name Type Inv. Item Serial No.  Lot No. LRB No. Used Action   IMP BASEPLATE TIBIAL DESMOND II SZ 5 RT TI [id number] - [id number] Total Joint Component/Insert IMP BASEPLATE TIBIAL DESMOND II SZ 5 RT TI [id number]  [employer] [id number] Right 1 Implanted   IMP COMP FEMORAL SNN GEN II CR SZ 6 RT [id number] - [id number] Total Joint Component/Insert IMP COMP FEMORAL SNN GEN II CR SZ 6 RT [id number]  [employer] [id number] Right 1 Implanted   BONE CEMENT SIMPLEX W/TOBRAMYCIN 6197-9-001 - [id number] Cement, Bone BONE CEMENT SIMPLEX W/TOBRAMYCIN 6197-9-001  [employer] [id number] Right 1 Implanted   BONE CEMENT SIMPLEX W/TOBRAMYCIN 6197-9-001 - [id number] Cement, Bone BONE CEMENT SIMPLEX W/TOBRAMYCIN 6197-9-001  [employer] [id number] Right 1 Implanted   IMP INSERT ARTICULAR S&N LGN CR XLPE SZ5-6 9MM [id number] - [id number] Total Joint Component/Insert IMP INSERT ARTICULAR S&N LGN CR XLPE SZ5-6 9MM [id number]  [employer] [id number] Right 1 Implanted       ANESTHETIC: General     OPERATIVE FINDINGS:  End stage arthrosis of the knee. Patella appropriate for patellar retention.     BLOOD LOSS: about 25 cc    TOURNIQUET TIME: about 60 minutes at 250 mm Hg    COMPLICATIONS:  None apparent    OPERATIVE INDICATIONS:  The patient has a long history of debilitating pain secondary to ostearthritis of the knee.  Despite comprehensive non-operative management these symptoms continued to interfere with activities of daily living.  After discussion of further treatment options including the risks and benefits that patient elected to proceed with a total knee.    DESCRIPTION OF THE PROCEDURE:  The patient was  identified in the preoperative holding area.  The consent form including the risks and benefits were reviewed with the patient.  The operative limb was identified and marked.  The patient was brought back to the operating room and placed supine on the operating table.  An anesthetic was induced by the anesthesia team.   The patient was prepped and draped in the normal standard fashion for a knee replacement.  A time-out was called.  Antibiotics were given.The tourniquet was inflated.  We utilized an approximately 15 cm curvilinear incision, centered on the patella ridge, and performed a standard parapatellar approach to the knee. There was normal appearing joint fluid seen upon arthrotomy. A modest medial release was performed. The patella was everted. Medial and lateral retractors were placed. The knee was brought into flexion. The AP axis of the knee was marked and an intra-medullary femoral guide madhav was placed as templated. The epicondylar axis was then marked. The anterior femoral cutting guide was placed and the epicondylar and AP axes were used to set the rotation of the jig. A stylus was then used to set the depth of the resection. Retractors were used to protect the skin and the anterior cut was made. The distal femoral cutting guide was then placed and pinned. The resection was set to remove the cartilage from the intra-condylar notch. The femoral sizing guide was then placed; the knee sized well to a 6. The four-in-one femoral cutting guide was then placed and pinned. The cuts were made. The trial component was well fit. Attention was then turned to the tibia. A PCL retractor was then placed and used to bring the tibia anterior. Medial and lateral retractors were placed. A tibial intra-medullary guide madhav was placed. The tibial cutting guide was then assembled on the guide madhav. The slope was set to nearly mirror the anatomic slope. The tibial cut was first checked with a two-and-nine guide and the cut was  then made. A lamina  was then used to remove the necessary bone from the posterior condyles and then the remaining meniscus was removed. The tibia and was sized and it sized well to a 5. The trial components were then assembled madan  9 mm trial poly was used for trialing. The knee came out into full extension and the knee had greater than 120 degrees of flexion. It was well balanced in the coronal plane with varus and valgus stress at 0 and 30 degrees of flexion. Happy with the reconstruction the tibial rotation was marked, the trial components were removed, and all cancellous surfaces were cleaned with a pulse lavage and then dried. The components were cemented into place, a trial poly placed, the knee brought into extension, and the cement was allowed to dry. With the cement dry the knee was lavaged. The tourniquet was let down and hemostasis was achieved. The knee was then brought into extension and the final poly was placed. It was seen to lock into place. The soft tissues were then injected with analgesic. The capsule was closed with interrupted Vicryl, the dermis with interrupted Vicryl, and skin with running monocryl, Dermabond and steri-strips.  At the end of the procedure the sponge and needle counts were correct times two.  The patient tolerated the procedure well and returned to the PAR extubated and stable.    POSTOPERATIVE PLAN:  1. Weight bearing as tolerated  2. DVT prophylaxis   4. 24 hours of prophylactic antibiotics  5. Follow-up:  Wound clinic in 2 weeks and with Matheus in clinic in 6 weeks for x-rays and a rehabilitation check.

## 2023-08-11 NOTE — ANESTHESIA PREPROCEDURE EVALUATION
Anesthesia Pre-Procedure Evaluation    Patient: Bill Smith   MRN: [id number] : 1950        Procedure : Procedure(s):  ARTHROPLASTY, KNEE, TOTAL          Past Medical History:   Diagnosis Date    Acute medial meniscus tear of right knee     Arthritis     Chronic infection     Diabetes mellitus, type 2 (H) 2023    Essential tremor     hands    Gout     History of hernia repair     HTN     Idiopathic chronic gout without tophus, unspecified site 2018    Parathyroid adenoma 2007    parathyroidectomy 2007    Paresthesia of right leg     Partial arterial retinal occlusion     right eye    Sleep apnea     no CPAP      Past Surgical History:   Procedure Laterality Date    ABDOMEN SURGERY      Hernia repair left and right side    ARTHROSCOPY KNEE WITH MEDIAL MENISCECTOMY Right 2021    Procedure: ARTHROSCOPY, RIGHT KNEE, WITH MEDIAL MENISCECTOMY, MAJOR CHONDROPLASTY.;  Surgeon: Jens Riley MD;  Location: [facility] OR    COLONOSCOPY      HERNIA REPAIR      LAMINECT/DISCECTOMY, CERVICAL  1999    PARATHYROIDECTOMY      partial    ZZC APPENDECTOMY        Allergies   Allergen Reactions    [place] Grass Extract Other (See Comments)     Coughing sneezing    Cats     Grass Pollen(K-O-R-T-Swt Sourav) Cough    Lisinopril Cough    Molds & Smuts Other (See Comments)     Coughing sneezing  Coughing sneezing      Shellfish Allergy Other (See Comments)     Causes gout      Social History     Tobacco Use    Smoking status: Never    Smokeless tobacco: Never   Substance Use Topics    Alcohol use: Not Currently     Comment: rarely      Wt Readings from Last 1 Encounters:   23 110.1 kg (242 lb 11.2 oz)        Anesthesia Evaluation   Pt has had prior anesthetic.     No history of anesthetic complications       ROS/MED HX  ENT/Pulmonary:     (+) sleep apnea (Noncompliant), doesn't use CPAP,                                     Neurologic:     (+)    peripheral neuropathy (RLE paresthesias),                             Cardiovascular:     (+)  hypertension- -   -  - -                                      METS/Exercise Tolerance:     Hematologic:       Musculoskeletal:   (+)  arthritis,             GI/Hepatic:  - neg GI/hepatic ROS     Renal/Genitourinary:  - neg Renal ROS     Endo:     (+)  type II DM,   Not using insulin,                 Psychiatric/Substance Use:     (+) psychiatric history anxiety and depression       Infectious Disease:  - neg infectious disease ROS     Malignancy:       Other:            Physical Exam    Airway        Mallampati: IV   TM distance: > 3 FB   Neck ROM: full     Respiratory Devices and Support         Dental       (+) Multiple crowns, permanant bridges      Cardiovascular          Rhythm and rate: regular     Pulmonary           breath sounds clear to auscultation           OUTSIDE LABS:  CBC:   Lab Results   Component Value Date    WBC 10.3 08/08/2023    WBC 10.3 06/17/2021    HGB 14.8 08/08/2023    HGB 14.2 06/17/2021    HCT 43.7 08/08/2023    HCT 42.5 06/17/2021     08/08/2023     06/17/2021     BMP:   Lab Results   Component Value Date     08/08/2023     10/06/2022    POTASSIUM 3.2 (L) 08/11/2023    POTASSIUM 3.3 (L) 08/08/2023    CHLORIDE 104 08/08/2023    CHLORIDE 107 10/06/2022    CO2 26 08/08/2023    CO2 31 10/06/2022    BUN 10.3 08/08/2023    BUN 9 10/06/2022    CR 0.77 08/08/2023    CR 0.77 10/06/2022     (H) 08/11/2023     (H) 08/08/2023     COAGS: No results found for: PTT, INR, FIBR  POC:   Lab Results   Component Value Date     (H) 06/30/2021     HEPATIC:   Lab Results   Component Value Date    ALBUMIN 3.7 02/26/2021    PROTTOTAL 7.1 02/26/2021    ALT 27 02/26/2021    AST 14 02/26/2021    ALKPHOS 87 02/26/2021    BILITOTAL 0.6 02/26/2021     OTHER:   Lab Results   Component Value Date    A1C 6.8 (H) 08/08/2023    BRAXTON 9.3 08/08/2023    TSH 0.96 10/22/2021       Anesthesia Plan    ASA Status:  3    NPO Status:  NPO  Appropriate    Anesthesia Type: Spinal.         Techniques and Equipment:       - Drips/Meds: Phenylephrine     Consents    Anesthesia Plan(s) and associated risks, benefits, and realistic alternatives discussed. Questions answered and patient/representative(s) expressed understanding.     - Discussed:     - Discussed with:  Patient       - Patient is DNR/DNI Status: No          Postoperative Care    Pain management: Multi-modal analgesia, Peripheral nerve block (Single Shot).   PONV prophylaxis: Dexamethasone or Solumedrol, Ondansetron (or other 5HT-3)     Comments:                Megha Stewart MD

## 2023-08-11 NOTE — PROGRESS NOTES
Admit time 1430    Vitals  1430 done   1500 done   1530 done   1630 done   1730 done   2130  0130  Q8     Neuro  1430 done  1630 done   1830 done   Q4 for 24 hours   Q 8     Pain controlled by oral pain meds.     Ambulation of 75 feet completed, assist of one, walker, gait belt.     LR at 125ml/hr    Vitals stable, long blue cuff.     Voiding.

## 2023-08-11 NOTE — ANESTHESIA PROCEDURE NOTES
"Adductor canal Procedure Note    Pre-Procedure   Staff -        Anesthesiologist:  Megha Stewart MD       Performed By: anesthesiologist       Location: pre-op       Procedure Start/Stop Times: 8/11/2023 9:11 AM and 8/11/2023 9:14 AM       Pre-Anesthestic Checklist: patient identified, IV checked, site marked, risks and benefits discussed, informed consent, monitors and equipment checked, pre-op evaluation, at physician/surgeon's request and post-op pain management  Timeout:       Correct Patient: Yes        Correct Procedure: Yes        Correct Site: Yes        Correct Position: Yes        Correct Laterality: Yes        Site Marked: Yes  Procedure Documentation  Procedure: Adductor canal       Laterality: right       Patient Position: supine       Patient Prep/Sterile Barriers: sterile gloves, mask       Skin prep: Chloraprep       Needle Type: short bevel       Needle Gauge: 20.        Needle Length (Inches): 4        Ultrasound guided       1. Ultrasound was used to identify targeted nerve, plexus, vascular marker, or fascial plane and place a needle adjacent to it in real-time.       2. Ultrasound was used to visualize the spread of anesthetic in close proximity to the above referenced structure.       3. A permanent image is entered into the patient's record.       4. The visualized anatomic structures appeared normal.       5. There were no apparent abnormal pathologic findings.    Assessment/Narrative         The placement was negative for: blood aspirated and painful injection       Paresthesias: No.       Bolus given via needle..        Secured via.        Insertion/Infusion Method: Single Shot       Complications: none    Medication(s) Administered   Medication Administration Time: 8/11/2023 9:11 AM      FOR [facility] ([facility]) ONLY:   Pain Team Contact information: please page the Pain Team Via InsideView. Search \"Pain\". During daytime hours, please page the attending first. At night please page " the resident first.

## 2023-08-11 NOTE — PHARMACY-ADMISSION MEDICATION HISTORY
Pharmacist Admission Medication History    Admission medication history is complete. The information provided in this note is only as accurate as the sources available at the time of the update.    Medication reconciliation/reorder completed by provider prior to medication history? No    Information Source(s): Patient and CareEverywhere/SureScripts via in-person    Pertinent Information:     Changes made to PTA medication list:  Added: None  Deleted: None  Changed: hydralazine         Allergies reviewed with patient and updates made in EHR: yes    Medication History Completed By: Lashonda Sears RPH 8/11/2023 8:50 AM    Prior to Admission medications    Medication Sig Last Dose Taking? Auth Provider Long Term End Date   acetaminophen (TYLENOL) 325 MG tablet Take 650 mg by mouth every 6 hours as needed for mild pain 8/10/2023 Yes Reported, Patient     allopurinol (ZYLOPRIM) 100 MG tablet TAKE 1 TABLET BY MOUTH TWICE DAILY FOR  GOUT 8/10/2023 at PM Yes Nirmal Lubin MD     ascorbic acid (VITAMIN C) 1000 MG TABS Take 1,000 mg by mouth daily 8/10/2023 at AM Yes Reported, Patient     bevacizumab (AVASTIN) 25 mg/mL intravitreal inj by Intravitreal route once Every 7 weeks Unknown Yes Unknown, Entered By History Yes    carvedilol (COREG) 25 MG tablet TAKE 1 TABLET BY MOUTH TWICE DAILY WITH MEALS FOR BLOOD PRESSURE 8/11/2023 at AM Yes Nirmal Lubin MD Yes    chlorthalidone (HYGROTON) 25 MG tablet Take 1 tablet (25 mg) by mouth every morning For blood pressure. 8/10/2023 at AM Yes Nirmal Lubin MD Yes    fluticasone (FLONASE) 50 MCG/ACT nasal spray Spray 1 spray into both nostrils daily as needed for rhinitis or allergies not recent Yes Unknown, Entered By History     hydrALAZINE (APRESOLINE) 50 MG tablet TAKE 1 TABLET BY MOUTH THREE TIMES DAILY FOR BLOOD PRESSURE  Patient taking differently: Take 50 mg by mouth 2 times daily 8/10/2023 Yes Nirmal Lubin MD Yes    indomethacin (INDOCIN) 50 MG capsule Take 1 capsule (50 mg) by mouth 3 times  daily as needed for moderate pain Unknown Yes Nirmal Lubin MD Yes    losartan (COZAAR) 100 MG tablet Take 1 tablet by mouth once daily for blood pressure 8/10/2023 at PM Yes Nirmal Lubin MD Yes    metFORMIN (GLUCOPHAGE) 500 MG tablet TAKE 1 TABLET BY MOUTH TWICE DAILY WITH MEALS FOR DIABETES 8/9/2023 Yes Nirmal Lubin MD Yes    MULTIPLE VITAMIN PO Take 1 tablet by mouth daily Stopping 8/4/23 before surgery 8/9/2023 Yes Reported, Patient     primidone (MYSOLINE) 50 MG tablet Take 50 mg in the morning and 100 mg in the evening. 8/10/2023 at PM Yes Guillermo Rivera MD Yes

## 2023-08-11 NOTE — PROGRESS NOTES
08/11/23 1430   Appointment Info   Signing Clinician's Name / Credentials (PT) Zachary Irby, PT, DPT   Living Environment   People in Home spouse   Current Living Arrangements house   Home Accessibility stairs to enter home;stairs within home   Number of Stairs, Main Entrance 3   Number of Stairs, Within Home, Primary greater than 10 stairs   Self-Care   Usual Activity Tolerance good   Current Activity Tolerance moderate   Equipment Currently Used at Home cane, straight   Fall history within last six months no   General Information   Onset of Illness/Injury or Date of Surgery 08/11/23   Referring Physician Dr. Jarvis   Patient/Family Therapy Goals Statement (PT) Decrease pain with mobility   Pertinent History of Current Problem (include personal factors and/or comorbidities that impact the POC) s/p TKA   Existing Precautions/Restrictions weight bearing   Weight-Bearing Status - LLE full weight-bearing   Weight-Bearing Status - RLE weight-bearing as tolerated   Range of Motion (ROM)   ROM Comment WFL, decreased LLE s/p TKA   Strength (Manual Muscle Testing)   Strength Comments WFL   Bed Mobility   Bed Mobility supine-sit   Supine-Sit [place] (Bed Mobility) modified independence   Assistive Device (Bed Mobility) bed rails   Transfers   Transfers sit-stand transfer   Sit-Stand Transfer   Sit-Stand [place] (Transfers) contact guard   Assistive Device (Sit-Stand Transfers) walker, front-wheeled   Gait/Stairs (Locomotion)   [place] Level (Gait) contact guard   Assistive Device (Gait) walker, front-wheeled   Distance in Feet 10'   Distance in Feet (Gait) 120'   Pattern (Gait) step-through   Deviations/Abnormal Patterns (Gait) raad decreased;stride length decreased   Clinical Impression   Criteria for Skilled Therapeutic Intervention Yes, treatment indicated   PT Diagnosis (PT) Impaired functional mobility   Influenced by the following impairments Weakness, pain   Functional limitations due to  impairments Transfers, gait, stairs   Clinical Presentation (PT Evaluation Complexity) Stable/Uncomplicated   Clinical Presentation Rationale Pt presents as medically diagnosed   Clinical Decision Making (Complexity) low complexity   Planned Therapy Interventions (PT) gait training;home exercise program;patient/family education;ROM (range of motion);stair training;strengthening;transfer training   Anticipated Equipment Needs at Discharge (PT) walker, rolling   Risk & Benefits of therapy have been explained care plan/treatment goals reviewed;patient;spouse/significant other;daughter   PT Total Evaluation Time   PT Eval, Low Complexity Minutes (92762) 10   Plan of Care Review   Plan of Care Reviewed With patient;spouse;daughter   Physical Therapy Goals   PT Frequency Daily   PT Predicted Duration/Target Date for Goal Attainment 08/13/23   PT Goals Transfers;Gait;Stairs;PT Goal 1   PT: Transfers Supervision/stand-by assist;Sit to/from stand   PT: Gait Supervision/stand-by assist;Rolling walker;150 feet   PT: Stairs Supervision/stand-by assist;4 stairs;Rail on both sides   PT: Goal 1 I with TKA HEP   Interventions   Interventions Quick Adds Gait Training;Therapeutic Activity   Therapeutic Activity   Therapeutic Activities: dynamic activities to improve functional performance Minutes (91802) 10   Symptoms Noted During/After Treatment Fatigue;Increased pain   Treatment Detail/Skilled Intervention Supine to sit Mod I with HOB elevated, sitting EOB SBA, sit<>stand from EOB and toilet CGA, independent with toileting, cueing to extend RLE with sitting for comfort. Increased time for set up and due to this being first time pt got up.   Gait Training   Gait Training Minutes (97189) 5   Symptoms Noted During/After Treatment (Gait Training) fatigue   Treatment Detail/Skilled Intervention Pt amb well in the halls CGA with FWW, pain 5-6/10 did not increase with activity, encouraged amb with nursing tonight again. No LOB or adverse  s/s. Good step length, slow speed.   [place] Level (Gait Training) contact guard   Physical Assistance Level (Gait Training) supervision;verbal cues   Weight Bearing (Gait Training) weight-bearing as tolerated   Assistive Device (Gait Training) rolling walker   Pattern Analysis (Gait Training) swing-through gait   Gait Analysis Deviations decreased raad;decreased step length;decreased weight-shifting ability   Impairments (Gait Analysis/Training) pain   PT Discharge Planning   PT Plan TKA HEP, stair trial, amb   PT Discharge Recommendation (DC Rec)   (Defer to ortho team)   PT Rationale for DC Rec Pt amb and transferring well, pain moderate, needs stair trial and TKA HEP, spouse at home to assist as needed.   PT Brief overview of current status Amb 120' with FWW, transfers SBA

## 2023-08-11 NOTE — ANESTHESIA CARE TRANSFER NOTE
Patient: Bill Smith    Procedure: Procedure(s):  ARTHROPLASTY, KNEE, TOTAL       Diagnosis: Primary localized osteoarthritis of right knee [M17.11]  Diagnosis Additional Information: No value filed.    Anesthesia Type:   Spinal     Note:    Oropharynx: oropharynx clear of all foreign objects  Level of Consciousness: awake  Oxygen Supplementation: face mask  Level of Supplemental Oxygen (L/min / FiO2): 6  Independent Airway: airway patency satisfactory and stable  Dentition: dentition unchanged S/P dental procedure  Vital Signs Stable: post-procedure vital signs reviewed and stable  Report to RN Given: handoff report given  Patient transferred to: PACU    Handoff Report: Identifed the Patient, Identified the Reponsible Provider, Reviewed the pertinent medical history, Discussed the surgical course, Reviewed Intra-OP anesthesia mangement and issues during anesthesia, Set expectations for post-procedure period and Allowed opportunity for questions and acknowledgement of understanding    Vitals:  Vitals Value Taken Time   /71 08/11/23 1208   Temp     Pulse 79 08/11/23 1208   Resp 19 08/11/23 1208   SpO2 97 % 08/11/23 1208   Vitals shown include unvalidated device data.    Electronically Signed By: RAVI Donis CRNA  August 11, 2023  12:09 PM

## 2023-08-11 NOTE — ANESTHESIA POSTPROCEDURE EVALUATION
Patient: Bill Smith    Procedure: Procedure(s):  ARTHROPLASTY, KNEE, TOTAL       Anesthesia Type:  Spinal    Note:     Postop Pain Control: Uneventful            Sign Out: Well controlled pain   PONV: No   Neuro/Psych: Uneventful            Sign Out: Acceptable/Baseline neuro status   Airway/Respiratory: Uneventful            Sign Out: Acceptable/Baseline resp. status   CV/Hemodynamics: Uneventful            Sign Out: Acceptable CV status; No obvious hypovolemia; No obvious fluid overload   Other NRE: NONE   DID A NON-ROUTINE EVENT OCCUR? No           Last vitals:  Vitals Value Taken Time   /84 08/11/23 1330   Temp 36.6  C (97.8  F) 08/11/23 1230   Pulse 85 08/11/23 1338   Resp 13 08/11/23 1233   SpO2 94 % 08/11/23 1338   Vitals shown include unvalidated device data.    Electronically Signed By: Dion Sears MD  August 11, 2023  1:40 PM

## 2023-08-11 NOTE — CONSULTS
[facility]  Consult Note - Hospitalist Service  Date of Admission:  8/11/2023  Consult Requested by: Orthopedic surgery  Reason for Consult: Management of chronic medical conditions    Assessment & Plan   Bill Smith is a 72 year old male admitted on 8/11/2023.  He has a past medical history significant for diabetes, hypertension and gout who presented to the hospital for elective right total knee arthroplasty.  Hospitalist medicine consulted for management of chronic medical conditions.        Hypertension  -At home on Coreg 25 mg twice daily, chlorthalidone 25 mg, hydralazine 50 mg 2 times daily, losartan 100 mg daily  -Blood pressure around 140/80.    Plan:  [] Continue home Coreg and hydralazine  [] Hold losartan until 8/12      Diabetes  -At home on metformin 500 mg twice daily    Plan:  [] Hold metformin  [] Start sliding scale insulin    Hand tremor  -Primidone for hand tremor.    Plan:  [] Continue home medication.    Mild hypokalemia  -Potassium this morning 3.2  -Per patient, he received some potassium this morning, however, it is not listed in the MAR summary.    Plan:  [] Replete potassium per protocol.         MARCIN VILLEDA MD  Hospitalist Service  Securely message with [employer] (more info)  Text page via [facility] Paging/Directory   ______________________________________________________________________        History of Present Illness   Bill Smith is a 72 year old male admitted on 8/11/2023.  He has a past medical history significant for diabetes, hypertension and gout who presented to the hospital for elective right total knee arthroplasty.    Evaluated around 1 PM.  Patient is doing well.  He denies any significant pain.  He denies any chest pain or shortness of breath.  He denies any lightheadedness.      Past Medical History    Past Medical History:   Diagnosis Date    Acute medial meniscus tear of right knee     Arthritis     Chronic infection     Diabetes  mellitus, type 2 (H) 07/25/2023    Essential tremor     hands    Gout     History of hernia repair     HTN     Idiopathic chronic gout without tophus, unspecified site 12/05/2018    Parathyroid adenoma 2007    parathyroidectomy 04/2007    Paresthesia of right leg     Partial arterial retinal occlusion     right eye    Sleep apnea     no CPAP       Past Surgical History   Past Surgical History:   Procedure Laterality Date    ABDOMEN SURGERY      Hernia repair left and right side    ARTHROSCOPY KNEE WITH MEDIAL MENISCECTOMY Right 06/30/2021    Procedure: ARTHROSCOPY, RIGHT KNEE, WITH MEDIAL MENISCECTOMY, MAJOR CHONDROPLASTY.;  Surgeon: Jens Riley MD;  Location: [facility] OR    COLONOSCOPY      HERNIA REPAIR      LAMINECT/DISCECTOMY, CERVICAL  01/01/1999    PARATHYROIDECTOMY  2007    partial    ZZC APPENDECTOMY         Medications   I have reviewed this patient's current medications  Current Facility-Administered Medications   Medication    [START ON 8/14/2023] acetaminophen (TYLENOL) tablet 650 mg    acetaminophen (TYLENOL) tablet 975 mg    allopurinol (ZYLOPRIM) tablet 100 mg    carvedilol (COREG) tablet 25 mg    glucose gel 15-30 g    Or    dextrose 50 % injection 25-50 mL    Or    glucagon injection 1 mg    fentaNYL (PF) (SUBLIMAZE) injection 25 mcg    fentaNYL (PF) (SUBLIMAZE) injection 50 mcg    haloperidol lactate (HALDOL) injection 1 mg    hydrALAZINE (APRESOLINE) tablet 50 mg    HYDROmorphone (DILAUDID) injection 0.2 mg    HYDROmorphone (DILAUDID) injection 0.4 mg    insulin aspart (NovoLOG) injection (RAPID ACTING)    insulin aspart (NovoLOG) injection (RAPID ACTING)    lactated ringers infusion    ondansetron (ZOFRAN ODT) ODT tab 4 mg    Or    ondansetron (ZOFRAN) injection 4 mg    oxyCODONE (ROXICODONE) tablet 5 mg    Or    oxyCODONE (ROXICODONE) tablet 10 mg    primidone (MYSOLINE) tablet 100 mg    prochlorperazine (COMPAZINE) injection 5 mg            Physical Exam   Vital Signs: Temp: 97.8  F (36.6  " C) Temp src: Temporal BP: (!) 146/85 Pulse: 83   Resp: 14 SpO2: 94 % O2 Device: Nasal cannula Oxygen Delivery: 2 LPM  Weight: 242 lbs 11.2 oz    Physical Exam  Constitutional:       General: He is not in acute distress.     Appearance: He is not ill-appearing or toxic-appearing.   Cardiovascular:      Rate and Rhythm: Normal rate.   Pulmonary:      Effort: No respiratory distress.      Breath sounds: No wheezing.   Skin:     General: Skin is warm and dry.   Neurological:      Mental Status: He is alert.   Psychiatric:         Mood and Affect: Mood normal.          Medical Decision Making       40 MINUTES SPENT BY ME on the date of service doing chart review, history, exam, documentation & further activities per the note.      Data     I have personally reviewed the following data over the past 24 hrs:    N/A  \   N/A   / N/A     N/A N/A N/A /  194 (H)   3.2 (L) N/A N/A \       Imaging results reviewed over the past 24 hrs:   Recent Results (from the past 24 hour(s))   POC US Guidance Needle Placement    Narrative    Ultrasound was performed as guidance to an anesthesia procedure.  Click   \"PACS images\" hyperlink below to view any stored images.  For specific   procedure details, view procedure note authored by anesthesia.   XR Knee Port Right 1/2 Views    Narrative    EXAM: XR KNEE PORT RIGHT 1/2 VIEWS  LOCATION: [facility]  DATE: 8/11/2023    INDICATION: Post Op Total Knee  COMPARISON: Right knee radiographs 07/25/2023.      Impression    IMPRESSION: Right TKA. Negative for postoperative purposes.     "

## 2023-08-11 NOTE — ANESTHESIA PROCEDURE NOTES
"Intrathecal injection Procedure Note    Pre-Procedure   Staff -        Anesthesiologist:  Megha Stewart MD       Performed By: anesthesiologist       Location: OR       Procedure Start/Stop Times: 8/11/2023 9:59 AM and 8/11/2023 10:05 AM       Pre-Anesthestic Checklist: patient identified, IV checked, risks and benefits discussed, informed consent, monitors and equipment checked, pre-op evaluation, at physician/surgeon's request and post-op pain management  Timeout:       Correct Patient: Yes        Correct Procedure: Yes        Correct Site: Yes        Correct Position: Yes   Procedure Documentation  Procedure: intrathecal injection       Patient Position: sitting       Patient Prep/Sterile Barriers: sterile gloves, mask, patient draped       Skin prep: Chloraprep       Insertion Site: L3-4. (midline approach).       Needle Gauge: 24.        Needle Length (Inches): 4        Spinal Needle Type: Pencan       Introducer used    Assessment/Narrative         Paresthesias: No.       CSF fluid: clear.       Opening pressure was cmH2O while  Sitting.      Medication(s) Administered   Medication Administration Time: 8/11/2023 9:59 AM      FOR [facility] ([facility]) ONLY:   Pain Team Contact information: please page the Pain Team Via [employer]. Search \"Pain\". During daytime hours, please page the attending first. At night please page the resident first.      "

## 2023-08-12 ENCOUNTER — APPOINTMENT (OUTPATIENT)
Dept: OCCUPATIONAL THERAPY | Facility: CLINIC | Age: 73
End: 2023-08-12
Attending: ORTHOPAEDIC SURGERY
Payer: MEDICARE

## 2023-08-12 ENCOUNTER — APPOINTMENT (OUTPATIENT)
Dept: PHYSICAL THERAPY | Facility: CLINIC | Age: 73
End: 2023-08-12
Attending: ORTHOPAEDIC SURGERY
Payer: MEDICARE

## 2023-08-12 VITALS
SYSTOLIC BLOOD PRESSURE: 131 MMHG | TEMPERATURE: 98.8 F | RESPIRATION RATE: 17 BRPM | BODY MASS INDEX: 32.17 KG/M2 | OXYGEN SATURATION: 92 % | DIASTOLIC BLOOD PRESSURE: 70 MMHG | HEIGHT: 73 IN | HEART RATE: 91 BPM | WEIGHT: 242.7 LBS

## 2023-08-12 LAB
GLUCOSE BLDC GLUCOMTR-MCNC: 132 MG/DL (ref 70–99)
HGB BLD-MCNC: 12 G/DL (ref 13.3–17.7)
POTASSIUM BLD-SCNC: 3.3 MMOL/L (ref 3.5–5)

## 2023-08-12 PROCEDURE — 250N000013 HC RX MED GY IP 250 OP 250 PS 637: Performed by: STUDENT IN AN ORGANIZED HEALTH CARE EDUCATION/TRAINING PROGRAM

## 2023-08-12 PROCEDURE — 97535 SELF CARE MNGMENT TRAINING: CPT | Mod: GO

## 2023-08-12 PROCEDURE — 250N000013 HC RX MED GY IP 250 OP 250 PS 637: Performed by: PHYSICIAN ASSISTANT

## 2023-08-12 PROCEDURE — 250N000013 HC RX MED GY IP 250 OP 250 PS 637: Performed by: ORTHOPAEDIC SURGERY

## 2023-08-12 PROCEDURE — 84132 ASSAY OF SERUM POTASSIUM: CPT | Performed by: ORTHOPAEDIC SURGERY

## 2023-08-12 PROCEDURE — 36415 COLL VENOUS BLD VENIPUNCTURE: CPT | Performed by: PHYSICIAN ASSISTANT

## 2023-08-12 PROCEDURE — 97110 THERAPEUTIC EXERCISES: CPT | Mod: GP

## 2023-08-12 PROCEDURE — 250N000011 HC RX IP 250 OP 636: Mod: JZ | Performed by: PHYSICIAN ASSISTANT

## 2023-08-12 PROCEDURE — 97166 OT EVAL MOD COMPLEX 45 MIN: CPT | Mod: GO

## 2023-08-12 PROCEDURE — 99214 OFFICE O/P EST MOD 30 MIN: CPT | Performed by: STUDENT IN AN ORGANIZED HEALTH CARE EDUCATION/TRAINING PROGRAM

## 2023-08-12 PROCEDURE — 85018 HEMOGLOBIN: CPT | Performed by: PHYSICIAN ASSISTANT

## 2023-08-12 PROCEDURE — 97116 GAIT TRAINING THERAPY: CPT | Mod: GP

## 2023-08-12 PROCEDURE — 82962 GLUCOSE BLOOD TEST: CPT

## 2023-08-12 RX ORDER — HYDROXYZINE HYDROCHLORIDE 10 MG/1
10 TABLET, FILM COATED ORAL EVERY 6 HOURS PRN
Qty: 20 TABLET | Refills: 0 | Status: SHIPPED | OUTPATIENT
Start: 2023-08-12 | End: 2023-08-17

## 2023-08-12 RX ORDER — AMOXICILLIN 250 MG
1 CAPSULE ORAL 2 TIMES DAILY
Qty: 20 TABLET | Refills: 0 | Status: SHIPPED | OUTPATIENT
Start: 2023-08-12

## 2023-08-12 RX ORDER — POTASSIUM CHLORIDE 1500 MG/1
40 TABLET, EXTENDED RELEASE ORAL ONCE
Status: DISCONTINUED | OUTPATIENT
Start: 2023-08-12 | End: 2023-08-12

## 2023-08-12 RX ORDER — POTASSIUM CHLORIDE 1500 MG/1
40 TABLET, EXTENDED RELEASE ORAL ONCE
Status: COMPLETED | OUTPATIENT
Start: 2023-08-12 | End: 2023-08-12

## 2023-08-12 RX ORDER — POLYETHYLENE GLYCOL 3350 17 G/17G
17 POWDER, FOR SOLUTION ORAL DAILY
Qty: 7 PACKET | Refills: 0 | Status: SHIPPED | OUTPATIENT
Start: 2023-08-12

## 2023-08-12 RX ORDER — TRAMADOL HYDROCHLORIDE 50 MG/1
50 TABLET ORAL EVERY 6 HOURS PRN
Qty: 15 TABLET | Refills: 0 | Status: SHIPPED | OUTPATIENT
Start: 2023-08-12 | End: 2023-08-17

## 2023-08-12 RX ORDER — ASPIRIN 81 MG/1
81 TABLET ORAL 2 TIMES DAILY
Qty: 56 TABLET | Refills: 0 | Status: SHIPPED | OUTPATIENT
Start: 2023-08-12 | End: 2023-09-09

## 2023-08-12 RX ORDER — ONDANSETRON 4 MG/1
4 TABLET, ORALLY DISINTEGRATING ORAL EVERY 6 HOURS PRN
Qty: 8 TABLET | Refills: 0 | Status: SHIPPED | OUTPATIENT
Start: 2023-08-12

## 2023-08-12 RX ORDER — ACETAMINOPHEN 325 MG/1
975 TABLET ORAL EVERY 8 HOURS
Qty: 100 TABLET | Refills: 0 | Status: SHIPPED | OUTPATIENT
Start: 2023-08-12

## 2023-08-12 RX ADMIN — POLYETHYLENE GLYCOL 3350 17 G: 17 POWDER, FOR SOLUTION ORAL at 08:37

## 2023-08-12 RX ADMIN — HYDRALAZINE HYDROCHLORIDE 50 MG: 50 TABLET, FILM COATED ORAL at 08:39

## 2023-08-12 RX ADMIN — SENNOSIDES AND DOCUSATE SODIUM 1 TABLET: 50; 8.6 TABLET ORAL at 08:37

## 2023-08-12 RX ADMIN — KETOROLAC TROMETHAMINE 15 MG: 15 INJECTION, SOLUTION INTRAMUSCULAR; INTRAVENOUS at 06:08

## 2023-08-12 RX ADMIN — ACETAMINOPHEN 975 MG: 325 TABLET ORAL at 04:18

## 2023-08-12 RX ADMIN — POTASSIUM CHLORIDE 40 MEQ: 1500 TABLET, EXTENDED RELEASE ORAL at 08:36

## 2023-08-12 RX ADMIN — CARVEDILOL 25 MG: 12.5 TABLET, FILM COATED ORAL at 08:37

## 2023-08-12 RX ADMIN — ASPIRIN 81 MG: 81 TABLET, COATED ORAL at 08:36

## 2023-08-12 RX ADMIN — ONDANSETRON 4 MG: 4 TABLET, ORALLY DISINTEGRATING ORAL at 10:54

## 2023-08-12 RX ADMIN — HYDROXYZINE HYDROCHLORIDE 10 MG: 10 TABLET ORAL at 04:31

## 2023-08-12 RX ADMIN — PRIMIDONE 50 MG: 50 TABLET ORAL at 08:39

## 2023-08-12 RX ADMIN — ALLOPURINOL 100 MG: 100 TABLET ORAL at 08:36

## 2023-08-12 RX ADMIN — CEFAZOLIN SODIUM 2 G: 2 INJECTION, SOLUTION INTRAVENOUS at 01:54

## 2023-08-12 ASSESSMENT — ACTIVITIES OF DAILY LIVING (ADL)
ADLS_ACUITY_SCORE: 26
PREVIOUS_RESPONSIBILITIES: DRIVING;MEDICATION MANAGEMENT;FINANCES
ADLS_ACUITY_SCORE: 26

## 2023-08-12 NOTE — PROGRESS NOTES
[facility]      OUTPATIENT OCCUPATIONAL THERAPY  EVALUATION  PLAN OF TREATMENT FOR OUTPATIENT REHABILITATION  (COMPLETE FOR INITIAL CLAIMS ONLY)  Patient's Last Name, First Name, M.I.  YOB: 1950  Bill Smith                          Provider's Name  [facility] Medical Record No.  [id number]                               Onset Date:  08/11/23   Start of Care Date:  08/12/23     Type:     ___PT   _X_OT   ___SLP Medical Diagnosis:  s/p TKA                        OT Diagnosis:  Decreased ADL independence   Visits from SOC:  1   _________________________________________________________________________________  Plan of Treatment/Functional Goals    Planned Interventions: ADL retraining, transfer training, bed mobility training   Goals: See Occupational Therapy Goals on Care Plan in [facility] electronic health record.    Therapy Frequency: One time eval and treatment  Predicted Duration of Therapy Intervention: 08/12/23  _________________________________________________________________________________    I CERTIFY THE NEED FOR THESE SERVICES FURNISHED UNDER        THIS PLAN OF TREATMENT AND WHILE UNDER MY CARE .             Physician Signature               Date    X_____________________________________________________                  Certification date from: 08/12/23, Certification date to: 09/12/23    Referring Physician: Miky Jarvis MD            Initial Assessment        See Occupational Therapy evaluation dated 08/12/23 in Epic electronic health record.       08/12/23 0830   Appointment Info   Signing Clinician's Name / Credentials (OT) Carmel Aaron OT   Quick Adds   Quick Adds Certification   Living Environment   People in Home spouse   Current Living Arrangements house   Home Accessibility stairs to enter home;stairs within home   Transportation  "Anticipated family or friend will provide   Self-Care   Usual Activity Tolerance good   Current Activity Tolerance moderate   Equipment Currently Used at Home raised toilet seat;shower chair;grab bar, toilet;grab bar, tub/shower   Fall history within last six months no   Activity/Exercise/Self-Care Comment Ind BADLs at baseline   Instrumental Activities of Daily Living (IADL)   Previous Responsibilities driving;medication management;finances   IADL Comments Spouse manages housekeeping, laundry, shopping   General Information   Onset of Illness/Injury or Date of Surgery 08/11/23   Referring Physician Miky Jarvis MD   Patient/Family Therapy Goal Statement (OT) \"To get back to driving after 12 weeks\"   Additional Occupational Profile Info/Pertinent History of Current Problem s/p TKA   Existing Precautions/Restrictions no pivoting or twisting  (RLE)   Left Lower Extremity (Weight-bearing Status) full weight-bearing (FWB)   Right Lower Extremity (Weight-bearing Status) weight-bearing as tolerated (WBAT)   Cognitive Status Examination   Orientation Status orientation to person, place and time   Affect/Mental Status (Cognitive) WFL   Range of Motion Comprehensive   General Range of Motion no range of motion deficits identified  (BUE)   Strength Comprehensive (MMT)   General Manual Muscle Testing (MMT) Assessment no strength deficits identified  (BUE)   Bed Mobility   Bed Mobility supine-sit   Supine-Sit [place] (Bed Mobility) supervision   Transfers   Transfers sit-stand transfer   Sit-Stand Transfer   Sit-Stand [place] (Transfers) supervision   Assistive Device (Sit-Stand Transfers) walker, front-wheeled   Clinical Impression   Criteria for Skilled Therapeutic Interventions Met (OT) Yes, treatment indicated   OT Diagnosis Decreased ADL independence   OT Problem List-Impairments impacting ADL problems related to;pain;mobility;flexibility   Assessment of Occupational Performance 1-3 Performance Deficits "   Identified Performance Deficits dressing, transfers, kitchen mob   Planned Therapy Interventions (OT) ADL retraining;transfer training;bed mobility training   Clinical Decision Making Complexity (OT) moderate complexity   Risk & Benefits of therapy have been explained evaluation/treatment results reviewed;care plan/treatment goals reviewed;patient;spouse/significant other;daughter   OT Total Evaluation Time   OT Eval, Moderate Complexity Minutes (39943) 10   Therapy Certification   Medical Diagnosis s/p TKA   Start of Care Date 08/12/23   Certification date from 08/12/23   Certification date to 09/12/23   OT Goals   Therapy Frequency (OT) One time eval and treatment   OT Predicted Duration/Target Date for Goal Attainment 08/12/23   OT Goals Lower Body Dressing;Toilet Transfer/Toileting;Bed Mobility   OT: Lower Body Dressing Modified independent   OT: Bed Mobility Modified independent   OT: Toilet Transfer/Toileting Modified independent   Interventions   Interventions Quick Adds Self-Care/Home Management   Self-Care/Home Management   Self-Care/Home Mgmt/ADL, Compensatory, Meal Prep Minutes (27232) 25   Symptoms Noted During/After Treatment (Meal Preparation/Planning Training) none   Treatment Detail/Skilled Intervention Pt completes bed mob supine>sit with HOB elevated, SBA/Mod I (pt does have the ability to raise/lower HOB at home). Educ on safe sleeping positions; pt/family verbalized understanding. Pt completes LB dressing after OT interventions, Mod I. Declines use of AE for socks- will have spouse assist. Pt performs sit<>stand transfers throughout session at chair/RTS/EOB with FWW and SBA, progressing to Mod I at end of session. Fxl mob in room with FWW, SBA/Mod I. Toileting SBA/Mod I. Walk-in shower transfer with SBA after OT interventions/cues for safety. Educ on FWW safety with kitchen mobility. Educ on safe technique for car transfer. Handout issued. Pt seated in chair at end of session, all needs within  reach.   OT Discharge Planning   OT Plan d/c OT   OT Discharge Recommendation (DC Rec)   (defer to ortho team)   OT Rationale for DC Rec Pt SBA/Mod I with BADLs and fxl mob; supportive family- able to provide assist as needed with ADLs/IADLs.   OT Brief overview of current status SBA/Mod I   Total Session Time   Timed Code Treatment Minutes 25   Total Session Time (sum of timed and untimed services) 35     Carmel Aaron, OT 8/12/2023

## 2023-08-12 NOTE — PROGRESS NOTES
Occupational Therapy Discharge Summary    Reason for therapy discharge:    All goals and outcomes met, no further needs identified.    Progress towards therapy goal(s). See goals on Care Plan in [facility] electronic health record for goal details.  Goals met    Therapy recommendation(s):    Family assist for ADLs/I ADLs as needed.    Carmel Aaron OT 8/12/2023

## 2023-08-12 NOTE — DISCHARGE SUMMARY
ORTHOPAEDIC SURGERY DISCHARGE SUMMARY     Date of Admission: 8/11/2023  Date of Discharge: 8/12/2023  Disposition: Home  Staff Physician: Miky Jarvis MD  Primary Care Provider: Nirmal Lubin    DISCHARGE DIAGNOSIS:  Primary localized osteoarthritis of right knee [M17.11]    PROCEDURES: Procedure(s):  ARTHROPLASTY, KNEE, TOTAL on 8/11/2023    BRIEF HISTORY:  Bill Rowan I a 73 y/o male with a long history of debilitating pain secondary to ostearthritis of the knee. Despite comprehensive non-operative management these symptoms continued to interfere with activities of daily living. After discussion of further treatment options including the risks and benefits that patient elected to proceed with a total knee.     HOSPITAL COURSE:    The patient was admitted following the above listed procedures for pain control and rehabilitation. Bill Smith did well post-operatively. Medicine was consulted post operatively to aid in management of medical co-morbidities. The patient received routine nursing cares and at the time of discharge was medically stable. Vital signs were stable throughout admission. The patient is tolerating a regular diet and is voiding spontaneously. All PT/OT goals have been met for safe mobility. Pain is now controlled on oral medications which will be available on discharge. Stool softeners have been used while taking pain medications to help prevent constipation. Bill Smith is deemed medically safe to discharge.     Antibiotics:  Ancef given periop and 24 hours postop.   DVT prophylaxis:  Aspirin initiated after surgery and will be continued for 4 weeks.   PT Progress:  Has met PT/OT goals for safe mobility.    Pain Meds: 2 Weaned off all IV pain meds by discharge.  Inpatient Events: No significant events or complications.     PHYSICAL EXAM:    Gen: Awake, alert, NAD  Resp: breathing equal and non-labored  Extremities:  RLE: Toes wwp, DP 2+, bcr in all toes. Compartments soft and  tolerates passive stretch of toes. +EHL/FHL/GSC/TA with 5/5 strength. SILT SP/DP/Sa/Condon/T. Dressing c/d/i.     FOLLOWUP:    Follow up with Dr. Jarvis's team at 2 weeks postoperatively.    Future Appointments   Date Time Provider Department Center   8/12/2023  8:15 AM Tracy Aaron OT [facility]   8/18/2023  3:30 PM Zachary Duvall, PT [facility]   8/22/2023 11:30 AM [facility] [place]   8/25/2023  3:30 PM Zachary Duvall, PT [facility]   9/1/2023  3:30 PM Zachary Duvall, PT [facility]   9/6/2023  2:20 PM [facility]   9/19/2023 12:00 PM [facility] [place]   11/13/2023 12:00 PM Guillermo Rivera MD [facility]       Orthopaedic Surgery appointments are at the [facility] [place] ([address] [place] [address]). Call [phone]  to schedule a follow-up appointment at this location with your provider.     PLANNED DISCHARGE ORDERS:      Discharge Medication List as of 8/12/2023 11:35 AM        START taking these medications    Details   aspirin 81 MG EC tablet Take 1 tablet (81 mg) by mouth 2 times daily for 28 days, Disp-56 tablet, R-0, E-Prescribe      hydrOXYzine (ATARAX) 10 MG tablet Take 1 tablet (10 mg) by mouth every 6 hours as needed for other (adjuvant pain), Disp-20 tablet, R-0, E-Prescribe      ondansetron (ZOFRAN ODT) 4 MG ODT tab Take 1 tablet (4 mg) by mouth every 6 hours as needed for nausea or vomiting, Disp-8 tablet, R-0, E-Prescribe      polyethylene glycol (MIRALAX) 17 g packet Take 17 g by mouth daily, Disp-7 packet, R-0, E-Prescribe      senna-docusate (SENOKOT-S/PERICOLACE) 8.6-50 MG tablet Take 1 tablet by mouth 2 times daily, Disp-20 tablet, R-0, E-Prescribe      traMADol (ULTRAM) 50 MG tablet Take 1 tablet (50 mg) by mouth every 6 hours as needed for severe pain, Disp-15 tablet, R-0, E-Prescribe           CONTINUE these medications which have CHANGED     Details   acetaminophen (TYLENOL) 325 MG tablet Take 3 tablets (975 mg) by mouth every 8 hours, Disp-100 tablet, R-0, E-Prescribe           CONTINUE these medications which have NOT CHANGED    Details   allopurinol (ZYLOPRIM) 100 MG tablet TAKE 1 TABLET BY MOUTH TWICE DAILY FOR  GOUT, Disp-180 tablet, R-3, E-Prescribe      ascorbic acid (VITAMIN C) 1000 MG TABS Take 1,000 mg by mouth daily, Historical      bevacizumab (AVASTIN) 25 mg/mL intravitreal inj by Intravitreal route once Every 7 weeks, Historical      carvedilol (COREG) 25 MG tablet TAKE 1 TABLET BY MOUTH TWICE DAILY WITH MEALS FOR BLOOD PRESSURE, Disp-180 tablet, R-3, E-Prescribe      chlorthalidone (HYGROTON) 25 MG tablet Take 1 tablet (25 mg) by mouth every morning For blood pressure., Disp-90 tablet, R-3, E-Prescribe      fluticasone (FLONASE) 50 MCG/ACT nasal spray Spray 1 spray into both nostrils daily as needed for rhinitis or allergies, Historical      hydrALAZINE (APRESOLINE) 50 MG tablet TAKE 1 TABLET BY MOUTH THREE TIMES DAILY FOR BLOOD PRESSURE, Disp-270 tablet, R-3, E-Prescribe      indomethacin (INDOCIN) 50 MG capsule Take 1 capsule (50 mg) by mouth 3 times daily as needed for moderate pain, Disp-60 capsule,R-3, E-Prescribe      losartan (COZAAR) 100 MG tablet Take 1 tablet by mouth once daily for blood pressure, Disp-90 tablet, R-3, E-Prescribe      metFORMIN (GLUCOPHAGE) 500 MG tablet TAKE 1 TABLET BY MOUTH TWICE DAILY WITH MEALS FOR DIABETES, Disp-180 tablet, R-0, E-Prescribe      MULTIPLE VITAMIN PO Take 1 tablet by mouth daily Stopping 8/4/23 before surgery, Historical      primidone (MYSOLINE) 50 MG tablet Take 50 mg in the morning and 100 mg in the evening., Disp-270 tablet, R-1, E-Prescribe               Discharge Procedure Orders   Referral Order to Outpatient Physical Therapy   Standing Status: Future   Referral Priority: Priority: 1-2 Weeks Referral Type: Rehab Therapy Physical Therapy   Number of Visits Requested: 1     Reason  "for your hospital stay   Order Comments: Elective right total knee arthroplasty     When to call - Contact Surgeon Team   Order Comments: You may experience symptoms that require follow-up before your scheduled appointment. Refer to the \"Stoplight Tool\" for instructions on when to contact your Surgeon Team if you are concerned about pain control, blood clots, constipation, or if you are unable to urinate.     When to call - Reach out to Urgent Care   Order Comments: If you are not able to reach your Surgeon Team and you need immediate care, go to the Orthopedic Walk-in Clinic or Urgent Care at your Surgeon's office.  Do NOT go to the Emergency Room unless you have shortness of breath, chest pain, or other signs of a medical emergency.     When to call - Reasons to Call 911   Order Comments: Call 911 immediately if you experience sudden-onset chest pain, arm weakness/numbness, slurred speech, or shortness of breath     Discharge Instruction - Breathing exercises   Order Comments: Perform breathing exercises using your Incentive Spirometer 10 times per hour while awake for 2 weeks.     Symptoms - Fever Management   Order Comments: A low grade fever can be expected after surgery.  Use acetaminophen (TYLENOL) as needed for fever management.  Contact your Surgeon Team if you have a fever greater than 101.5 F, chills, and/or night sweats.     Symptoms - Constipation management   Order Comments: Constipation (hard, dry bowel movements) is expected after surgery due to the combination of being less active, the anesthetic, and the opioid pain medication.  You can do the following to help reduce constipation:  ~  FLUIDS:  Drink clear liquids (water or Gatorade), or juice (apple/prune).  ~  DIET:  Eat a fiber rich diet.    ~  ACTIVITY:  Get up and move around several times a day.  Increase your activity as you are able.  MEDICATIONS:  Reduce the risk of constipation by starting medications before you are constipated.  You can " take Miralax   (1 packet as directed) and/or a stool softener (Senokot 1-2 tablets 1-2 times a day).  If you already have constipation and these medications are not working, you can get magnesium citrate and use as directed.  If you continue to have constipation you can try an over the counter suppository or enema.  Call your Surgeon Team if it has been greater than 3 days since your last bowel movement.     Symptoms - Reduced Urine Output   Order Comments: Changes in the amount of fluids you drank before and after surgery may result in problems urinating.  It is important to stay well-hydrated after surgery and drink plenty of water. If it has been greater than 8 hours since you have urinated despite drinking plenty of water, call your Surgeon Team.     Medication instructions -  Anticoagulation - aspirin   Order Comments: Take the aspirin 81 mg twice a day as prescribed for a total of 30 days after surgery.  This is given to help minimize your risk of blood clot.     Activity - Exercises to prevent blood clots   Order Comments: Unless otherwise directed by your Surgeon team, perform the following exercises at least three times per day for the first four weeks after surgery to prevent blood clots in your legs: 1) Point and flex your feet (Ankle Pumps), 2) Move your ankle around in big circles, 3) Wiggle your toes, 4) Walk, even for short distances, several times a day, will help decrease the risk of blood clots.     Order Specific Question Answer Comments   Is discharge order? Yes      Comfort and Pain Management - Pain after Surgery   Order Comments: Pain after surgery is normal and expected.  You will have some amount of pain for several weeks after surgery.  Your pain will improve with time.  There are several things you can do to help reduce your pain including: rest, ice, elevation, and using pain medications as needed. Contact your Surgeon Team if you have pain that persists or worsens after surgery despite  "rest, ice, elevation, and taking your medication(s) as prescribed. Contact your Surgeon Team if you have new numbness, tingling, or weakness in your operative extremity.     Comfort and Pain Management - Swelling after Surgery   Order Comments: Swelling and/or bruising of the surgical extremity is common and may persist for several months after surgery. In addition to frequent icing and elevation, gentle compressive support with an ACE wrap or tubigrip may help with swelling. Apply compression regularly, removing at least twice daily to perform skin checks. Contact your Surgeon Team if your swelling increases and is NOT associated with an increase in your activity level, or if your swelling increases and is associated with redness and pain.     Comfort and Pain Management - LOWER Extremity Elevation   Order Comments: Swelling is expected for several months after surgery. This type of swelling is usually associated with gravity and activity, and can be improved with elevation.   The best way to do this is to get your \"toes above your nose\" by laying down and placing several pillows lengthwise under your calf and heel. When elevating your leg keep your knee completely straight. Perform this elevation as often as possible especially for the first two weeks after surgery.     Comfort and Pain Management - Cold therapy   Order Comments: Ice can be used to control swelling and discomfort after surgery. Place a thin towel over your operative site and apply the ice pack overtop. Leave ice pack in place for 20 minutes, then remove for 20 minutes. Repeat this 20 minutes on/20 minutes off routine as often as tolerated.     Medication Instructions - Acetaminophen (TYLENOL) Instructions   Order Comments: You were discharged with acetaminophen (TYLENOL) for pain management after surgery. Acetaminophen most effectively manages pain symptoms when it is taken on a schedule without missing doses (every four, six, or eight hours). " Your Provider will prescribe a safe daily dose between 3000 - 4000 mg.  Do NOT exceed this daily dose. Most patients use acetaminophen for pain control for the first four weeks after surgery.  You can wean from this medication as your pain decreases.     Medication Instructions - NSAID Instructions   Order Comments: You were discharged with an anti-inflammatory medication for pain management to use in combination with acetaminophen (TYLENOL) and the narcotic pain medication.  Take this medication exactly as directed.  You should only take one anti-inflammatory at a time.  Some common anti-inflammatories include: ibuprofen (ADVIL, MOTRIN), naproxen (ALEVE, NAPROSYN), celecoxib (CELEBREX), meloxicam (MOBIC), ketorolac (TORADOL).  Take this medication with food and water.     Medication Instructions - Opioid Instructions (Greater than or equal to 65 years)   Order Comments: You were discharged with an opioid medication (hydromorphone, oxycodone, hydrocodone, or tramadol). This medication should only be taken for breakthrough pain that is not controlled with acetaminophen (TYLENOL). If you rate your pain less than 3 you do not need this medication.  Pain rating 0-3:  You do not need this medication  Pain rating 4-6:  Take 1/2 tablet every 4-6 hours as needed  Pain rating 7-10:  Take 1 tablet every 4-6 hours as needed  Do not exceed 4 tablets per day     Medication Instructions - Opioids - Tapering Instructions   Order Comments: In the first three days following surgery, your symptoms may warrant use of the narcotic pain medication every four to six hours as prescribed. This is normal. As your pain symptoms improve, focus your efforts on decreasing (tapering) use of narcotic medications. The most successful tapering strategy is to first, decrease the number of tablets you take every 4-6 hours to the minimum prescribed. Then, increase the amount of time between doses.  For example:  First, taper to   or 1 tablet every 4-6  hours.  Then, taper to   or 1 tablet every 6-8 hours.  Then, taper to   or 1 tablet every 8-10 hours.  Then, taper to   or 1 tablet every 10-12 hours.  Then, taper to   or 1 tablet at bedtime.  The bedtime dose can help with comfort during sleep and is typically the last dose to be discontinued after surgery.     Follow Up Care   Order Comments: Follow-up with your Surgeon Team in 10-14 days for total knee arthroplasty surgery for wound check, dressing removal and post op check     Physical Therapy Instructions   Order Comments: Begin physical therapy within one week following surgery as scheduled.  Regaining range of motion of your knee after surgery is critical for positive surgical outcomes, decreased pain and improved function and sleep     Activity - Total Knee Arthroplasty     Order Specific Question Answer Comments   Is discharge order? Yes      Return to Driving   Order Comments: Return to driving - Driving is NOT permitted until directed by your provider. Under no circumstance are you permitted to drive while using narcotic pain medications.     Order Specific Question Answer Comments   Is discharge order? Yes      Return to School / Work   Order Comments: Return to School / Work: You may return to work/school when directed by your Provider.     Order Specific Question Answer Comments   Is discharge order? Yes      NO Precautions   Order Comments: No precautions directed by your Provider.  You may perform range of motion activities as tolerated.     Order Specific Question Answer Comments   Is discharge order? Yes      Weight bearing as tolerated   Order Comments: Weight bearing as tolerated on your operative extremity. Use a walker for safety at all times and transition to a cane when safe and directed by physical therapy     Order Specific Question Answer Comments   Is discharge order? Yes      Dressing / Wound Care - Wound   Order Comments: You have a clean dressing on your surgical wound. Dressing  change instructions as follows: DO NOT REMOVE YOUR POST OPERATIVE BANDAGE.  Contact your Surgeon Team if you have increased redness, warmth around the surgical wound, and/or drainage from the surgical wound.     Dressing / Wound care - Shower with wound/dressing covered   Order Comments: You must COVER your dressing or incision with PRESS 'N' SEAL wrap (or any other non-permeable covering) to allow the incision to remain dry while showering.  You may shower 2 days after surgery as long as the surgical wound stays dry. Continue to cover your dressing or incision for showering until your first office visit.  You are strictly prohibited from soaking   or submerging the surgical wound underwater.     Dressing / Wound Care - NO Tub Bathing   Order Comments: Tub bathing, swimming, or any other activities that will cause your incision to be submerged in water should be avoided until allowed by your Surgeon.     Discharge Instruction - Regular Diet Adult   Order Comments: Return to your pre-surgery diet unless instructed otherwise     Order Specific Question Answer Comments   Is discharge order? Yes        LYNN LEO PA-C  8/12/2023 7:47 AM   Orthopaedic Surgery

## 2023-08-12 NOTE — PLAN OF CARE
Patient vital signs are at baseline: Yes  Patient able to ambulate as they were prior to admission or with assist devices provided by therapies during their stay:  Yes  Patient MUST void prior to discharge:  Yes  Patient able to tolerate oral intake:  Yes  Pain has adequate pain control using Oral analgesics:  Yes  Does patient have an identified :  Yes  Has goal D/C date and time been discussed with patient:  Yes    Alert and oriented x4. Able to make needs known via call light. Pt ambulating well. NO PRN oxycodone or dilaudid given during shift d/t pt being concerned about dizziness and nausea. VSS.

## 2023-08-12 NOTE — DISCHARGE INSTRUCTIONS
Call the [facility] at [phone] during business hours for any symptoms such as:    * Fevers with Temperature greater than 101.5 degrees.   * Pus drainage from wound site.   * Severe pain, not controlled by medication.   * Persistent nausea, vomiting and inablility to tolerate fluids.    FOR URGENT PROBLEMS ONLY, after hours or on weekends call the hospital  at [phone] and ask to speak with the orthopedic resident on call.

## 2023-08-12 NOTE — PLAN OF CARE
Iv pulled, vitally stable. AVS given and questions answered  Sharonda Cope RN

## 2023-08-12 NOTE — PROGRESS NOTES
"Orthopedic Surgery Progress Note   8/12/2023    Subjective: No acute events overnight. Pain well controlled. Tolerating diet. Voiding spontaneously. +Flatus, no BM. Denies fever or chills, CP, SOB, numbness or tingling, motor dysfunction or weakness.     Exam:  BP (!) 146/71 (BP Location: Right arm)   Pulse 87   Temp 98.2  F (36.8  C) (Oral)   Resp 17   Ht 1.854 m (6' 1\")   Wt 110.1 kg (242 lb 11.2 oz)   SpO2 92%   BMI 32.02 kg/m    Gen: Awake, alert, NAD  Resp: breathing equal and non-labored  Extremities:  RLE: Toes wwp, DP 2+, bcr in all toes. Compartments soft and tolerates passive stretch of toes. +EHL/FHL/GSC/TA with 5/5 strength. SILT SP/DP/Sa/Condon/T. Dressing c/d/i.     Labs:    Recent Labs   Lab 08/12/23  0716 08/08/23  1319   WBC  --  10.3   HGB 12.0* 14.8   PLT  --  229     Recent Labs   Lab 08/12/23  0633 08/11/23  2140 08/11/23  1701 08/11/23  1218 08/11/23  0808 08/11/23  0803 08/08/23  1319   NA  --   --   --   --   --   --  142   POTASSIUM  --   --   --   --  3.2*  --  3.3*   CHLORIDE  --   --   --   --   --   --  104   CO2  --   --   --   --   --   --  26   BUN  --   --   --   --   --   --  10.3   CR  --   --   --   --   --   --  0.77   * 263* 276* 194*  --    < > 144*    < > = values in this interval not displayed.     No lab results found in last 7 days.  No lab results found in last 7 days.    Invalid input(s): ESR    Assessment:   Bill Smith is a 72 year old male now s/p right TKA on 8/11/23 with Dr. Jarvis. Doing well post-operatively.     Plan:  Ortho Primary  Activity: Up with assist until independent.  Weight bearing status: WBAT  Pain management: Transition from IV to PO as tolerated.    Antibiotics: Ancef x 24 hours.  Diet: Begin with clear fluids and progress diet as tolerated.   DVT prophylaxis: Aspirin and mechanical while in the hospital, discharge on Aspirin 162 mg x 4 weeks..  Labs: Hgb POD#1.  Dressings: Keep clean, dry and intact until follow up.   Physical " Therapy/Occupational Therapy: Eval and treat.  Consults: Internal Medicine.  Follow-up: Clinic with Dr. Jarvis's team in 2 weeks     Disposition: Pending progress with therapies, pain control on orals, and medical stability, anticipate discharge to Home  on POD #1.    LYNN LEO PA-C  8/12/2023 7:43 AM  Orthopaedic Surgery     Thank you for allowing me to participate in this patient's care. Please page me directly any questions/concerns.   Securely message with the Vocera Web Console (learn more here)  Text page via [employer] Paging/Directory    If there is no response, if it is a weekend, or if it is during evening hours, please page the orthopaedic surgery resident on call via [employer] Paging/Directory

## 2023-08-12 NOTE — PROGRESS NOTES
[facility]    Medicine Progress Note - Hospitalist Service    Date of Admission:  8/11/2023    Assessment & Plan   Bill Smith is a 72 year old male admitted on 8/11/2023.  He has a past medical history significant for diabetes, hypertension and gout who presented to the hospital for elective right total knee arthroplasty.  Hospitalist medicine consulted for management of chronic medical conditions.       Hypertension  -At home on Coreg 25 mg twice daily, chlorthalidone 25 mg, hydralazine 50 mg 2 times daily, losartan 100 mg daily  -Patient was resumed on Coreg and hydralazine on presentation.  Chlorthalidone and losartan were held  -Blood pressures currently around 130/70    Plan:  [] Discussed with the patient and his family.  Continue Coreg and hydralazine for today.  Hold losartan and chlorthalidone until tomorrow 8/13.  Continue to monitor blood pressure and resume losartan and chlorthalidone if blood pressure above 140/80.  [] Follow-up with primary care physician in 1 week.    Diabetes  -At home on metformin 500 mg twice daily    Plan:  [] Resume home metformin on discharge    Hand tremor  -Primidone for hand tremor.    Plan:  [] Continue home medication.      Mild hypokalemia  -Potassium around 3.3.  -Given 40 mEq of potassium on 8/12.  -Mild hypokalemia etiology is unclear, possibly related to diuretic use.    Plan:  [] Follow-up with primary care physician next week to check BMP.          Diet: Discharge Instruction - Regular Diet Adult  Regular Diet Adult    DVT Prophylaxis: Defer to primary service  Lopez Catheter: Not present  Lines: None     Cardiac Monitoring: None  Code Status: Full Code          Disposition Plan discharge home     Expected Discharge Date: 08/12/2023, 12:00 PM                MARCIN VILLEDA MD  Hospitalist Service  [facility]  Securely message with [employer] (more info)  Text page via wishkicker [email or web address]    ______________________________________________________________________    Interval History   No significant events.  Some nausea and abdominal discomfort after receiving oxycodone on 8/12.  Symptoms has resolved after stopping oxycodone.  Denies any chest pain or shortness of breath.  Denies any lightheadedness.  Denies any significant right knee pain.       Physical Exam   Vital Signs: Temp: 98.8  F (37.1  C) Temp src: Oral BP: 131/70 Pulse: 91   Resp: 17 SpO2: 92 % O2 Device: None (Room air) Oxygen Delivery: 2 LPM  Weight: 242 lbs 11.2 oz    Physical Exam  Constitutional:       General: He is not in acute distress.     Appearance: He is not toxic-appearing.   Cardiovascular:      Rate and Rhythm: Normal rate.   Pulmonary:      Effort: Pulmonary effort is normal. No respiratory distress.      Breath sounds: No wheezing.   Skin:     General: Skin is warm and dry.   Neurological:      Mental Status: He is alert.   Psychiatric:         Mood and Affect: Mood normal.          Medical Decision Making       40 MINUTES SPENT BY ME on the date of service doing chart review, history, exam, documentation & further activities per the note.      Data     I have personally reviewed the following data over the past 24 hrs:    N/A  \   12.0 (L)   / N/A     N/A N/A N/A /  132 (H)   3.3 (L) N/A N/A \       Imaging results reviewed over the past 24 hrs:   Recent Results (from the past 24 hour(s))   XR Knee Port Right 1/2 Views    Narrative    EXAM: XR KNEE PORT RIGHT 1/2 VIEWS  LOCATION: [facility]  DATE: 8/11/2023    INDICATION: Post Op Total Knee  COMPARISON: Right knee radiographs 07/25/2023.      Impression    IMPRESSION: Right TKA. Negative for postoperative purposes.

## 2023-08-14 ASSESSMENT — ACTIVITIES OF DAILY LIVING (ADL)
SQUAT: I AM UNABLE TO DO THE ACTIVITY
KNEEL ON THE FRONT OF YOUR KNEE: I AM UNABLE TO DO THE ACTIVITY
SIT WITH YOUR KNEE BENT: ACTIVITY IS FAIRLY DIFFICULT
WEAKNESS: THE SYMPTOM AFFECTS MY ACTIVITY MODERATELY
GIVING WAY, BUCKLING OR SHIFTING OF KNEE: THE SYMPTOM AFFECTS MY ACTIVITY MODERATELY
STAND: ACTIVITY IS SOMEWHAT DIFFICULT
KNEE_ACTIVITY_OF_DAILY_LIVING_SUM: 21
STIFFNESS: THE SYMPTOM AFFECTS MY ACTIVITY SEVERELY
AS_A_RESULT_OF_YOUR_KNEE_INJURY,_HOW_WOULD_YOU_RATE_YOUR_CURRENT_LEVEL_OF_DAILY_ACTIVITY?: SEVERELY ABNORMAL
KNEE_ACTIVITY_OF_DAILY_LIVING_SCORE: 30
RAW_SCORE: 21
WALK: ACTIVITY IS VERY DIFFICULT
RISE FROM A CHAIR: ACTIVITY IS FAIRLY DIFFICULT
GO DOWN STAIRS: ACTIVITY IS FAIRLY DIFFICULT
SWELLING: THE SYMPTOM AFFECTS MY ACTIVITY SEVERELY
GO UP STAIRS: ACTIVITY IS VERY DIFFICULT
HOW_WOULD_YOU_RATE_THE_OVERALL_FUNCTION_OF_YOUR_KNEE_DURING_YOUR_USUAL_DAILY_ACTIVITIES?: SEVERELY ABNORMAL
PAIN: THE SYMPTOM AFFECTS MY ACTIVITY MODERATELY
LIMPING: THE SYMPTOM AFFECTS MY ACTIVITY MODERATELY

## 2023-08-18 ENCOUNTER — THERAPY VISIT (OUTPATIENT)
Dept: PHYSICAL THERAPY | Facility: CLINIC | Age: 73
End: 2023-08-18
Attending: ORTHOPAEDIC SURGERY
Payer: MEDICARE

## 2023-08-18 DIAGNOSIS — M25.561 ACUTE PAIN OF RIGHT KNEE: ICD-10-CM

## 2023-08-18 DIAGNOSIS — Z47.1 AFTERCARE FOLLOWING KNEE JOINT REPLACEMENT SURGERY, UNSPECIFIED LATERALITY: Primary | ICD-10-CM

## 2023-08-18 DIAGNOSIS — Z96.659 STATUS POST TOTAL KNEE REPLACEMENT, UNSPECIFIED LATERALITY: ICD-10-CM

## 2023-08-18 DIAGNOSIS — R26.9 ABNORMAL GAIT: ICD-10-CM

## 2023-08-18 DIAGNOSIS — Z96.659 AFTERCARE FOLLOWING KNEE JOINT REPLACEMENT SURGERY, UNSPECIFIED LATERALITY: Primary | ICD-10-CM

## 2023-08-18 DIAGNOSIS — R60.0 LOCALIZED EDEMA: ICD-10-CM

## 2023-08-18 PROCEDURE — 97161 PT EVAL LOW COMPLEX 20 MIN: CPT | Mod: GP

## 2023-08-18 PROCEDURE — 97140 MANUAL THERAPY 1/> REGIONS: CPT | Mod: GP

## 2023-08-18 PROCEDURE — 97016 VASOPNEUMATIC DEVICE THERAPY: CPT | Mod: GP

## 2023-08-18 NOTE — PROGRESS NOTES
PHYSICAL THERAPY EVALUATION  Type of Visit: Evaluation    See electronic medical record for Abuse and Falls Screening details.    Subjective   One week ago today had the surgery. Has had many surgerys but says this has been the worst experience so far. Pt reports that it is painful, gets little spasms of pain 8-9/10 comes for a second with movement. If sitting in the chair without movement, 0/10 pain, but is uncomfortable. Lift chair needs to go very slowly because any slow movement causes pain. Is able to stand and walk with a walker and is doing ankle pumps, and knee strengthening exercises      Presenting condition or subjective complaint:    Date of onset: 08/11/23    Relevant medical history:     Dates & types of surgery:      Prior diagnostic imaging/testing results: MRI; X-ray     Prior therapy history for the same diagnosis, illness or injury: No      Prior Level of Function  Transfers: Independent  Ambulation: Independent  ADL: Independent  IADL:     Living Environment  Social support: With a significant other or spouse   Type of home: House   Stairs to enter the home: Yes 3 Is there a railing: Yes   Ramp: No   Stairs inside the home: Yes 14     Help at home: Home management tasks (cooking, cleaning); Assist for driving and community activities  Equipment owned: Raised toilet seat     Employment: No    Hobbies/Interests:      Patient goals for therapy:  build strength and return to normal activity without pain    Pain assessment: See objective evaluation for additional pain details     Objective   KNEE EVALUATION  PAIN: Pain Level at Rest: 0/10, Pain level at highest 8-9/10  INTEGUMENTARY (edema, incisions): WNL, circumference; L: superior patella: 46 cm, inferior: 38 cm. R superior patella: 53 cm, R inferior: 44 cm. Good pulses, no increased redness, no increased warmth, negative  homans  POSTURE:   GAIT:  Weightbearing Status: WBAT  Assistive Device(s): Walker (front wheeled)  Gait Deviations:    BALANCE/PROPRIOCEPTION:   WEIGHTBEARING ALIGNMENT:   NON-WEIGHTBEARING ALIGNMENT:   ROM:   R knee flexion: 72 degrees, extension: 20 degrees  End feel:       STRENGTH: Quad set good. Iso hamstring delayed cnotraction. SLR extension lag   Pulse:   FLEXIBILITY:   SPECIAL TESTS:   FUNCTIONAL TESTS:   PALPATION:       Assessment & Plan   CLINICAL IMPRESSIONS  Medical Diagnosis: s/p R tka    Treatment Diagnosis: s/p R TKA   Impression/Assessment: Patient is a 72 year old male s/p R TKA.  The following significant findings have been identified: Pain, Decreased ROM/flexibility, Decreased strength, Inflammation, and Edema. These impairments interfere with their ability to perform self care tasks, recreational activities, household chores, driving , household mobility, and community mobility as compared to previous level of function.     Clinical Decision Making (Complexity):  Clinical Presentation: Stable/Uncomplicated  Clinical Presentation Rationale: based on medical and personal factors listed in PT evaluation  Clinical Decision Making (Complexity): Low complexity    PLAN OF CARE  Treatment Interventions:  Modalities: Vasoneumatic Device  Interventions: Gait Training, Manual Therapy, Neuromuscular Re-education, Therapeutic Activity, Therapeutic Exercise    Long Term Goals         Frequency of Treatment: 15 visits over 12 weeks  Duration of Treatment: 12 weeks    Recommended Referrals to Other Professionals:  none  Education Assessment:   Learner/Method: Patient;Significant Other;No Barriers to Learning    Risks and benefits of evaluation/treatment have been explained.   Patient/Family/caregiver agrees with Plan of Care.     Evaluation Time:     PT Eval, Low Complexity Minutes (62630): 18   Present: Not applicable     Signing Clinician: Zachary Duvall, PT, Juanito Bell, [facility]                                                                                    OUTPATIENT PHYSICAL THERAPY      PLAN OF TREATMENT FOR OUTPATIENT REHABILITATION   Patient's Last Name, First Name, Bill Perez YOB: 1950   Provider's Name   [facility]   Medical Record No.  [id number]     Onset Date: 08/11/23  Start of Care Date: 08/18/23     Medical Diagnosis:  s/p R tka      PT Treatment Diagnosis:  s/p R TKA Plan of Treatment  Frequency/Duration: 15 visits over 12 weeks/ 12 weeks    Certification date from 08/18/23 to 11/20/23         See note for plan of treatment details and functional goals     Zachary Duvall, PT                         I CERTIFY THE NEED FOR THESE SERVICES FURNISHED UNDER        THIS PLAN OF TREATMENT AND WHILE UNDER MY CARE     (Physician attestation of this document indicates review and certification of the therapy plan).                Referring Provider:  Miky Jarvis      Initial Assessment  See Epic Evaluation- Start of Care Date: 08/18/23

## 2023-08-22 ENCOUNTER — ALLIED HEALTH/NURSE VISIT (OUTPATIENT)
Dept: NURSING | Facility: CLINIC | Age: 73
End: 2023-08-22
Payer: MEDICARE

## 2023-08-22 DIAGNOSIS — Z96.651 STATUS POST TOTAL RIGHT KNEE REPLACEMENT: Primary | ICD-10-CM

## 2023-08-22 PROCEDURE — 99207 PR NO CHARGE NURSE ONLY: CPT

## 2023-08-25 ENCOUNTER — THERAPY VISIT (OUTPATIENT)
Dept: PHYSICAL THERAPY | Facility: CLINIC | Age: 73
End: 2023-08-25
Payer: MEDICARE

## 2023-08-25 DIAGNOSIS — Z47.1 AFTERCARE FOLLOWING KNEE JOINT REPLACEMENT SURGERY, UNSPECIFIED LATERALITY: Primary | ICD-10-CM

## 2023-08-25 DIAGNOSIS — M25.561 ACUTE PAIN OF RIGHT KNEE: ICD-10-CM

## 2023-08-25 DIAGNOSIS — R26.9 ABNORMAL GAIT: ICD-10-CM

## 2023-08-25 DIAGNOSIS — Z96.659 AFTERCARE FOLLOWING KNEE JOINT REPLACEMENT SURGERY, UNSPECIFIED LATERALITY: Primary | ICD-10-CM

## 2023-08-25 DIAGNOSIS — R60.0 LOCALIZED EDEMA: ICD-10-CM

## 2023-08-25 PROCEDURE — 97140 MANUAL THERAPY 1/> REGIONS: CPT | Mod: GP | Performed by: PHYSICAL THERAPY ASSISTANT

## 2023-08-25 PROCEDURE — 97110 THERAPEUTIC EXERCISES: CPT | Mod: GP | Performed by: PHYSICAL THERAPY ASSISTANT

## 2023-09-01 ENCOUNTER — THERAPY VISIT (OUTPATIENT)
Dept: PHYSICAL THERAPY | Facility: CLINIC | Age: 73
End: 2023-09-01
Attending: ORTHOPAEDIC SURGERY
Payer: MEDICARE

## 2023-09-01 DIAGNOSIS — R60.0 LOCALIZED EDEMA: ICD-10-CM

## 2023-09-01 DIAGNOSIS — M25.561 ACUTE PAIN OF RIGHT KNEE: ICD-10-CM

## 2023-09-01 DIAGNOSIS — Z96.659 AFTERCARE FOLLOWING KNEE JOINT REPLACEMENT SURGERY, UNSPECIFIED LATERALITY: Primary | ICD-10-CM

## 2023-09-01 DIAGNOSIS — R26.9 ABNORMAL GAIT: ICD-10-CM

## 2023-09-01 DIAGNOSIS — Z47.1 AFTERCARE FOLLOWING KNEE JOINT REPLACEMENT SURGERY, UNSPECIFIED LATERALITY: Primary | ICD-10-CM

## 2023-09-01 PROCEDURE — 97530 THERAPEUTIC ACTIVITIES: CPT | Mod: GP | Performed by: PHYSICAL THERAPIST

## 2023-09-01 PROCEDURE — 97140 MANUAL THERAPY 1/> REGIONS: CPT | Mod: GP | Performed by: PHYSICAL THERAPIST

## 2023-09-01 PROCEDURE — 97016 VASOPNEUMATIC DEVICE THERAPY: CPT | Mod: GP | Performed by: PHYSICAL THERAPIST

## 2023-09-01 PROCEDURE — 97110 THERAPEUTIC EXERCISES: CPT | Mod: GP | Performed by: PHYSICAL THERAPIST

## 2023-09-05 ENCOUNTER — THERAPY VISIT (OUTPATIENT)
Dept: PHYSICAL THERAPY | Facility: CLINIC | Age: 73
End: 2023-09-05
Payer: MEDICARE

## 2023-09-05 DIAGNOSIS — R26.9 ABNORMAL GAIT: ICD-10-CM

## 2023-09-05 DIAGNOSIS — Z47.1 AFTERCARE FOLLOWING KNEE JOINT REPLACEMENT SURGERY, UNSPECIFIED LATERALITY: Primary | ICD-10-CM

## 2023-09-05 DIAGNOSIS — M25.561 ACUTE PAIN OF RIGHT KNEE: ICD-10-CM

## 2023-09-05 DIAGNOSIS — R60.0 LOCALIZED EDEMA: ICD-10-CM

## 2023-09-05 DIAGNOSIS — Z96.659 AFTERCARE FOLLOWING KNEE JOINT REPLACEMENT SURGERY, UNSPECIFIED LATERALITY: Primary | ICD-10-CM

## 2023-09-05 PROCEDURE — 97016 VASOPNEUMATIC DEVICE THERAPY: CPT | Mod: GP

## 2023-09-05 PROCEDURE — 97140 MANUAL THERAPY 1/> REGIONS: CPT | Mod: GP

## 2023-09-05 PROCEDURE — 97110 THERAPEUTIC EXERCISES: CPT | Mod: GP

## 2023-09-05 PROCEDURE — 97530 THERAPEUTIC ACTIVITIES: CPT | Mod: GP

## 2023-09-07 ENCOUNTER — MYC MEDICAL ADVICE (OUTPATIENT)
Dept: FAMILY MEDICINE | Facility: CLINIC | Age: 73
End: 2023-09-07
Payer: MEDICARE

## 2023-09-07 DIAGNOSIS — M25.561 PAIN IN BOTH KNEES, UNSPECIFIED CHRONICITY: ICD-10-CM

## 2023-09-07 DIAGNOSIS — M25.562 PAIN IN BOTH KNEES, UNSPECIFIED CHRONICITY: ICD-10-CM

## 2023-09-07 RX ORDER — INDOMETHACIN 50 MG/1
50 CAPSULE ORAL 3 TIMES DAILY PRN
Qty: 60 CAPSULE | Refills: 3 | Status: SHIPPED | OUTPATIENT
Start: 2023-09-07 | End: 2023-10-06

## 2023-09-13 ENCOUNTER — THERAPY VISIT (OUTPATIENT)
Dept: PHYSICAL THERAPY | Facility: CLINIC | Age: 73
End: 2023-09-13
Payer: MEDICARE

## 2023-09-13 DIAGNOSIS — Z47.1 AFTERCARE FOLLOWING KNEE JOINT REPLACEMENT SURGERY, UNSPECIFIED LATERALITY: Primary | ICD-10-CM

## 2023-09-13 DIAGNOSIS — R60.0 LOCALIZED EDEMA: ICD-10-CM

## 2023-09-13 DIAGNOSIS — M25.561 ACUTE PAIN OF RIGHT KNEE: ICD-10-CM

## 2023-09-13 DIAGNOSIS — Z96.659 AFTERCARE FOLLOWING KNEE JOINT REPLACEMENT SURGERY, UNSPECIFIED LATERALITY: Primary | ICD-10-CM

## 2023-09-13 DIAGNOSIS — R26.9 ABNORMAL GAIT: ICD-10-CM

## 2023-09-13 PROCEDURE — 97016 VASOPNEUMATIC DEVICE THERAPY: CPT | Mod: GP | Performed by: PHYSICAL THERAPY ASSISTANT

## 2023-09-13 PROCEDURE — 97530 THERAPEUTIC ACTIVITIES: CPT | Mod: GP | Performed by: PHYSICAL THERAPY ASSISTANT

## 2023-09-13 PROCEDURE — 97110 THERAPEUTIC EXERCISES: CPT | Mod: GP | Performed by: PHYSICAL THERAPY ASSISTANT

## 2023-09-14 DIAGNOSIS — Z96.651 S/P TOTAL KNEE ARTHROPLASTY, RIGHT: Primary | ICD-10-CM

## 2023-09-19 ENCOUNTER — OFFICE VISIT (OUTPATIENT)
Dept: ORTHOPEDICS | Facility: CLINIC | Age: 73
End: 2023-09-19
Payer: MEDICARE

## 2023-09-19 ENCOUNTER — ANCILLARY PROCEDURE (OUTPATIENT)
Dept: GENERAL RADIOLOGY | Facility: CLINIC | Age: 73
End: 2023-09-19
Attending: ORTHOPAEDIC SURGERY
Payer: MEDICARE

## 2023-09-19 DIAGNOSIS — Z96.651 S/P TOTAL KNEE ARTHROPLASTY, RIGHT: ICD-10-CM

## 2023-09-19 DIAGNOSIS — M17.11 PRIMARY LOCALIZED OSTEOARTHRITIS OF RIGHT KNEE: Primary | ICD-10-CM

## 2023-09-19 PROCEDURE — 99024 POSTOP FOLLOW-UP VISIT: CPT | Performed by: ORTHOPAEDIC SURGERY

## 2023-09-19 PROCEDURE — 73562 X-RAY EXAM OF KNEE 3: CPT | Mod: RT | Performed by: RADIOLOGY

## 2023-09-19 ASSESSMENT — PAIN SCALES - GENERAL: PAINLEVEL: MILD PAIN (2)

## 2023-09-19 NOTE — LETTER
9/19/2023         RE: Bill Smith  [address]  [place] [address]        Dear Colleague,    Thank you for referring your patient, Bill Smith, to the [facility]. Please see a copy of my visit note below.    Chief Complaint: Surgical Followup (6wk s.p RTKA 8/11/23)     HPI: Bill Smith returns today in follow-up for of right total knee arthroplasty performed 6 weeks ago. He states that in the first two weeks after the procedure he had a gout flare in his right foot and had difficulty with swelling and pain. Since that time he has had improvement in his pain as well as his motion. He is working with PT and making progress with his PT but notes stiffness in the extremity. He is ambulating with a cane. He can ambulate 300 yards but notes fatigue in the right knee    Pain medication: Tylenol and ibuprofen   Assist device:Ambulates with a cane   Physical therapy: He is doing PT and has an appointment tomorrow    Medications and allergies are documented in the EMR and have been reviewed.      Current Outpatient Medications:      acetaminophen (TYLENOL) 325 MG tablet, Take 3 tablets (975 mg) by mouth every 8 hours, Disp: 100 tablet, Rfl: 0     allopurinol (ZYLOPRIM) 100 MG tablet, TAKE 1 TABLET BY MOUTH TWICE DAILY FOR  GOUT, Disp: 180 tablet, Rfl: 3     ascorbic acid (VITAMIN C) 1000 MG TABS, Take 1,000 mg by mouth daily, Disp: , Rfl:      bevacizumab (AVASTIN) 25 mg/mL intravitreal inj, by Intravitreal route once Every 7 weeks, Disp: , Rfl:      carvedilol (COREG) 25 MG tablet, TAKE 1 TABLET BY MOUTH TWICE DAILY WITH MEALS FOR BLOOD PRESSURE, Disp: 180 tablet, Rfl: 3     chlorthalidone (HYGROTON) 25 MG tablet, Take 1 tablet (25 mg) by mouth every morning For blood pressure., Disp: 90 tablet, Rfl: 3     fluticasone (FLONASE) 50 MCG/ACT nasal spray, Spray 1 spray into both nostrils daily as needed for rhinitis or allergies, Disp: , Rfl:      hydrALAZINE (APRESOLINE) 50  MG tablet, TAKE 1 TABLET BY MOUTH THREE TIMES DAILY FOR BLOOD PRESSURE (Patient taking differently: Take 50 mg by mouth 2 times daily), Disp: 270 tablet, Rfl: 3     hydrOXYzine (ATARAX) 10 MG tablet, Take 1 tablet (10 mg) by mouth every 8 hours as needed for other (adjuvant pain), Disp: 20 tablet, Rfl: 0     indomethacin (INDOCIN) 50 MG capsule, Take 1 capsule (50 mg) by mouth 3 times daily as needed for moderate pain, Disp: 60 capsule, Rfl: 3     losartan (COZAAR) 100 MG tablet, Take 1 tablet by mouth once daily for blood pressure, Disp: 90 tablet, Rfl: 3     metFORMIN (GLUCOPHAGE) 500 MG tablet, TAKE 1 TABLET BY MOUTH TWICE DAILY WITH MEALS FOR DIABETES, Disp: 180 tablet, Rfl: 0     MULTIPLE VITAMIN PO, Take 1 tablet by mouth daily Stopping 8/4/23 before surgery, Disp: , Rfl:      ondansetron (ZOFRAN ODT) 4 MG ODT tab, Take 1 tablet (4 mg) by mouth every 6 hours as needed for nausea or vomiting, Disp: 8 tablet, Rfl: 0     polyethylene glycol (MIRALAX) 17 g packet, Take 17 g by mouth daily, Disp: 7 packet, Rfl: 0     primidone (MYSOLINE) 50 MG tablet, Take 50 mg in the morning and 100 mg in the evening., Disp: 270 tablet, Rfl: 1     senna-docusate (SENOKOT-S/PERICOLACE) 8.6-50 MG tablet, Take 1 tablet by mouth 2 times daily, Disp: 20 tablet, Rfl: 0     traMADol (ULTRAM) 50 MG tablet, Take 1 tablet (50 mg) by mouth every 6 hours as needed for severe pain, Disp: 15 tablet, Rfl: 0    Allergies: [place]erm[place] grass extract, Cats, Grass pollen(k-[id number]-swt gisel), Lisinopril, Molds & smuts, and Shellfish allergy    Current Status:  Global Mental Health Score: (P) 18  Global Physical Health Score: (P) 13  PROMIS TOTAL - SUBSCORES: (P) 31  UCLA: 3 , KOOSJR: 52.47     Physical Exam:  On physical examination the patient appears the stated age, is in no acute distress, affect is appropriate, and breathing is non-labored.    There were no vitals taken for this visit.  There is no height or weight on file to calculate BMI.    Rises  from chair: with some difficulty   Gait: Mildly antalgic   Appearance: midline incision is well healed   Clinical alignment: neutral   Effusion: mild   Tenderness to palpation: No joint line tenderness  Extension: 3  Flexion: 95 degrees   Collateral ligaments: intact    Cruciate ligaments: grossly intact     Stable in in the sagittal plane in mid-flexion.    Distally, the circulatory, motor, and sensation exam is intact with 5/5 EHL, gastroc-soleus, and tibialis anterior.  Sensation to light touch is intact.  Dorsalis pedis and posterior tibialis pulses are palpable.  There are no sores on the feet, no bruising, and no lymphedema.    Radiographs of the right knee reviewed and there is a TKA in place in stable position. No signs of loosening.     Assessment: Bill is 6 weeks status post right TKA. He is making progress. He does have some stiffness with flexion. Discussed the importance of improving his ROM and he will continue with work with PT. Provided prescription of tramadol to use to work on his motion. He will follow up in three weeks to recheck his ROM. The patient verbalized agreement and understanding of this plan.      Plan:   - Continue to work on ROM of the right knee   - Continue with therapy with a goal of 110 degrees when the patient returns for follow up   - Short prescription for tramadol to use prior to his ROM exercises  - Follow up in three weeks to check his knee motion.     No ref. provider found      I have personally examined this patient and have reviewed the clinical presentation and progress note with the resident. I agree with the treatment plan as outlined. The plan was formulated with the resident on the day of the resident's dictation.      Again, thank you for allowing me to participate in the care of your patient.        Sincerely,        Miky Jarvis MD

## 2023-09-19 NOTE — PATIENT INSTRUCTIONS
Thanks for coming today.  [facility]  [address] [place] [address]    To schedule future appointments in Ortho Clinic, you may call [phone].    To schedule ordered imaging or an injection ordered by your provider:  Call Central Imaging Injection scheduling line: [phone]    MyChart available online at:  [email or web address]    Please call if any further questions or concerns ([phone]).  Clinic hours 8 am to 5 pm.    Return to clinic (call) if symptoms worsen or fail to improve.

## 2023-09-19 NOTE — PROGRESS NOTES
Chief Complaint: Surgical Followup (6wk s.p RTKA 8/11/23)     HPI: Bill Smith returns today in follow-up for of right total knee arthroplasty performed 6 weeks ago. He states that in the first two weeks after the procedure he had a gout flare in his right foot and had difficulty with swelling and pain. Since that time he has had improvement in his pain as well as his motion. He is working with PT and making progress with his PT but notes stiffness in the extremity. He is ambulating with a cane. He can ambulate 300 yards but notes fatigue in the right knee    Pain medication: Tylenol and ibuprofen   Assist device:Ambulates with a cane   Physical therapy: He is doing PT and has an appointment tomorrow    Medications and allergies are documented in the EMR and have been reviewed.      Current Outpatient Medications:     acetaminophen (TYLENOL) 325 MG tablet, Take 3 tablets (975 mg) by mouth every 8 hours, Disp: 100 tablet, Rfl: 0    allopurinol (ZYLOPRIM) 100 MG tablet, TAKE 1 TABLET BY MOUTH TWICE DAILY FOR  GOUT, Disp: 180 tablet, Rfl: 3    ascorbic acid (VITAMIN C) 1000 MG TABS, Take 1,000 mg by mouth daily, Disp: , Rfl:     bevacizumab (AVASTIN) 25 mg/mL intravitreal inj, by Intravitreal route once Every 7 weeks, Disp: , Rfl:     carvedilol (COREG) 25 MG tablet, TAKE 1 TABLET BY MOUTH TWICE DAILY WITH MEALS FOR BLOOD PRESSURE, Disp: 180 tablet, Rfl: 3    chlorthalidone (HYGROTON) 25 MG tablet, Take 1 tablet (25 mg) by mouth every morning For blood pressure., Disp: 90 tablet, Rfl: 3    fluticasone (FLONASE) 50 MCG/ACT nasal spray, Spray 1 spray into both nostrils daily as needed for rhinitis or allergies, Disp: , Rfl:     hydrALAZINE (APRESOLINE) 50 MG tablet, TAKE 1 TABLET BY MOUTH THREE TIMES DAILY FOR BLOOD PRESSURE (Patient taking differently: Take 50 mg by mouth 2 times daily), Disp: 270 tablet, Rfl: 3    hydrOXYzine (ATARAX) 10 MG tablet, Take 1 tablet (10 mg) by mouth every 8 hours as needed for other  (adjuvant pain), Disp: 20 tablet, Rfl: 0    indomethacin (INDOCIN) 50 MG capsule, Take 1 capsule (50 mg) by mouth 3 times daily as needed for moderate pain, Disp: 60 capsule, Rfl: 3    losartan (COZAAR) 100 MG tablet, Take 1 tablet by mouth once daily for blood pressure, Disp: 90 tablet, Rfl: 3    metFORMIN (GLUCOPHAGE) 500 MG tablet, TAKE 1 TABLET BY MOUTH TWICE DAILY WITH MEALS FOR DIABETES, Disp: 180 tablet, Rfl: 0    MULTIPLE VITAMIN PO, Take 1 tablet by mouth daily Stopping 8/4/23 before surgery, Disp: , Rfl:     ondansetron (ZOFRAN ODT) 4 MG ODT tab, Take 1 tablet (4 mg) by mouth every 6 hours as needed for nausea or vomiting, Disp: 8 tablet, Rfl: 0    polyethylene glycol (MIRALAX) 17 g packet, Take 17 g by mouth daily, Disp: 7 packet, Rfl: 0    primidone (MYSOLINE) 50 MG tablet, Take 50 mg in the morning and 100 mg in the evening., Disp: 270 tablet, Rfl: 1    senna-docusate (SENOKOT-S/PERICOLACE) 8.6-50 MG tablet, Take 1 tablet by mouth 2 times daily, Disp: 20 tablet, Rfl: 0    traMADol (ULTRAM) 50 MG tablet, Take 1 tablet (50 mg) by mouth every 6 hours as needed for severe pain, Disp: 15 tablet, Rfl: 0    Allergies: [place] grass extract, Cats, Grass pollen(k-o-r-t-swt gisel), Lisinopril, Molds & smuts, and Shellfish allergy    Current Status:  Global Mental Health Score: (P) 18  Global Physical Health Score: (P) 13  PROMIS TOTAL - SUBSCORES: (P) 31  UCLA: 3 , KOOSJR: 52.47     Physical Exam:  On physical examination the patient appears the stated age, is in no acute distress, affect is appropriate, and breathing is non-labored.    There were no vitals taken for this visit.  There is no height or weight on file to calculate BMI.    Rises from chair: with some difficulty   Gait: Mildly antalgic   Appearance: midline incision is well healed   Clinical alignment: neutral   Effusion: mild   Tenderness to palpation: No joint line tenderness  Extension: 3  Flexion: 95 degrees   Collateral ligaments:  intact    Cruciate ligaments: grossly intact     Stable in in the sagittal plane in mid-flexion.    Distally, the circulatory, motor, and sensation exam is intact with 5/5 EHL, gastroc-soleus, and tibialis anterior.  Sensation to light touch is intact.  Dorsalis pedis and posterior tibialis pulses are palpable.  There are no sores on the feet, no bruising, and no lymphedema.    Radiographs of the right knee reviewed and there is a TKA in place in stable position. No signs of loosening.     Assessment: Bill is 6 weeks status post right TKA. He is making progress. He does have some stiffness with flexion. Discussed the importance of improving his ROM and he will continue with work with PT. Provided prescription of tramadol to use to work on his motion. He will follow up in three weeks to recheck his ROM. The patient verbalized agreement and understanding of this plan.      Plan:   - Continue to work on ROM of the right knee   - Continue with therapy with a goal of 110 degrees when the patient returns for follow up   - Short prescription for tramadol to use prior to his ROM exercises  - Follow up in three weeks to check his knee motion.     No ref. provider found

## 2023-09-19 NOTE — PROGRESS NOTES
I have personally examined this patient and have reviewed the clinical presentation and progress note with the resident. I agree with the treatment plan as outlined. The plan was formulated with the resident on the day of the resident's dictation.
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
## 2023-09-20 ENCOUNTER — THERAPY VISIT (OUTPATIENT)
Dept: PHYSICAL THERAPY | Facility: CLINIC | Age: 73
End: 2023-09-20
Payer: MEDICARE

## 2023-09-20 DIAGNOSIS — Z47.1 AFTERCARE FOLLOWING KNEE JOINT REPLACEMENT SURGERY, UNSPECIFIED LATERALITY: Primary | ICD-10-CM

## 2023-09-20 DIAGNOSIS — Z96.651 S/P TOTAL KNEE ARTHROPLASTY, RIGHT: ICD-10-CM

## 2023-09-20 DIAGNOSIS — R60.0 LOCALIZED EDEMA: ICD-10-CM

## 2023-09-20 DIAGNOSIS — R26.9 ABNORMAL GAIT: ICD-10-CM

## 2023-09-20 DIAGNOSIS — Z96.651 S/P TOTAL KNEE ARTHROPLASTY, RIGHT: Primary | ICD-10-CM

## 2023-09-20 DIAGNOSIS — M25.561 ACUTE PAIN OF RIGHT KNEE: ICD-10-CM

## 2023-09-20 DIAGNOSIS — Z96.659 AFTERCARE FOLLOWING KNEE JOINT REPLACEMENT SURGERY, UNSPECIFIED LATERALITY: Primary | ICD-10-CM

## 2023-09-20 PROCEDURE — 97110 THERAPEUTIC EXERCISES: CPT | Mod: GP | Performed by: PHYSICAL THERAPY ASSISTANT

## 2023-09-20 PROCEDURE — 97530 THERAPEUTIC ACTIVITIES: CPT | Mod: GP | Performed by: PHYSICAL THERAPY ASSISTANT

## 2023-09-21 RX ORDER — TRAMADOL HYDROCHLORIDE 50 MG/1
50 TABLET ORAL EVERY 6 HOURS PRN
Qty: 10 TABLET | Refills: 0 | Status: SHIPPED | OUTPATIENT
Start: 2023-09-21 | End: 2023-09-24

## 2023-09-22 ENCOUNTER — THERAPY VISIT (OUTPATIENT)
Dept: PHYSICAL THERAPY | Facility: CLINIC | Age: 73
End: 2023-09-22
Payer: MEDICARE

## 2023-09-22 DIAGNOSIS — Z47.1 AFTERCARE FOLLOWING KNEE JOINT REPLACEMENT SURGERY, UNSPECIFIED LATERALITY: Primary | ICD-10-CM

## 2023-09-22 DIAGNOSIS — M25.561 ACUTE PAIN OF RIGHT KNEE: ICD-10-CM

## 2023-09-22 DIAGNOSIS — R60.0 LOCALIZED EDEMA: ICD-10-CM

## 2023-09-22 DIAGNOSIS — Z96.659 AFTERCARE FOLLOWING KNEE JOINT REPLACEMENT SURGERY, UNSPECIFIED LATERALITY: Primary | ICD-10-CM

## 2023-09-22 DIAGNOSIS — R26.9 ABNORMAL GAIT: ICD-10-CM

## 2023-09-22 PROCEDURE — 97110 THERAPEUTIC EXERCISES: CPT | Mod: GP | Performed by: PHYSICAL THERAPY ASSISTANT

## 2023-09-22 PROCEDURE — 97530 THERAPEUTIC ACTIVITIES: CPT | Mod: GP | Performed by: PHYSICAL THERAPY ASSISTANT

## 2023-09-22 RX ORDER — TRAMADOL HYDROCHLORIDE 50 MG/1
50 TABLET ORAL EVERY 6 HOURS PRN
Qty: 15 TABLET | Refills: 0 | OUTPATIENT
Start: 2023-09-22

## 2023-09-27 ENCOUNTER — THERAPY VISIT (OUTPATIENT)
Dept: PHYSICAL THERAPY | Facility: CLINIC | Age: 73
End: 2023-09-27
Payer: MEDICARE

## 2023-09-27 DIAGNOSIS — M25.561 ACUTE PAIN OF RIGHT KNEE: ICD-10-CM

## 2023-09-27 DIAGNOSIS — R26.9 ABNORMAL GAIT: ICD-10-CM

## 2023-09-27 DIAGNOSIS — Z47.1 AFTERCARE FOLLOWING KNEE JOINT REPLACEMENT SURGERY, UNSPECIFIED LATERALITY: Primary | ICD-10-CM

## 2023-09-27 DIAGNOSIS — Z96.659 AFTERCARE FOLLOWING KNEE JOINT REPLACEMENT SURGERY, UNSPECIFIED LATERALITY: Primary | ICD-10-CM

## 2023-09-27 DIAGNOSIS — R60.0 LOCALIZED EDEMA: ICD-10-CM

## 2023-09-27 PROCEDURE — 97140 MANUAL THERAPY 1/> REGIONS: CPT | Mod: GP | Performed by: PHYSICAL THERAPY ASSISTANT

## 2023-09-27 PROCEDURE — 97110 THERAPEUTIC EXERCISES: CPT | Mod: GP | Performed by: PHYSICAL THERAPY ASSISTANT

## 2023-09-29 ENCOUNTER — THERAPY VISIT (OUTPATIENT)
Dept: PHYSICAL THERAPY | Facility: CLINIC | Age: 73
End: 2023-09-29
Payer: MEDICARE

## 2023-09-29 DIAGNOSIS — M25.561 ACUTE PAIN OF RIGHT KNEE: ICD-10-CM

## 2023-09-29 DIAGNOSIS — R26.9 ABNORMAL GAIT: ICD-10-CM

## 2023-09-29 DIAGNOSIS — Z96.659 AFTERCARE FOLLOWING KNEE JOINT REPLACEMENT SURGERY, UNSPECIFIED LATERALITY: Primary | ICD-10-CM

## 2023-09-29 DIAGNOSIS — R60.0 LOCALIZED EDEMA: ICD-10-CM

## 2023-09-29 DIAGNOSIS — Z47.1 AFTERCARE FOLLOWING KNEE JOINT REPLACEMENT SURGERY, UNSPECIFIED LATERALITY: Primary | ICD-10-CM

## 2023-09-29 PROCEDURE — 97530 THERAPEUTIC ACTIVITIES: CPT | Mod: GP | Performed by: PHYSICAL THERAPY ASSISTANT

## 2023-09-29 PROCEDURE — 97110 THERAPEUTIC EXERCISES: CPT | Mod: GP | Performed by: PHYSICAL THERAPY ASSISTANT

## 2023-09-29 ASSESSMENT — ENCOUNTER SYMPTOMS
FEVER: 0
ABDOMINAL PAIN: 0
COUGH: 0
WEAKNESS: 0
HEMATOCHEZIA: 1
PARESTHESIAS: 0
JOINT SWELLING: 1
CONSTIPATION: 0
SORE THROAT: 0
EYE PAIN: 0
CHILLS: 0
NAUSEA: 0
HEARTBURN: 0
NERVOUS/ANXIOUS: 0
DIZZINESS: 0
DYSURIA: 0
ARTHRALGIAS: 1
PALPITATIONS: 0
MYALGIAS: 0
HEADACHES: 0
DIARRHEA: 0
SHORTNESS OF BREATH: 0
FREQUENCY: 0

## 2023-09-29 ASSESSMENT — ACTIVITIES OF DAILY LIVING (ADL): CURRENT_FUNCTION: NO ASSISTANCE NEEDED

## 2023-10-04 ENCOUNTER — THERAPY VISIT (OUTPATIENT)
Dept: PHYSICAL THERAPY | Facility: CLINIC | Age: 73
End: 2023-10-04
Payer: MEDICARE

## 2023-10-04 DIAGNOSIS — R60.0 LOCALIZED EDEMA: ICD-10-CM

## 2023-10-04 DIAGNOSIS — R26.9 ABNORMAL GAIT: ICD-10-CM

## 2023-10-04 DIAGNOSIS — M25.561 ACUTE PAIN OF RIGHT KNEE: ICD-10-CM

## 2023-10-04 DIAGNOSIS — Z96.659 AFTERCARE FOLLOWING KNEE JOINT REPLACEMENT SURGERY, UNSPECIFIED LATERALITY: Primary | ICD-10-CM

## 2023-10-04 DIAGNOSIS — Z47.1 AFTERCARE FOLLOWING KNEE JOINT REPLACEMENT SURGERY, UNSPECIFIED LATERALITY: Primary | ICD-10-CM

## 2023-10-04 PROCEDURE — 97110 THERAPEUTIC EXERCISES: CPT | Mod: 59 | Performed by: PHYSICAL THERAPY ASSISTANT

## 2023-10-04 PROCEDURE — 97530 THERAPEUTIC ACTIVITIES: CPT | Mod: GP | Performed by: PHYSICAL THERAPY ASSISTANT

## 2023-10-06 ENCOUNTER — THERAPY VISIT (OUTPATIENT)
Dept: PHYSICAL THERAPY | Facility: CLINIC | Age: 73
End: 2023-10-06
Payer: MEDICARE

## 2023-10-06 ENCOUNTER — OFFICE VISIT (OUTPATIENT)
Dept: FAMILY MEDICINE | Facility: CLINIC | Age: 73
End: 2023-10-06
Payer: MEDICARE

## 2023-10-06 VITALS
BODY MASS INDEX: 31.54 KG/M2 | RESPIRATION RATE: 18 BRPM | SYSTOLIC BLOOD PRESSURE: 111 MMHG | HEIGHT: 73 IN | HEART RATE: 78 BPM | OXYGEN SATURATION: 95 % | TEMPERATURE: 97.7 F | DIASTOLIC BLOOD PRESSURE: 71 MMHG | WEIGHT: 238 LBS

## 2023-10-06 DIAGNOSIS — Z47.1 AFTERCARE FOLLOWING KNEE JOINT REPLACEMENT SURGERY, UNSPECIFIED LATERALITY: Primary | ICD-10-CM

## 2023-10-06 DIAGNOSIS — Z12.5 SCREENING FOR PROSTATE CANCER: ICD-10-CM

## 2023-10-06 DIAGNOSIS — Z00.00 ENCOUNTER FOR MEDICARE ANNUAL WELLNESS EXAM: Primary | ICD-10-CM

## 2023-10-06 DIAGNOSIS — M25.561 PAIN IN BOTH KNEES, UNSPECIFIED CHRONICITY: ICD-10-CM

## 2023-10-06 DIAGNOSIS — Z96.659 AFTERCARE FOLLOWING KNEE JOINT REPLACEMENT SURGERY, UNSPECIFIED LATERALITY: Primary | ICD-10-CM

## 2023-10-06 DIAGNOSIS — Z23 ENCOUNTER FOR IMMUNIZATION: ICD-10-CM

## 2023-10-06 DIAGNOSIS — M25.561 ACUTE PAIN OF RIGHT KNEE: ICD-10-CM

## 2023-10-06 DIAGNOSIS — Z13.6 CARDIOVASCULAR SCREENING; LDL GOAL LESS THAN 100: ICD-10-CM

## 2023-10-06 DIAGNOSIS — R60.0 LOCALIZED EDEMA: ICD-10-CM

## 2023-10-06 DIAGNOSIS — M25.562 PAIN IN BOTH KNEES, UNSPECIFIED CHRONICITY: ICD-10-CM

## 2023-10-06 DIAGNOSIS — I10 ESSENTIAL HYPERTENSION WITH GOAL BLOOD PRESSURE LESS THAN 140/90: ICD-10-CM

## 2023-10-06 DIAGNOSIS — R26.9 ABNORMAL GAIT: ICD-10-CM

## 2023-10-06 DIAGNOSIS — E11.9 TYPE 2 DIABETES MELLITUS WITHOUT COMPLICATION, WITHOUT LONG-TERM CURRENT USE OF INSULIN (H): ICD-10-CM

## 2023-10-06 DIAGNOSIS — M1A.00X0 IDIOPATHIC CHRONIC GOUT WITHOUT TOPHUS, UNSPECIFIED SITE: ICD-10-CM

## 2023-10-06 LAB — HBA1C MFR BLD: 6.2 % (ref 0–5.6)

## 2023-10-06 PROCEDURE — 80061 LIPID PANEL: CPT | Performed by: FAMILY MEDICINE

## 2023-10-06 PROCEDURE — 90662 IIV NO PRSV INCREASED AG IM: CPT | Performed by: FAMILY MEDICINE

## 2023-10-06 PROCEDURE — 97110 THERAPEUTIC EXERCISES: CPT | Mod: GP | Performed by: PHYSICAL THERAPY ASSISTANT

## 2023-10-06 PROCEDURE — 99214 OFFICE O/P EST MOD 30 MIN: CPT | Mod: 25 | Performed by: FAMILY MEDICINE

## 2023-10-06 PROCEDURE — 80053 COMPREHEN METABOLIC PANEL: CPT | Performed by: FAMILY MEDICINE

## 2023-10-06 PROCEDURE — 90480 ADMN SARSCOV2 VAC 1/ONLY CMP: CPT | Performed by: FAMILY MEDICINE

## 2023-10-06 PROCEDURE — 97530 THERAPEUTIC ACTIVITIES: CPT | Mod: GP | Performed by: PHYSICAL THERAPY ASSISTANT

## 2023-10-06 PROCEDURE — 91320 SARSCV2 VAC 30MCG TRS-SUC IM: CPT | Performed by: FAMILY MEDICINE

## 2023-10-06 PROCEDURE — G0439 PPPS, SUBSEQ VISIT: HCPCS | Performed by: FAMILY MEDICINE

## 2023-10-06 PROCEDURE — G0008 ADMIN INFLUENZA VIRUS VAC: HCPCS | Performed by: FAMILY MEDICINE

## 2023-10-06 PROCEDURE — G0103 PSA SCREENING: HCPCS | Performed by: FAMILY MEDICINE

## 2023-10-06 PROCEDURE — 83036 HEMOGLOBIN GLYCOSYLATED A1C: CPT | Performed by: FAMILY MEDICINE

## 2023-10-06 PROCEDURE — 36415 COLL VENOUS BLD VENIPUNCTURE: CPT | Performed by: FAMILY MEDICINE

## 2023-10-06 RX ORDER — ALLOPURINOL 100 MG/1
TABLET ORAL
Qty: 180 TABLET | Refills: 3 | Status: SHIPPED | OUTPATIENT
Start: 2023-10-06

## 2023-10-06 RX ORDER — HYDRALAZINE HYDROCHLORIDE 50 MG/1
50 TABLET, FILM COATED ORAL 2 TIMES DAILY
Qty: 180 TABLET | Refills: 3 | Status: SHIPPED | OUTPATIENT
Start: 2023-10-06 | End: 2024-09-10

## 2023-10-06 RX ORDER — LOSARTAN POTASSIUM 100 MG/1
TABLET ORAL
Qty: 90 TABLET | Refills: 3 | Status: SHIPPED | OUTPATIENT
Start: 2023-10-06

## 2023-10-06 RX ORDER — CHLORTHALIDONE 25 MG/1
25 TABLET ORAL EVERY MORNING
Qty: 90 TABLET | Refills: 3 | Status: SHIPPED | OUTPATIENT
Start: 2023-10-06

## 2023-10-06 RX ORDER — CARVEDILOL 25 MG/1
TABLET ORAL
Qty: 180 TABLET | Refills: 3 | Status: SHIPPED | OUTPATIENT
Start: 2023-10-06

## 2023-10-06 RX ORDER — INDOMETHACIN 50 MG/1
50 CAPSULE ORAL 3 TIMES DAILY PRN
Qty: 60 CAPSULE | Refills: 3 | Status: SHIPPED | OUTPATIENT
Start: 2023-10-06

## 2023-10-06 ASSESSMENT — ACTIVITIES OF DAILY LIVING (ADL)
SQUAT: ACTIVITY IS SOMEWHAT DIFFICULT
CURRENT_FUNCTION: NO ASSISTANCE NEEDED
HOW_WOULD_YOU_RATE_THE_CURRENT_FUNCTION_OF_YOUR_KNEE_DURING_YOUR_USUAL_DAILY_ACTIVITIES_ON_A_SCALE_FROM_0_TO_100_WITH_100_BEING_YOUR_LEVEL_OF_KNEE_FUNCTION_PRIOR_TO_YOUR_INJURY_AND_0_BEING_THE_INABILITY_TO_PERFORM_ANY_OF_YOUR_USUAL_DAILY_ACTIVITIES?: 75
RISE FROM A CHAIR: ACTIVITY IS MINIMALLY DIFFICULT
SIT WITH YOUR KNEE BENT: ACTIVITY IS MINIMALLY DIFFICULT
PAIN: THE SYMPTOM AFFECTS MY ACTIVITY MODERATELY
WALK: ACTIVITY IS MINIMALLY DIFFICULT
WEAKNESS: THE SYMPTOM AFFECTS MY ACTIVITY SLIGHTLY
KNEEL ON THE FRONT OF YOUR KNEE: ACTIVITY IS SOMEWHAT DIFFICULT
KNEE_ACTIVITY_OF_DAILY_LIVING_SUM: 45
STIFFNESS: THE SYMPTOM AFFECTS MY ACTIVITY MODERATELY
KNEE_ACTIVITY_OF_DAILY_LIVING_SCORE: 64.29
SWELLING: THE SYMPTOM AFFECTS MY ACTIVITY MODERATELY
STAND: ACTIVITY IS MINIMALLY DIFFICULT
HOW_WOULD_YOU_RATE_THE_OVERALL_FUNCTION_OF_YOUR_KNEE_DURING_YOUR_USUAL_DAILY_ACTIVITIES?: NEARLY NORMAL
LIMPING: THE SYMPTOM AFFECTS MY ACTIVITY SLIGHTLY
AS_A_RESULT_OF_YOUR_KNEE_INJURY,_HOW_WOULD_YOU_RATE_YOUR_CURRENT_LEVEL_OF_DAILY_ACTIVITY?: NEARLY NORMAL
RAW_SCORE: 45
GIVING WAY, BUCKLING OR SHIFTING OF KNEE: THE SYMPTOM AFFECTS MY ACTIVITY SLIGHTLY
GO DOWN STAIRS: ACTIVITY IS MINIMALLY DIFFICULT
GO UP STAIRS: ACTIVITY IS MINIMALLY DIFFICULT

## 2023-10-06 ASSESSMENT — ENCOUNTER SYMPTOMS
DIZZINESS: 0
CHILLS: 0
PALPITATIONS: 0
FREQUENCY: 0
EYE PAIN: 0
HEADACHES: 0
NERVOUS/ANXIOUS: 0
COUGH: 0
CONSTIPATION: 0
MYALGIAS: 0
SORE THROAT: 0
NAUSEA: 0
JOINT SWELLING: 1
SHORTNESS OF BREATH: 0
HEMATOCHEZIA: 1
DIARRHEA: 0
DYSURIA: 0
FEVER: 0
HEARTBURN: 0
ARTHRALGIAS: 1
PARESTHESIAS: 0
WEAKNESS: 0
ABDOMINAL PAIN: 0

## 2023-10-06 ASSESSMENT — PAIN SCALES - GENERAL: PAINLEVEL: SEVERE PAIN (7)

## 2023-10-06 NOTE — PROGRESS NOTES
"SUBJECTIVE:   Bill is a 72 year old who presents for Preventive Visit.      10/6/2023     3:09 PM   Additional Questions   Roomed by victoria   Accompanied by wife       Are you in the first 12 months of your Medicare coverage?  No    Healthy Habits:     In general, how would you rate your overall health?  Good    Frequency of exercise:  6-7 days/week    Duration of exercise:  15-30 minutes    Do you usually eat at least 4 servings of fruit and vegetables a day, include whole grains    & fiber and avoid regularly eating high fat or \"junk\" foods?  No    Taking medications regularly:  Yes    Barriers to taking medications:  Problems remembering to take them    Medication side effects:  None    Ability to successfully perform activities of daily living:  No assistance needed    Home Safety:  No safety concerns identified    Hearing Impairment:  No hearing concerns    In the past 6 months, have you been bothered by leaking of urine? Yes    In general, how would you rate your overall mental or emotional health?  Good    Additional concerns today:  No      Today's PHQ-2 Score:       10/5/2023     5:39 PM   PHQ-2 ( 1999 [employer])   Q1: Little interest or pleasure in doing things 0   Q2: Feeling down, depressed or hopeless 0   PHQ-2 Score 0   Q1: Little interest or pleasure in doing things Not at all   Q2: Feeling down, depressed or hopeless Not at all   PHQ-2 Score 0           Have you ever done Advance Care Planning? (For example, a Health Directive, POLST, or a discussion with a medical provider or your loved ones about your wishes): Yes, patient states has an Advance Care Planning document and will bring a copy to the clinic.    Fall risk  Fallen 2 or more times in the past year?: Yes  Any fall with injury in the past year?: Yes  click delete button to remove this line now  Cognitive Screening   1) Repeat 3 items (Leader, Season, Table)    2) Clock draw: NORMAL  3) 3 item recall: Recalls 3 objects  Results: 3 items " recalled: COGNITIVE IMPAIRMENT LESS LIKELY    Mini-CogTM Copyright MARÍA Raza. Licensed by the author for use in [facility]; reprinted with permission ([email or web address]). All rights reserved.      Do you have sleep apnea, excessive snoring or daytime drowsiness? : yes    Reviewed and updated as needed this visit by clinical staff   Tobacco  Allergies               Reviewed and updated as needed this visit by Provider                 Social History     Tobacco Use     Smoking status: Never     Passive exposure: Never     Smokeless tobacco: Never   Substance Use Topics     Alcohol use: Not Currently     Comment: rarely             9/29/2023     9:10 AM   Alcohol Use   Prescreen: >3 drinks/day or >7 drinks/week? No          No data to display              Do you have a current opioid prescription? No  Do you use any other controlled substances or medications that are not prescribed by a provider? None        Current providers sharing in care for this patient include:   Patient Care Team:  Nirmal Lubin MD as PCP - General (Family Practice)  Nirmal Lubin MD as Assigned PCP  Guillermo Rivera MD as Assigned Neuroscience Provider  Miky Jarvis MD as Assigned Musculoskeletal Provider  Nirmal Lubin MD as Assigned Pain Medication Provider    The following health maintenance items are reviewed in Epic and correct as of today:  Health Maintenance   Topic Date Due     INFLUENZA VACCINE (1) 09/01/2023     COVID-19 Vaccine (5 - 2023-24 season) 09/01/2023     DIABETIC FOOT EXAM  10/04/2023     ANNUAL REVIEW OF HM ORDERS  10/04/2023     MEDICARE ANNUAL WELLNESS VISIT  10/04/2023     LIPID  10/06/2023     MICROALBUMIN  10/06/2023     A1C  11/08/2023     EYE EXAM  11/16/2023     BMP  08/08/2024     FALL RISK ASSESSMENT  10/06/2024     COLORECTAL CANCER SCREENING  10/12/2025     ADVANCE CARE PLANNING  10/04/2027     DTAP/TDAP/TD IMMUNIZATION (3 - Td or Tdap) 08/08/2033     HEPATITIS C SCREENING  Completed      PHQ-2 (once per calendar year)  Completed     Pneumococcal Vaccine: 65+ Years  Completed     ZOSTER IMMUNIZATION  Completed     AORTIC ANEURYSM SCREENING (SYSTEM ASSIGNED)  Completed     IPV IMMUNIZATION  Aged Out     HPV IMMUNIZATION  Aged Out     MENINGITIS IMMUNIZATION  Aged Out     Lab work is in process  Labs reviewed in EPIC  BP Readings from Last 3 Encounters:   10/06/23 111/71   08/12/23 131/70   08/08/23 135/80    Wt Readings from Last 3 Encounters:   10/06/23 108 kg (238 lb)   08/11/23 110.1 kg (242 lb 11.2 oz)   08/08/23 109.5 kg (241 lb 6.4 oz)                  Patient Active Problem List   Diagnosis     History of hernia repair     Parathyroid adenoma     Gout     CARDIOVASCULAR SCREENING; LDL GOAL LESS THAN 130     Insomnia     Anxiety     Advanced directives, counseling/discussion     Essential hypertension with goal blood pressure less than 140/90     Idiopathic chronic gout without tophus, unspecified site     Prediabetes     Complex tear of medial meniscus of right knee as current injury, subsequent encounter     Chondromalacia of right knee     Diabetes mellitus, type 2 (H)     Status post total right knee replacement     Aftercare following knee joint replacement surgery, unspecified laterality     Localized edema     Abnormal gait     Acute pain of right knee     Past Surgical History:   Procedure Laterality Date     ABDOMEN SURGERY      Hernia repair left and right side     ARTHROPLASTY KNEE Right 8/11/2023    Procedure: ARTHROPLASTY, KNEE, TOTAL RIGHT;  Surgeon: Miky Jarvis MD;  Location: [facility] Main OR     ARTHROSCOPY KNEE WITH MEDIAL MENISCECTOMY Right 06/30/2021    Procedure: ARTHROSCOPY, RIGHT KNEE, WITH MEDIAL MENISCECTOMY, MAJOR CHONDROPLASTY.;  Surgeon: Jens Riley MD;  Location: [facility] OR     COLONOSCOPY       HERNIA REPAIR       LAMINECT/DISCECTOMY, CERVICAL  01/01/1999     PARATHYROIDECTOMY  2007    partial     ZZC APPENDECTOMY         Social History     Tobacco Use      Smoking status: Never     Passive exposure: Never     Smokeless tobacco: Never   Substance Use Topics     Alcohol use: Not Currently     Comment: rarely     Family History   Problem Relation Age of Onset     Prostate Cancer Father      Alzheimer Disease Paternal Grandmother      Hypertension Maternal Uncle         AGE 60'S         Current Outpatient Medications   Medication Sig Dispense Refill     acetaminophen (TYLENOL) 325 MG tablet Take 3 tablets (975 mg) by mouth every 8 hours 100 tablet 0     allopurinol (ZYLOPRIM) 100 MG tablet TAKE 1 TABLET BY MOUTH TWICE DAILY FOR  GOUT 180 tablet 3     ascorbic acid (VITAMIN C) 1000 MG TABS Take 1,000 mg by mouth daily       bevacizumab (AVASTIN) 25 mg/mL intravitreal inj by Intravitreal route once Every 7 weeks       carvedilol (COREG) 25 MG tablet TAKE 1 TABLET BY MOUTH TWICE DAILY WITH MEALS FOR BLOOD PRESSURE 180 tablet 3     chlorthalidone (HYGROTON) 25 MG tablet Take 1 tablet (25 mg) by mouth every morning For blood pressure. 90 tablet 3     fluticasone (FLONASE) 50 MCG/ACT nasal spray Spray 1 spray into both nostrils daily as needed for rhinitis or allergies       hydrALAZINE (APRESOLINE) 50 MG tablet Take 1 tablet (50 mg) by mouth 2 times daily 180 tablet 3     indomethacin (INDOCIN) 50 MG capsule Take 1 capsule (50 mg) by mouth 3 times daily as needed for moderate pain 60 capsule 3     losartan (COZAAR) 100 MG tablet Take 1 tablet by mouth once daily for blood pressure 90 tablet 3     metFORMIN (GLUCOPHAGE) 500 MG tablet TAKE 1 TABLET BY MOUTH TWICE DAILY WITH MEALS FOR DIABETES 180 tablet 3     MULTIPLE VITAMIN PO Take 1 tablet by mouth daily Stopping 8/4/23 before surgery       ondansetron (ZOFRAN ODT) 4 MG ODT tab Take 1 tablet (4 mg) by mouth every 6 hours as needed for nausea or vomiting 8 tablet 0     polyethylene glycol (MIRALAX) 17 g packet Take 17 g by mouth daily 7 packet 0     primidone (MYSOLINE) 50 MG tablet Take 50 mg in the morning and 100 mg in  "the evening. 270 tablet 1     senna-docusate (SENOKOT-S/PERICOLACE) 8.6-50 MG tablet Take 1 tablet by mouth 2 times daily 20 tablet 0     traMADol (ULTRAM) 50 MG tablet Take 1 tablet (50 mg) by mouth every 6 hours as needed for severe pain 15 tablet 0     Allergies   Allergen Reactions     [place] Grass Extract Other (See Comments)     Coughing sneezing     Cats      Grass Pollen(K-O-R-T-Swt Sourav) Cough     Lisinopril Cough     Molds & Smuts Other (See Comments)     Coughing sneezing  Coughing sneezing       Shellfish Allergy Other (See Comments)     Causes gout           Review of Systems   Constitutional:  Negative for chills and fever.   HENT:  Negative for congestion, ear pain, hearing loss and sore throat.    Eyes:  Positive for visual disturbance. Negative for pain.   Respiratory:  Negative for cough and shortness of breath.    Cardiovascular:  Positive for peripheral edema. Negative for chest pain and palpitations.   Gastrointestinal:  Positive for hematochezia. Negative for abdominal pain, constipation, diarrhea, heartburn and nausea.   Genitourinary:  Positive for impotence. Negative for dysuria, frequency, genital sores and urgency.   Musculoskeletal:  Positive for arthralgias and joint swelling. Negative for myalgias.   Skin:  Negative for rash.   Neurological:  Negative for dizziness, weakness, headaches and paresthesias.   Psychiatric/Behavioral:  Negative for mood changes. The patient is not nervous/anxious.      Constitutional, HEENT, cardiovascular, pulmonary, GI, , musculoskeletal, neuro, skin, endocrine and psych systems are negative, except as otherwise noted.    OBJECTIVE:   /68 (BP Location: Left arm, Patient Position: Chair, Cuff Size: Adult Regular)   Pulse 78   Temp 97.7  F (36.5  C) (Oral)   Resp 18   Ht 1.861 m (6' 1.25\")   Wt 108 kg (238 lb)   SpO2 95%   BMI 31.19 kg/m   Estimated body mass index is 31.19 kg/m  as calculated from the following:    Height as of this " "encounter: 1.861 m (6' 1.25\").    Weight as of this encounter: 108 kg (238 lb).  Physical Exam  GENERAL: healthy, alert and no distress  NECK: no adenopathy, no asymmetry, masses, or scars and thyroid normal to palpation  RESP: lungs clear to auscultation - no rales, rhonchi or wheezes  CV: regular rate and rhythm, normal S1 S2, no S3 or S4, no murmur, click or rub, no peripheral edema and peripheral pulses strong  ABDOMEN: soft, nontender, no hepatosplenomegaly, no masses and bowel sounds normal  MS: no gross musculoskeletal defects noted, no edema      ASSESSMENT / PLAN:   (Z00.00) Encounter for Medicare annual wellness exam  (primary encounter diagnosis)  Comment:   Plan: as below.    (E11.9) Type 2 diabetes mellitus without complication, without long-term current use of insulin (H)  Comment:   Plan: Albumin Random Urine Quantitative with Creat         Ratio, Hemoglobin A1c, Comprehensive metabolic         panel (BMP + Alb, Alk Phos, ALT, AST, Total.         Bili, TP), metFORMIN (GLUCOPHAGE) 500 MG tablet        Recheck A1c and adjust if needed.    (I10) Essential hypertension with goal blood pressure less than 140/90  Comment:   Plan: Albumin Random Urine Quantitative with Creat         Ratio, Comprehensive metabolic panel (BMP +         Alb, Alk Phos, ALT, AST, Total. Bili, TP),         carvedilol (COREG) 25 MG tablet, chlorthalidone        (HYGROTON) 25 MG tablet, hydrALAZINE         (APRESOLINE) 50 MG tablet, losartan (COZAAR)         100 MG tablet        Controlled.    (Z13.6) CARDIOVASCULAR SCREENING; LDL GOAL LESS THAN 100  Comment:   Plan: Lipid panel reflex to direct LDL Fasting,         Comprehensive metabolic panel (BMP + Alb, Alk         Phos, ALT, AST, Total. Bili, TP)            (Z12.5) Screening for prostate cancer  Comment:   Plan: PSA, screen            (Z23) Encounter for immunization  Comment:   Plan: INFLUENZA VACCINE 65+ (FLUZONE HD), COVID-19         12+ (2023-24) (PFIZER)        " "    (M1A.00X0) Idiopathic chronic gout without tophus, unspecified site  Comment:   Plan: allopurinol (ZYLOPRIM) 100 MG tablet            (M25.561,  M25.562) Pain in both knees, unspecified chronicity  Comment:   Plan: indomethacin (INDOCIN) 50 MG capsule            Patient has been advised of split billing requirements and indicates understanding: Yes      COUNSELING:  Reviewed preventive health counseling, as reflected in patient instructions       Regular exercise       Healthy diet/nutrition       Vision screening      BMI:   Estimated body mass index is 31.19 kg/m  as calculated from the following:    Height as of this encounter: 1.861 m (6' 1.25\").    Weight as of this encounter: 108 kg (238 lb).         He reports that he has never smoked. He has never been exposed to tobacco smoke. He has never used smokeless tobacco.      Appropriate preventive services were discussed with this patient, including applicable screening as appropriate for fall prevention, nutrition, physical activity, Tobacco-use cessation, weight loss and cognition.  Checklist reviewing preventive services available has been given to the patient.    Reviewed patients plan of care and provided an AVS. The Basic Care Plan (routine screening as documented in Health Maintenance) for Bill meets the Care Plan requirement. This Care Plan has been established and reviewed with the Patient.          Nirmal Lubin MD, MD [facility]    Identified Health Risks:  I have reviewed Opioid Use Disorder and Substance Use Disorder risk factors and made any needed referrals.   "

## 2023-10-06 NOTE — PROGRESS NOTES
"   10/06/23 0500   Appointment Info   Signing clinician's name / credentials Meggan Hernandez, TERESA/ Juanito Bell SPT   Total/Authorized Visits 16   Visits Used 10   Medical Diagnosis s/p R tka   PT Tx Diagnosis s/p R TKA   Other pertinent information \"Alex\", wife Brooke Hernandez Adds Certification;Student Supervision;PTA Supervision   Progress Note/Certification   Start of Care Date 08/18/23   Onset of illness/injury or Date of Surgery 08/11/23   Therapy Frequency 1x/week   Predicted Duration 6 weeks   Certification date from 08/18/23   Certification date to 11/20/23   Progress Note Due Date 10/10/23   Progress Note Completed Date 10/06/23   Supervision   Student Supervision Direct Patient Contact Provided   Assistant Supervision PTA visit observed, intervention appropriate, plan of care reviewed and modified   PT Assistant Visit Number 1   GOALS   PT Goals 4;3;2   PT Goal 1   Goal Identifier walking   Goal Description Pt will be able to walk with a single point cane for 100 feet   Rationale to maximize safety and independence within the home;to maximize safety and independence within the community;to maximize safety and independence with transportation;to maximize safety and independence with self cares;to maximize safety and independence with performance of ADLs and functional tasks   Goal Progress walked 100 feet in clinic with a SPC   Target Date 09/18/23   Date Met 09/05/23   PT Goal 2   Goal Identifier walking   Goal Description Pt will be able to walk without an AD for a couple blocks   Rationale to maximize safety and independence within the community;to maximize safety and independence with transportation;to maximize safety and independence with self cares;to maximize safety and independence with performance of ADLs and functional tasks;to maximize safety and independence within the home   Goal Progress walked one mile yesterday with SEC (very light support needed with AD), progressing   Target Date 11/20/23 " "  PT Goal 3   Goal Identifier Stairs   Goal Description pt will demonstrate eccentric control on a 4\" stepdown x10   Rationale to maximize safety and independence with performance of ADLs and functional tasks;to maximize safety and independence within the home;to maximize safety and independence within the community   Goal Progress fatigued quickly with 3\" step downs x15   Target Date 10/18/23   PT Goal 4   Goal Identifier Stairs   Goal Description pt will demonstrate eccentric control on a 6\" step up x10   Rationale to maximize safety and independence with performance of ADLs and functional tasks;to maximize safety and independence within the home;to maximize safety and independence within the community;to maximize safety and independence with self cares   Target Date 11/20/23   Subjective Report   Subjective Report Pts knee felt sore and fatigued after last session from pushing the strength harder, but it is feeling better today. 4/10 PL, feeling a little more stiff today   Objective Measures   Objective Measures Objective Measure 1;Objective Measure 2;Objective Measure 3;Objective Measure 4;Objective Measure 5   Objective Measure 1   Objective Measure ROMh   Details AAROM wallslide knee flex 112; AROM: 0-3-112 deg; PROM: 0-0-114 deg   Objective Measure 2   Objective Measure gait   Details shortened stance time on R, decreased R pushoff   Objective Measure 3   Objective Measure sit to stand   Details UE assist needed, slight favoring of L leg   Objective Measure 4   Objective Measure MMT R knee   Details quad 4/5, hamstring 4/5   Objective Measure 5   Objective Measure Knee ADLS   Details has improved from 30 to 64%   Treatment Interventions (PT)   Interventions Manual Therapy;Therapeutic Procedure/Exercise;Therapeutic Activity   Therapeutic Procedure/Exercise   Therapeutic Procedures: strength, endurance, ROM, flexibillity minutes (57361) 23   Therapeutic Procedures Ther Proc 2;Ther Proc 3;Ther Proc 4;Ther Proc " "5;Ther Proc 6   Ther Proc 1 Bike SH 7   Ther Proc 1 - Details x5 min 6 resistance   Ther Proc 2 wall slides   Ther Proc 2 - Details x2 min   Ther Proc 3 leg press 45 deg   Ther Proc 3 - Details 110# 2x15, 88# x30   Ther Proc 4 heel slides   Ther Proc 4 - Details 10x   Ther Proc 5 passive knee ext heel prop   Ther Proc 5 - Details 1 min   Skilled Intervention strength and ROM progressions as tolerable   Patient Response/Progress strength is slowly improving   Therapeutic Activity   Therapeutic Activities: dynamic activities to improve functional performance minutes (44195) 15   Therapeutic Activities Ther Act 2;Ther Act 3;Ther Act 4;Ther Act 5   Ther Act 1 sit to stand   Ther Act 1 - Details x10   Ther Act 2 ambulation   Ther Act 2 - Details cues for increased stride length and increased R push off   Ther Act 3 3\" lateral step x 15, 3\" step fwd x 5 difficulty present   Ther Act 4 high knees over cones   Ther Act 4 - Details 6x20'   Skilled Intervention cuing gait pattern   Patient Response/Progress improved gait with cues, UE support needed for sit to stand   Education   Learner/Method Patient;Significant Other;No Barriers to Learning   Plan   Home program see Ptrx   Updates to plan of care moving to 1 session per week   Plan for next session continue progressing strength and assess gait   Comments   Comments PN written in collaboration with Zachary Duvall, PT   Total Session Time   Timed Code Treatment Minutes 38   Total Treatment Time (sum of timed and untimed services) 38         [facility]                                                                                   OUTPATIENT PHYSICAL THERAPY    PLAN OF TREATMENT FOR OUTPATIENT REHABILITATION   Patient's Last Name, First Name, Bill Perez YOB: 1950   Provider's Name   [facility]   Medical Record No.  [id number]     Onset Date: 08/11/23  Start of Care Date: 08/18/23   "   Medical Diagnosis:  s/p R tka      PT Treatment Diagnosis:  s/p R TKA Plan of Treatment  Frequency/Duration: (P) 1x/week/ (P) 6 weeks    Certification date from 08/18/23 to 11/20/23         See note for plan of treatment details and functional goals     Zachary Duvall PT, OCS                         I CERTIFY THE NEED FOR THESE SERVICES FURNISHED UNDER        THIS PLAN OF TREATMENT AND WHILE UNDER MY CARE     (Physician attestation of this document indicates review and certification of the therapy plan).                Referring Provider:  Miky Jarvis      Initial Assessment  See Epic Evaluation- Start of Care Date: 08/18/23            PLAN  Continue therapy per current plan of care.  1x/week for 6 weeks    Beginning/End Dates of Progress Note Reporting Period:  8/18/23 to 10/06/2023    Referring Provider:  Miky Jarvis

## 2023-10-07 LAB
ALBUMIN SERPL BCG-MCNC: 4.4 G/DL (ref 3.5–5.2)
ALP SERPL-CCNC: 102 U/L (ref 40–129)
ALT SERPL W P-5'-P-CCNC: 15 U/L (ref 0–70)
ANION GAP SERPL CALCULATED.3IONS-SCNC: 15 MMOL/L (ref 7–15)
AST SERPL W P-5'-P-CCNC: 22 U/L (ref 0–45)
BILIRUB SERPL-MCNC: 0.5 MG/DL
BUN SERPL-MCNC: 10.6 MG/DL (ref 8–23)
CALCIUM SERPL-MCNC: 9.6 MG/DL (ref 8.8–10.2)
CHLORIDE SERPL-SCNC: 104 MMOL/L (ref 98–107)
CHOLEST SERPL-MCNC: 137 MG/DL
CREAT SERPL-MCNC: 0.83 MG/DL (ref 0.67–1.17)
CREAT UR-MCNC: 139 MG/DL
DEPRECATED HCO3 PLAS-SCNC: 25 MMOL/L (ref 22–29)
EGFRCR SERPLBLD CKD-EPI 2021: >90 ML/MIN/1.73M2
GLUCOSE SERPL-MCNC: 131 MG/DL (ref 70–99)
HDLC SERPL-MCNC: 36 MG/DL
LDLC SERPL CALC-MCNC: 63 MG/DL
MICROALBUMIN UR-MCNC: 57.2 MG/L
MICROALBUMIN/CREAT UR: 41.15 MG/G CR (ref 0–17)
NONHDLC SERPL-MCNC: 101 MG/DL
POTASSIUM SERPL-SCNC: 3.7 MMOL/L (ref 3.4–5.3)
PROT SERPL-MCNC: 7.2 G/DL (ref 6.4–8.3)
PSA SERPL DL<=0.01 NG/ML-MCNC: 2.04 NG/ML (ref 0–6.5)
SODIUM SERPL-SCNC: 144 MMOL/L (ref 135–145)
TRIGL SERPL-MCNC: 192 MG/DL

## 2023-10-07 PROCEDURE — 82570 ASSAY OF URINE CREATININE: CPT | Performed by: FAMILY MEDICINE

## 2023-10-07 PROCEDURE — 82043 UR ALBUMIN QUANTITATIVE: CPT | Performed by: FAMILY MEDICINE

## 2023-10-10 ENCOUNTER — OFFICE VISIT (OUTPATIENT)
Dept: ORTHOPEDICS | Facility: CLINIC | Age: 73
End: 2023-10-10
Payer: MEDICARE

## 2023-10-10 DIAGNOSIS — Z96.659 STATUS POST TOTAL KNEE REPLACEMENT, UNSPECIFIED LATERALITY: Primary | ICD-10-CM

## 2023-10-10 PROCEDURE — 99024 POSTOP FOLLOW-UP VISIT: CPT | Performed by: ORTHOPAEDIC SURGERY

## 2023-10-10 ASSESSMENT — PAIN SCALES - GENERAL: PAINLEVEL: MODERATE PAIN (4)

## 2023-10-10 NOTE — NURSING NOTE
Bill Smith's chief complaint for this visit includes:  Chief Complaint   Patient presents with    Right Knee - Follow Up     Right knee ROM followup       Referring Provider:  No referring provider defined for this encounter.    There were no vitals taken for this visit.  Moderate Pain (4)   Global Mental Health Score:    Global Physical Health Score:    PROMIS TOTAL - SUBSCORES:    [facility]: 2-3 KOOSJR: 52.47      Pain increases with: Deep flexion still.   Previous surgeries: RTKA 8/11/23  Previous injections within last 6 months: NO  Treatments done: PT- going well.   Imaging completed: XR today  Pain: 0/10  Concerns: Walking has increased and stationary bike has increased. Some days muscle soreness after leg press at PT.      Divine Landeros, ATC

## 2023-10-10 NOTE — LETTER
10/10/2023         RE: Bill Smith  [address]   [place] [address]        Dear Colleague,    Thank you for referring your patient, Bill Smith, to the [facility]. Please see a copy of my visit note below.    Chief Complaint: Follow Up of the Right Knee (Right knee ROM followup)         HPI: Bill Smith returns today in follow-up for right TKA performed 8 weeks ago. He has been working on ROM with therapy. He states that he made progress and reached ROM of 3-112. Has been using Tylenol and Ibuprofen for pain control. He is otherwise doing well and ambulating using a cane. He goes to 1 mile walks in the morning with his dog.       Medications and allergies are documented in the EMR and have been reviewed.      Current Outpatient Medications:      acetaminophen (TYLENOL) 325 MG tablet, Take 3 tablets (975 mg) by mouth every 8 hours, Disp: 100 tablet, Rfl: 0     allopurinol (ZYLOPRIM) 100 MG tablet, TAKE 1 TABLET BY MOUTH TWICE DAILY FOR  GOUT, Disp: 180 tablet, Rfl: 3     ascorbic acid (VITAMIN C) 1000 MG TABS, Take 1,000 mg by mouth daily, Disp: , Rfl:      bevacizumab (AVASTIN) 25 mg/mL intravitreal inj, by Intravitreal route once Every 7 weeks, Disp: , Rfl:      carvedilol (COREG) 25 MG tablet, TAKE 1 TABLET BY MOUTH TWICE DAILY WITH MEALS FOR BLOOD PRESSURE, Disp: 180 tablet, Rfl: 3     chlorthalidone (HYGROTON) 25 MG tablet, Take 1 tablet (25 mg) by mouth every morning For blood pressure., Disp: 90 tablet, Rfl: 3     fluticasone (FLONASE) 50 MCG/ACT nasal spray, Spray 1 spray into both nostrils daily as needed for rhinitis or allergies, Disp: , Rfl:      hydrALAZINE (APRESOLINE) 50 MG tablet, Take 1 tablet (50 mg) by mouth 2 times daily, Disp: 180 tablet, Rfl: 3     indomethacin (INDOCIN) 50 MG capsule, Take 1 capsule (50 mg) by mouth 3 times daily as needed for moderate pain, Disp: 60 capsule, Rfl: 3     losartan (COZAAR) 100 MG tablet, Take 1 tablet by mouth  once daily for blood pressure, Disp: 90 tablet, Rfl: 3     metFORMIN (GLUCOPHAGE) 500 MG tablet, TAKE 1 TABLET BY MOUTH TWICE DAILY WITH MEALS FOR DIABETES, Disp: 180 tablet, Rfl: 3     MULTIPLE VITAMIN PO, Take 1 tablet by mouth daily Stopping 8/4/23 before surgery, Disp: , Rfl:      ondansetron (ZOFRAN ODT) 4 MG ODT tab, Take 1 tablet (4 mg) by mouth every 6 hours as needed for nausea or vomiting, Disp: 8 tablet, Rfl: 0     polyethylene glycol (MIRALAX) 17 g packet, Take 17 g by mouth daily, Disp: 7 packet, Rfl: 0     primidone (MYSOLINE) 50 MG tablet, Take 50 mg in the morning and 100 mg in the evening., Disp: 270 tablet, Rfl: 1     senna-docusate (SENOKOT-S/PERICOLACE) 8.6-50 MG tablet, Take 1 tablet by mouth 2 times daily, Disp: 20 tablet, Rfl: 0     traMADol (ULTRAM) 50 MG tablet, Take 1 tablet (50 mg) by mouth every 6 hours as needed for severe pain, Disp: 15 tablet, Rfl: 0    Allergies: Bermuda grass extract, Cats, Grass pollen(k-o-r-t-swt gisel), Lisinopril, Molds & smuts, and Shellfish allergy    Current Status:  VANESSA Jr: 52.47  UCLA: 2-3      Physical Exam:  On physical examination the patient appears the stated age, is in no acute distress, affect is appropriate, and breathing is non-labored.    There were no vitals taken for this visit.  There is no height or weight on file to calculate BMI.    Rises from chair: with some difficulty  Gait: mildly antalgic  Appearance: benign, incision well healed  Clinical alignment: neutral  Effusion: mild  Tenderness to palpation: No  Extension: 3  Flexion:  110  Collateral ligaments: intact    Cruciate ligaments: grossly intact     Stable in in the sagittal plane in mid-flexion.    Distally, the circulatory, motor, and sensation exam is intact with 5/5 EHL, gastroc-soleus, and tibialis anterior.  Sensation to light touch is intact.  Dorsalis pedis and posterior tibialis pulses are palpable.  There are no sores on the feet, no bruising, and no  lymphedema.    Assessment: Bill is 8 weeks s/p right LAURA. Doing well and progressing with therapy appropriately.     Plan:   - Continue to work with PT  - Continue working on ROM  - RTC at 12 weeks postop.    No ref. provider found      I have personally examined this patient and have reviewed the clinical presentation and progress note with the resident. I agree with the treatment plan as outlined. The plan was formulated with the resident on the day of the resident's dictation.      Again, thank you for allowing me to participate in the care of your patient.        Sincerely,        Miky Jarvis MD

## 2023-10-10 NOTE — PROGRESS NOTES
Chief Complaint: Follow Up of the Right Knee (Right knee ROM followup)         HPI: Bill Smith returns today in follow-up for right TKA performed 8 weeks ago. He has been working on ROM with therapy. He states that he made progress and reached ROM of 3-112. Has been using Tylenol and Ibuprofen for pain control. He is otherwise doing well and ambulating using a cane. He goes to 1 mile walks in the morning with his dog.       Medications and allergies are documented in the EMR and have been reviewed.      Current Outpatient Medications:     acetaminophen (TYLENOL) 325 MG tablet, Take 3 tablets (975 mg) by mouth every 8 hours, Disp: 100 tablet, Rfl: 0    allopurinol (ZYLOPRIM) 100 MG tablet, TAKE 1 TABLET BY MOUTH TWICE DAILY FOR  GOUT, Disp: 180 tablet, Rfl: 3    ascorbic acid (VITAMIN C) 1000 MG TABS, Take 1,000 mg by mouth daily, Disp: , Rfl:     bevacizumab (AVASTIN) 25 mg/mL intravitreal inj, by Intravitreal route once Every 7 weeks, Disp: , Rfl:     carvedilol (COREG) 25 MG tablet, TAKE 1 TABLET BY MOUTH TWICE DAILY WITH MEALS FOR BLOOD PRESSURE, Disp: 180 tablet, Rfl: 3    chlorthalidone (HYGROTON) 25 MG tablet, Take 1 tablet (25 mg) by mouth every morning For blood pressure., Disp: 90 tablet, Rfl: 3    fluticasone (FLONASE) 50 MCG/ACT nasal spray, Spray 1 spray into both nostrils daily as needed for rhinitis or allergies, Disp: , Rfl:     hydrALAZINE (APRESOLINE) 50 MG tablet, Take 1 tablet (50 mg) by mouth 2 times daily, Disp: 180 tablet, Rfl: 3    indomethacin (INDOCIN) 50 MG capsule, Take 1 capsule (50 mg) by mouth 3 times daily as needed for moderate pain, Disp: 60 capsule, Rfl: 3    losartan (COZAAR) 100 MG tablet, Take 1 tablet by mouth once daily for blood pressure, Disp: 90 tablet, Rfl: 3    metFORMIN (GLUCOPHAGE) 500 MG tablet, TAKE 1 TABLET BY MOUTH TWICE DAILY WITH MEALS FOR DIABETES, Disp: 180 tablet, Rfl: 3    MULTIPLE VITAMIN PO, Take 1 tablet by mouth daily Stopping 8/4/23 before surgery,  Disp: , Rfl:     ondansetron (ZOFRAN ODT) 4 MG ODT tab, Take 1 tablet (4 mg) by mouth every 6 hours as needed for nausea or vomiting, Disp: 8 tablet, Rfl: 0    polyethylene glycol (MIRALAX) 17 g packet, Take 17 g by mouth daily, Disp: 7 packet, Rfl: 0    primidone (MYSOLINE) 50 MG tablet, Take 50 mg in the morning and 100 mg in the evening., Disp: 270 tablet, Rfl: 1    senna-docusate (SENOKOT-S/PERICOLACE) 8.6-50 MG tablet, Take 1 tablet by mouth 2 times daily, Disp: 20 tablet, Rfl: 0    traMADol (ULTRAM) 50 MG tablet, Take 1 tablet (50 mg) by mouth every 6 hours as needed for severe pain, Disp: 15 tablet, Rfl: 0    Allergies: Berm[place] grass extract, Cats, Grass pollen(k-o-r-t-swt gisel), Lisinopril, Molds & smuts, and Shellfish allergy    Current Status:  VANESSA Jr: 52.47  UCLA: 2-3      Physical Exam:  On physical examination the patient appears the stated age, is in no acute distress, affect is appropriate, and breathing is non-labored.    There were no vitals taken for this visit.  There is no height or weight on file to calculate BMI.    Rises from chair: with some difficulty  Gait: mildly antalgic  Appearance: benign, incision well healed  Clinical alignment: neutral  Effusion: mild  Tenderness to palpation: No  Extension: 3  Flexion:  110  Collateral ligaments: intact    Cruciate ligaments: grossly intact     Stable in in the sagittal plane in mid-flexion.    Distally, the circulatory, motor, and sensation exam is intact with 5/5 EHL, gastroc-soleus, and tibialis anterior.  Sensation to light touch is intact.  Dorsalis pedis and posterior tibialis pulses are palpable.  There are no sores on the feet, no bruising, and no lymphedema.    Assessment: Bill is 8 weeks s/p right LAURA. Doing well and progressing with therapy appropriately.     Plan:   - Continue to work with PT  - Continue working on ROM  - RTC at 12 weeks postop.    No ref. provider found

## 2023-10-12 ENCOUNTER — THERAPY VISIT (OUTPATIENT)
Dept: PHYSICAL THERAPY | Facility: CLINIC | Age: 73
End: 2023-10-12
Payer: MEDICARE

## 2023-10-12 DIAGNOSIS — Z96.659 STATUS POST TOTAL KNEE REPLACEMENT, UNSPECIFIED LATERALITY: ICD-10-CM

## 2023-10-12 DIAGNOSIS — Z47.1 AFTERCARE FOLLOWING KNEE JOINT REPLACEMENT SURGERY, UNSPECIFIED LATERALITY: Primary | ICD-10-CM

## 2023-10-12 DIAGNOSIS — Z96.659 AFTERCARE FOLLOWING KNEE JOINT REPLACEMENT SURGERY, UNSPECIFIED LATERALITY: Primary | ICD-10-CM

## 2023-10-12 DIAGNOSIS — M25.561 ACUTE PAIN OF RIGHT KNEE: ICD-10-CM

## 2023-10-12 DIAGNOSIS — R26.9 ABNORMAL GAIT: ICD-10-CM

## 2023-10-12 PROCEDURE — 97110 THERAPEUTIC EXERCISES: CPT | Mod: GP | Performed by: PHYSICAL THERAPY ASSISTANT

## 2023-10-12 PROCEDURE — 97530 THERAPEUTIC ACTIVITIES: CPT | Mod: GP | Performed by: PHYSICAL THERAPY ASSISTANT

## 2023-10-19 ENCOUNTER — THERAPY VISIT (OUTPATIENT)
Dept: PHYSICAL THERAPY | Facility: CLINIC | Age: 73
End: 2023-10-19
Payer: MEDICARE

## 2023-10-19 DIAGNOSIS — R26.9 ABNORMAL GAIT: ICD-10-CM

## 2023-10-19 DIAGNOSIS — M25.561 ACUTE PAIN OF RIGHT KNEE: ICD-10-CM

## 2023-10-19 DIAGNOSIS — Z96.659 AFTERCARE FOLLOWING KNEE JOINT REPLACEMENT SURGERY, UNSPECIFIED LATERALITY: Primary | ICD-10-CM

## 2023-10-19 DIAGNOSIS — R60.0 LOCALIZED EDEMA: ICD-10-CM

## 2023-10-19 DIAGNOSIS — Z47.1 AFTERCARE FOLLOWING KNEE JOINT REPLACEMENT SURGERY, UNSPECIFIED LATERALITY: Primary | ICD-10-CM

## 2023-10-19 PROCEDURE — 97110 THERAPEUTIC EXERCISES: CPT | Mod: GP | Performed by: PHYSICAL THERAPY ASSISTANT

## 2023-10-19 PROCEDURE — 97140 MANUAL THERAPY 1/> REGIONS: CPT | Mod: GP | Performed by: PHYSICAL THERAPY ASSISTANT

## 2023-10-26 ENCOUNTER — THERAPY VISIT (OUTPATIENT)
Dept: PHYSICAL THERAPY | Facility: CLINIC | Age: 73
End: 2023-10-26
Payer: MEDICARE

## 2023-10-26 DIAGNOSIS — R60.0 LOCALIZED EDEMA: ICD-10-CM

## 2023-10-26 DIAGNOSIS — Z96.659 AFTERCARE FOLLOWING KNEE JOINT REPLACEMENT SURGERY, UNSPECIFIED LATERALITY: Primary | ICD-10-CM

## 2023-10-26 DIAGNOSIS — R26.9 ABNORMAL GAIT: ICD-10-CM

## 2023-10-26 DIAGNOSIS — Z47.1 AFTERCARE FOLLOWING KNEE JOINT REPLACEMENT SURGERY, UNSPECIFIED LATERALITY: Primary | ICD-10-CM

## 2023-10-26 DIAGNOSIS — M25.561 ACUTE PAIN OF RIGHT KNEE: ICD-10-CM

## 2023-10-26 PROCEDURE — 97530 THERAPEUTIC ACTIVITIES: CPT | Mod: GP | Performed by: PHYSICAL THERAPIST

## 2023-10-26 PROCEDURE — 97110 THERAPEUTIC EXERCISES: CPT | Mod: GP | Performed by: PHYSICAL THERAPIST

## 2023-10-26 ASSESSMENT — ACTIVITIES OF DAILY LIVING (ADL)
GO DOWN STAIRS: ACTIVITY IS MINIMALLY DIFFICULT
SWELLING: THE SYMPTOM AFFECTS MY ACTIVITY SLIGHTLY
KNEE_ACTIVITY_OF_DAILY_LIVING_SUM: 54
STAND: ACTIVITY IS MINIMALLY DIFFICULT
KNEEL ON THE FRONT OF YOUR KNEE: ACTIVITY IS SOMEWHAT DIFFICULT
HOW_WOULD_YOU_RATE_THE_OVERALL_FUNCTION_OF_YOUR_KNEE_DURING_YOUR_USUAL_DAILY_ACTIVITIES?: NEARLY NORMAL
SQUAT: ACTIVITY IS SOMEWHAT DIFFICULT
WEAKNESS: I DO NOT HAVE THE SYMPTOM
STIFFNESS: THE SYMPTOM AFFECTS MY ACTIVITY SLIGHTLY
GO UP STAIRS: ACTIVITY IS MINIMALLY DIFFICULT
RISE FROM A CHAIR: ACTIVITY IS MINIMALLY DIFFICULT
WALK: ACTIVITY IS NOT DIFFICULT
RAW_SCORE: 54
LIMPING: THE SYMPTOM AFFECTS MY ACTIVITY SLIGHTLY
HOW_WOULD_YOU_RATE_THE_CURRENT_FUNCTION_OF_YOUR_KNEE_DURING_YOUR_USUAL_DAILY_ACTIVITIES_ON_A_SCALE_FROM_0_TO_100_WITH_100_BEING_YOUR_LEVEL_OF_KNEE_FUNCTION_PRIOR_TO_YOUR_INJURY_AND_0_BEING_THE_INABILITY_TO_PERFORM_ANY_OF_YOUR_USUAL_DAILY_ACTIVITIES?: 75
PAIN: THE SYMPTOM AFFECTS MY ACTIVITY SLIGHTLY
GIVING WAY, BUCKLING OR SHIFTING OF KNEE: I DO NOT HAVE THE SYMPTOM
KNEE_ACTIVITY_OF_DAILY_LIVING_SCORE: 77.14
AS_A_RESULT_OF_YOUR_KNEE_INJURY,_HOW_WOULD_YOU_RATE_YOUR_CURRENT_LEVEL_OF_DAILY_ACTIVITY?: NEARLY NORMAL
SIT WITH YOUR KNEE BENT: ACTIVITY IS NOT DIFFICULT

## 2023-10-26 NOTE — PROGRESS NOTES
"   10/26/23 0500   Appointment Info   Signing clinician's name / credentials Zachary Scruggsmsnicole DPT, OCS   Total/Authorized Visits 17   Visits Used 13   Medical Diagnosis s/p R tka   PT Tx Diagnosis s/p R TKA   Other pertinent information \"Alex\", wife Brooke Hernandez Adds Certification;Student Supervision;PTA Supervision   Progress Note/Certification   Start of Care Date 08/18/23   Onset of illness/injury or Date of Surgery 08/11/23   Therapy Frequency 1x/week   Predicted Duration 5 weeks   Certification date from 08/18/23   Certification date to 11/20/23   Progress Note Due Date 10/31/23   Progress Note Completed Date 10/06/23   Supervision   PT Assistant Visit Number 3   GOALS   PT Goals 4;3;2   PT Goal 1   Goal Identifier walking   Goal Description Pt will be able to walk with a single point cane for 100 feet   Rationale to maximize safety and independence within the home;to maximize safety and independence within the community;to maximize safety and independence with transportation;to maximize safety and independence with self cares;to maximize safety and independence with performance of ADLs and functional tasks   Goal Progress walked 100 feet in clinic with a SPC   Target Date 09/18/23   Date Met 09/05/23   PT Goal 2   Goal Identifier walking   Goal Description Pt will be able to walk without an AD for a couple blocks   Rationale to maximize safety and independence within the community;to maximize safety and independence with transportation;to maximize safety and independence with self cares;to maximize safety and independence with performance of ADLs and functional tasks;to maximize safety and independence within the home   Goal Progress walked one mile yesterday with SEC (very light support needed with AD), progressing   Target Date 11/20/23   PT Goal 3   Goal Identifier Stairs   Goal Description pt will demonstrate eccentric control on a 4\" stepdown x10   Rationale to maximize safety and independence with " "performance of ADLs and functional tasks;to maximize safety and independence within the home;to maximize safety and independence within the community   Goal Progress fair eccentric control with 4\" lateral step down   Target Date 10/31/23   PT Goal 4   Goal Identifier Stairs   Goal Description pt will demonstrate eccentric control on a 6\" step up x10   Rationale to maximize safety and independence with performance of ADLs and functional tasks;to maximize safety and independence within the home;to maximize safety and independence within the community;to maximize safety and independence with self cares   Target Date 11/20/23   Subjective Report   Subjective Report Pt states that he has been more sore the past couple days due to overdoing it on his exercise bike. Pt states that he has 5/10 pain.   Objective Measures   Objective Measures Objective Measure 1;Objective Measure 2;Objective Measure 3;Objective Measure 4;Objective Measure 5   Objective Measure 2   Objective Measure gait   Details using cane for stability, reciprocal pattern on stairs with railing and cane; relies on hand raile and cane for eccentric control   Objective Measure 3   Objective Measure Seated AROM   Details    Objective Measure 4   Objective Measure Seated PROM   Details 0-118   Objective Measure 5   Objective Measure fair eccentric control down 4\" lateral step down, limited some by balance   Treatment Interventions (PT)   Interventions Manual Therapy;Therapeutic Procedure/Exercise;Therapeutic Activity   Therapeutic Procedure/Exercise   Therapeutic Procedures: strength, endurance, ROM, flexibillity minutes (04768) 25   Therapeutic Procedures Ther Proc 2;Ther Proc 3;Ther Proc 4;Ther Proc 5;Ther Proc 6;Ther Proc 7   Ther Proc 1 Bicycle   Ther Proc 1 - Details Seat 7 x5 minutes   Ther Proc 2 knee flex with foot on 8\" step   Ther Proc 2 - Details 5 sec hodl x 20   Ther Proc 3 gastroc stretch on wedge   Ther Proc 3 - Details 5 sec hold x 20 " "  Ther Proc 4 seated knee flex   Ther Proc 4 - Details R green TB x 20   Ther Proc 5 LAQ: R 5 sec hold   Ther Proc 5 - Details x 20   Ther Proc 6 prone knee ext stretch PT assist   Ther Proc 6 - Details 2x1 min   Skilled Intervention cuing for quad activation, decision making on progression of exercises   Patient Response/Progress good passive knee ext, active knee ext limited by strength of quads, tolerating more reps of exercises   Ther Proc 7 prone quad set   Ther Proc 7 - Details 5 sec hold x 10   Therapeutic Activity   Therapeutic Activities: dynamic activities to improve functional performance minutes (35044) 15   Therapeutic Activities Ther Act 2;Ther Act 3;Ther Act 4;Ther Act 5   Ther Act 1 4\" ant step up   Ther Act 1 - Details R 3x 15 - one hand support   Ther Act 2 3\" lateral step down   Ther Act 2 - Details R x 15 - one hand on counter, one hand on cane   Ther Act 3 mini squats at counter   Ther Act 3 - Details x 20   Skilled Intervention visual and tactile cues for correct alignment of knee   Patient Response/Progress mild pain across patella, improving quad control,   Education   Learner/Method Patient;Significant Other;No Barriers to Learning   Plan   Home program stretching at 1st barrier, prone quad sets, stairs   Plan for next session continue to push ROM, progress squat and stairs, balance exercises to improve R leg control         PLAN  Continue therapy per current plan of care.    Beginning/End Dates of Progress Note Reporting Period:  10/06/23 to 10/26/2023    Referring Provider:  Miky Jarvis    "

## 2023-10-31 ENCOUNTER — OFFICE VISIT (OUTPATIENT)
Dept: ORTHOPEDICS | Facility: CLINIC | Age: 73
End: 2023-10-31
Payer: MEDICARE

## 2023-10-31 ENCOUNTER — TRANSFERRED RECORDS (OUTPATIENT)
Dept: HEALTH INFORMATION MANAGEMENT | Facility: CLINIC | Age: 73
End: 2023-10-31

## 2023-10-31 DIAGNOSIS — Z96.659 STATUS POST TOTAL KNEE REPLACEMENT, UNSPECIFIED LATERALITY: Primary | ICD-10-CM

## 2023-10-31 LAB — RETINOPATHY: NEGATIVE

## 2023-10-31 PROCEDURE — 99024 POSTOP FOLLOW-UP VISIT: CPT | Performed by: ORTHOPAEDIC SURGERY

## 2023-10-31 ASSESSMENT — PAIN SCALES - GENERAL: PAINLEVEL: SEVERE PAIN (6)

## 2023-10-31 NOTE — LETTER
10/31/2023         RE: Bill Smith  [address]   [place] [address]        Dear Colleague,    Thank you for referring your patient, Bill Smith, to the [facility]. Please see a copy of my visit note below.    Chief Complaint: Surgical Followup (RTKA 8/11/23 Motion check. )         HPI: Bill Smith returns today in follow-up for his knee. He reports that he is doing well. ROM in PT at goal. Knee more comfortable, happy with how he is doing.     Medications and allergies are documented in the EMR and have been reviewed.      Current Outpatient Medications:      acetaminophen (TYLENOL) 325 MG tablet, Take 3 tablets (975 mg) by mouth every 8 hours, Disp: 100 tablet, Rfl: 0     allopurinol (ZYLOPRIM) 100 MG tablet, TAKE 1 TABLET BY MOUTH TWICE DAILY FOR  GOUT, Disp: 180 tablet, Rfl: 3     ascorbic acid (VITAMIN C) 1000 MG TABS, Take 1,000 mg by mouth daily, Disp: , Rfl:      bevacizumab (AVASTIN) 25 mg/mL intravitreal inj, by Intravitreal route once Every 7 weeks, Disp: , Rfl:      carvedilol (COREG) 25 MG tablet, TAKE 1 TABLET BY MOUTH TWICE DAILY WITH MEALS FOR BLOOD PRESSURE, Disp: 180 tablet, Rfl: 3     chlorthalidone (HYGROTON) 25 MG tablet, Take 1 tablet (25 mg) by mouth every morning For blood pressure., Disp: 90 tablet, Rfl: 3     fluticasone (FLONASE) 50 MCG/ACT nasal spray, Spray 1 spray into both nostrils daily as needed for rhinitis or allergies, Disp: , Rfl:      hydrALAZINE (APRESOLINE) 50 MG tablet, Take 1 tablet (50 mg) by mouth 2 times daily, Disp: 180 tablet, Rfl: 3     indomethacin (INDOCIN) 50 MG capsule, Take 1 capsule (50 mg) by mouth 3 times daily as needed for moderate pain, Disp: 60 capsule, Rfl: 3     losartan (COZAAR) 100 MG tablet, Take 1 tablet by mouth once daily for blood pressure, Disp: 90 tablet, Rfl: 3     metFORMIN (GLUCOPHAGE) 500 MG tablet, TAKE 1 TABLET BY MOUTH TWICE DAILY WITH MEALS FOR DIABETES, Disp: 180 tablet, Rfl: 3      MULTIPLE VITAMIN PO, Take 1 tablet by mouth daily Stopping 8/4/23 before surgery, Disp: , Rfl:      ondansetron (ZOFRAN ODT) 4 MG ODT tab, Take 1 tablet (4 mg) by mouth every 6 hours as needed for nausea or vomiting (Patient not taking: Reported on 11/13/2023), Disp: 8 tablet, Rfl: 0     polyethylene glycol (MIRALAX) 17 g packet, Take 17 g by mouth daily (Patient not taking: Reported on 11/13/2023), Disp: 7 packet, Rfl: 0     primidone (MYSOLINE) 50 MG tablet, Take 50 mg in the morning and 100 mg in the evening., Disp: 270 tablet, Rfl: 1     senna-docusate (SENOKOT-S/PERICOLACE) 8.6-50 MG tablet, Take 1 tablet by mouth 2 times daily (Patient not taking: Reported on 11/13/2023), Disp: 20 tablet, Rfl: 0     traMADol (ULTRAM) 50 MG tablet, Take 1 tablet (50 mg) by mouth every 6 hours as needed for severe pain (Patient not taking: Reported on 11/13/2023), Disp: 15 tablet, Rfl: 0    Allergies: Bermuda grass extract, Cats, Grass pollen(k-o-r-t-swt gisel), Lisinopril, Molds & smuts, and Shellfish allergy    Current Status:  VANESSA Jr:  UCLA:  Global Mental Health Score - (P) 16  Global Physical Health Score - (P) 15  PROMIS TOTAL - SUBSCORES - (P) 31    Physical Exam:  On physical examination the patient appears the stated age, is in no acute distress, affect is appropriate, and breathing is non-labored.    There were no vitals taken for this visit.  There is no height or weight on file to calculate BMI.    Rises from chair: easily   Gait: mild flexed knee gait   Appearance: benign  Clinical alignment: neutral   Effusion:no  Tenderness to palpation:min  Extension:0  Flexion: 115  Patellar tracking:normal   Collateral ligaments: intact  Stable in in the sagittal plane in mid-flexion.    Assessment: doing well. Discussed activities on total knee, discussed follow-up protocol, discuss abx and dentistry. All the patient's questions were answered to the best of my ability.       Plan: RTC in one year, sooner if issues.   No ref.  provider found      Again, thank you for allowing me to participate in the care of your patient.        Sincerely,        iMky Jarvis MD

## 2023-10-31 NOTE — NURSING NOTE
Bill Smith's chief complaint for this visit includes:  Chief Complaint   Patient presents with    Surgical Followup     RTKA 8/11/23 Motion check.        Referring Provider:  No referring provider defined for this encounter.    There were no vitals taken for this visit.  Severe Pain (6)   Global Mental Health Score: (P) 16  Global Physical Health Score: (P) 15  PROMIS TOTAL - SUBSCORES: (P) 31  UCLA: 5  KOOS Jr.  73.34    Pain increases with: flexion past 90*  Previous surgeries: RTKA 8/11/23  Previous injections within last 6 months: NO  Treatments done: PT - going good, 1 more visit on 11/8  Imaging completed: n/a  Pain: 7/10  Concerns: Healing slower than hoped. May be doing to much activity level at home.     Herrera Winn, ATC

## 2023-11-07 ENCOUNTER — TELEPHONE (OUTPATIENT)
Dept: ORTHOPEDICS | Facility: CLINIC | Age: 73
End: 2023-11-07
Payer: MEDICARE

## 2023-11-07 NOTE — TELEPHONE ENCOUNTER
[facility] Call Center    Phone Message    May a detailed message be left on voicemail: yes     Reason for Call: Lynnette with [facility] in [place] patient is in clinic for a dental cleaning, needs to know if he needs a antibiotic. Please call [phone]    Action Taken: Other: [facility]    Travel Screening: Not Applicable

## 2023-11-07 NOTE — TELEPHONE ENCOUNTER
Called and confirmed need for Abx for 2 years and to wait until Feb 2024 for any non-emergency based visits.

## 2023-11-08 ENCOUNTER — THERAPY VISIT (OUTPATIENT)
Dept: PHYSICAL THERAPY | Facility: CLINIC | Age: 73
End: 2023-11-08
Payer: MEDICARE

## 2023-11-08 DIAGNOSIS — R26.9 ABNORMAL GAIT: ICD-10-CM

## 2023-11-08 DIAGNOSIS — R60.0 LOCALIZED EDEMA: ICD-10-CM

## 2023-11-08 DIAGNOSIS — Z47.1 AFTERCARE FOLLOWING KNEE JOINT REPLACEMENT SURGERY, UNSPECIFIED LATERALITY: Primary | ICD-10-CM

## 2023-11-08 DIAGNOSIS — M25.561 ACUTE PAIN OF RIGHT KNEE: ICD-10-CM

## 2023-11-08 DIAGNOSIS — Z96.659 AFTERCARE FOLLOWING KNEE JOINT REPLACEMENT SURGERY, UNSPECIFIED LATERALITY: Primary | ICD-10-CM

## 2023-11-08 PROCEDURE — 97110 THERAPEUTIC EXERCISES: CPT | Mod: GP | Performed by: PHYSICAL THERAPY ASSISTANT

## 2023-11-08 PROCEDURE — 97530 THERAPEUTIC ACTIVITIES: CPT | Mod: GP | Performed by: PHYSICAL THERAPY ASSISTANT

## 2023-11-08 ASSESSMENT — ACTIVITIES OF DAILY LIVING (ADL)
AS_A_RESULT_OF_YOUR_KNEE_INJURY,_HOW_WOULD_YOU_RATE_YOUR_CURRENT_LEVEL_OF_DAILY_ACTIVITY?: NEARLY NORMAL
HOW_WOULD_YOU_RATE_THE_OVERALL_FUNCTION_OF_YOUR_KNEE_DURING_YOUR_USUAL_DAILY_ACTIVITIES?: NEARLY NORMAL
GIVING WAY, BUCKLING OR SHIFTING OF KNEE: I HAVE THE SYMPTOM BUT IT DOES NOT AFFECT MY ACTIVITY
STAND: ACTIVITY IS MINIMALLY DIFFICULT
WALK: ACTIVITY IS MINIMALLY DIFFICULT
GO UP STAIRS: ACTIVITY IS SOMEWHAT DIFFICULT
PAIN: THE SYMPTOM AFFECTS MY ACTIVITY SLIGHTLY
KNEEL ON THE FRONT OF YOUR KNEE: ACTIVITY IS VERY DIFFICULT
SQUAT: ACTIVITY IS VERY DIFFICULT
SIT WITH YOUR KNEE BENT: ACTIVITY IS MINIMALLY DIFFICULT
WEAKNESS: THE SYMPTOM AFFECTS MY ACTIVITY SLIGHTLY
KNEE_ACTIVITY_OF_DAILY_LIVING_SUM: 44
RISE FROM A CHAIR: ACTIVITY IS SOMEWHAT DIFFICULT
STIFFNESS: THE SYMPTOM AFFECTS MY ACTIVITY SLIGHTLY
LIMPING: THE SYMPTOM AFFECTS MY ACTIVITY SLIGHTLY
HOW_WOULD_YOU_RATE_THE_CURRENT_FUNCTION_OF_YOUR_KNEE_DURING_YOUR_USUAL_DAILY_ACTIVITIES_ON_A_SCALE_FROM_0_TO_100_WITH_100_BEING_YOUR_LEVEL_OF_KNEE_FUNCTION_PRIOR_TO_YOUR_INJURY_AND_0_BEING_THE_INABILITY_TO_PERFORM_ANY_OF_YOUR_USUAL_DAILY_ACTIVITIES?: 75
GO DOWN STAIRS: ACTIVITY IS SOMEWHAT DIFFICULT
RAW_SCORE: 44
KNEE_ACTIVITY_OF_DAILY_LIVING_SCORE: 62.86
SWELLING: I DO NOT HAVE THE SYMPTOM

## 2023-11-09 PROBLEM — M25.561 ACUTE PAIN OF RIGHT KNEE: Status: RESOLVED | Noted: 2023-08-18 | Resolved: 2023-11-09

## 2023-11-09 PROBLEM — Z96.659 AFTERCARE FOLLOWING KNEE JOINT REPLACEMENT SURGERY, UNSPECIFIED LATERALITY: Status: RESOLVED | Noted: 2023-08-18 | Resolved: 2023-11-09

## 2023-11-09 PROBLEM — R60.0 LOCALIZED EDEMA: Status: RESOLVED | Noted: 2023-08-18 | Resolved: 2023-11-09

## 2023-11-09 PROBLEM — Z47.1 AFTERCARE FOLLOWING KNEE JOINT REPLACEMENT SURGERY, UNSPECIFIED LATERALITY: Status: RESOLVED | Noted: 2023-08-18 | Resolved: 2023-11-09

## 2023-11-09 PROBLEM — R26.9 ABNORMAL GAIT: Status: RESOLVED | Noted: 2023-08-18 | Resolved: 2023-11-09

## 2023-11-09 NOTE — PROGRESS NOTES
"   11/08/23 0500   Appointment Info   Signing clinician's name / credentials Meggan Hernandez PTA   Total/Authorized Visits 16   Visits Used 14   Medical Diagnosis s/p R tka   PT Tx Diagnosis s/p R TKA   Other pertinent information \"Alex\", wife Brooke Hernandez Adds Certification;PTA Supervision   Progress Note/Certification   Start of Care Date 08/18/23   Onset of illness/injury or Date of Surgery 08/11/23   Therapy Frequency 1x/week   Predicted Duration 4 weeks   Certification date from 08/18/23   Certification date to 11/20/23   Progress Note Due Date 10/31/23   Progress Note Completed Date 11/08/23   Supervision   PT Assistant Visit Number 3   Assistant Supervision PTA visit observed, intervention appropriate, plan of care reviewed and modified   GOALS   PT Goals 4;3;2   PT Goal 1   Goal Identifier walking   Goal Description Pt will be able to walk with a single point cane for 100 feet   Rationale to maximize safety and independence within the home;to maximize safety and independence within the community;to maximize safety and independence with transportation;to maximize safety and independence with self cares;to maximize safety and independence with performance of ADLs and functional tasks   Goal Progress walked 100 feet in clinic with a SPC   Target Date 09/18/23   Date Met 09/05/23   PT Goal 2   Goal Identifier walking   Goal Description Pt will be able to walk without an AD for a couple blocks   Rationale to maximize safety and independence within the community;to maximize safety and independence with transportation;to maximize safety and independence with self cares;to maximize safety and independence with performance of ADLs and functional tasks;to maximize safety and independence within the home   Goal Progress up to 1 month with light support on SEC, short distance only without cane-goal not met   Target Date 11/20/23   PT Goal 3   Goal Identifier Stairs   Goal Description pt will demonstrate eccentric " "control on a 4\" stepdown x10   Rationale to maximize safety and independence with performance of ADLs and functional tasks;to maximize safety and independence within the home;to maximize safety and independence within the community   Goal Progress fair eccentric control with 4\" lateral step down, anterior step down 3\"-goal not met   Target Date 10/31/23   PT Goal 4   Goal Identifier Stairs   Goal Description pt will demonstrate eccentric control on a 6\" step up x10   Rationale to maximize safety and independence with performance of ADLs and functional tasks;to maximize safety and independence within the home;to maximize safety and independence within the community;to maximize safety and independence with self cares   Target Date 11/20/23   Subjective Report   Subjective Report Pt has been seen for 14 PT sessions reporting 75% improved with daily activites. Current complaints of stiffness in R knee with occasional pain when LE is fatigued. Pt ambulating with SEC   Objective Measures   Objective Measures Objective Measure 1;Objective Measure 2;Objective Measure 3;Objective Measure 4;Objective Measure 5   Objective Measure 2   Objective Measure gait   Details using cane for stability, reciprocal pattern on stairs with railing and cane; relies on hand raile and cane for eccentric control   Objective Measure 3   Objective Measure Seated AROM   Details    Objective Measure 4   Objective Measure Seated PROM   Details 0-118   Objective Measure 5   Objective Measure fair eccentric control down 4\" lateral step down, limited some by balance   Treatment Interventions (PT)   Interventions Manual Therapy;Therapeutic Procedure/Exercise;Therapeutic Activity   Therapeutic Procedure/Exercise   Therapeutic Procedures: strength, endurance, ROM, flexibillity minutes (17242) 25   Therapeutic Procedures Ther Proc 2;Ther Proc 3;Ther Proc 4;Ther Proc 5;Ther Proc 6;Ther Proc 7   Ther Proc 1 Bicycle   Ther Proc 1 - Details Seat 8 x5 " "minutes   Ther Proc 2 knee flex with foot on 8\" step   Ther Proc 2 - Details 5 sec hodl x 20   Ther Proc 3 gastroc stretch on wedge   Ther Proc 3 - Details 5 sec hold x 20   Ther Proc 4 seated knee flex   Ther Proc 4 - Details R green TB x 20   Ther Proc 5 LAQ: R 5 sec hold   Ther Proc 5 - Details x 20   Ther Proc 6 supine passive ext stretch   Ther Proc 6 - Details 2x1 min   Skilled Intervention decision making on progression of exercises   Patient Response/Progress overall improving, gave verbal review of [place]   Therapeutic Activity   Therapeutic Activities: dynamic activities to improve functional performance minutes (44935) 15   Therapeutic Activities Ther Act 2;Ther Act 3;Ther Act 4;Ther Act 5   Ther Act 1 4\",6\", 7\" ant step up   Ther Act 1 - Details R 3x 15 - one hand support for 4\", requires 2 had support for 6 and 7\"   Ther Act 2 3\" lateral step down   Ther Act 2 - Details R x 15 - one hand on counter, one hand on cane   Ther Act 3 sit<>stand w/UE support   Ther Act 3 - Details 2 x 10   Ther Act 4 able to ambulate stairs reciprocally although needs B UE support   Patient Response/Progress requires 2 hand assist for higher height step   Education   Learner/Method Patient;Significant Other;No Barriers to Learning   Plan   Home program updated ptrx for continued HEP   Updates to plan of care DC   Comments   Comments PN written in collaboration with Zachary Duvall PT   Total Session Time   Timed Code Treatment Minutes 40   Total Treatment Time (sum of timed and untimed services) 40         DISCHARGE  Reason for Discharge: Pt is progressing towards his goals.  He is independent with his HEP.  He is moving back to [place] for the winter.    Equipment Issued: none    Discharge Plan: Patient to continue home program.    Referring Provider:  Miky Jarvis    "

## 2023-11-10 ENCOUNTER — TELEPHONE (OUTPATIENT)
Dept: NEUROLOGY | Facility: CLINIC | Age: 73
End: 2023-11-10
Payer: MEDICARE

## 2023-11-13 ENCOUNTER — OFFICE VISIT (OUTPATIENT)
Dept: NEUROLOGY | Facility: CLINIC | Age: 73
End: 2023-11-13
Payer: MEDICARE

## 2023-11-13 VITALS — DIASTOLIC BLOOD PRESSURE: 72 MMHG | OXYGEN SATURATION: 97 % | HEART RATE: 80 BPM | SYSTOLIC BLOOD PRESSURE: 129 MMHG

## 2023-11-13 DIAGNOSIS — R20.2 RIGHT LEG PARESTHESIAS: ICD-10-CM

## 2023-11-13 DIAGNOSIS — G25.0 ESSENTIAL TREMOR: Primary | ICD-10-CM

## 2023-11-13 PROCEDURE — 99214 OFFICE O/P EST MOD 30 MIN: CPT | Performed by: PSYCHIATRY & NEUROLOGY

## 2023-11-13 RX ORDER — PRIMIDONE 50 MG/1
TABLET ORAL
Qty: 270 TABLET | Refills: 1 | Status: SHIPPED | OUTPATIENT
Start: 2023-11-13 | End: 2024-05-28

## 2023-11-13 NOTE — PROGRESS NOTES
"Bill Smith is a 73 year old male who presents for:  Chief Complaint   Patient presents with    Follow Up     Tremor- patient reports it is stable meds do help         Initial Vitals:  /72 (BP Location: Left arm, Patient Position: Sitting, Cuff Size: Adult Large)   Pulse 80   SpO2 97%  Estimated body mass index is 31.19 kg/m  as calculated from the following:    Height as of 10/6/23: 1.861 m (6' 1.25\").    Weight as of 10/6/23: 108 kg (238 lb).. There is no height or weight on file to calculate BSA. BP completed using cuff size: large    Darling Garrido Clinic Assistant  "

## 2023-11-13 NOTE — PROGRESS NOTES
ESTABLISHED PATIENT NEUROLOGY NOTE    DATE OF VISIT: 11/13/2023  CLINIC LOCATION: [facility]  MRN: [id number]  PATIENT NAME: Bill Smith  YOB: 1950    REASON FOR VISIT:   Chief Complaint   Patient presents with    Follow Up     Tremor- patient reports it is stable meds do help      SUBJECTIVE:                                                      HISTORY OF PRESENT ILLNESS: Patient is here to follow up regarding essential tremor and right leg paresthesia.  The last visit was on 7/26/2023.  At that time the dose of primidone was further increased.  Please refer to my initial/other prior notes for further information.    Since the last visit, the patient reports that his tremor is under good control.  He is on 50 mg of primidone in the morning and 100 mg in the evening.  He feels that this dose is working well and tolerated.  His right leg paresthesias are stable, but he still recovers after his knee surgery.  He denies interval development of new focal neurological symptoms.  Leaves to [place] tomorrow to spend winter (plans to come back in May 2024).  EXAM:                                                    Physical Exam:   Vitals: /72 (BP Location: Left arm, Patient Position: Sitting, Cuff Size: Adult Large)   Pulse 80   SpO2 97%     General: pt is in NAD, cooperative.  Skin: normal turgor, moist mucous membranes, no lesions/rashes noticed.  HEENT: ATNC, white sclera, normal conjunctiva.  Respiratory: Symmetric lung excursion, no accessory respiratory muscle use.  Abdomen: Non distended.  Neurological: awake, cooperative, follows commands, no exam changes compared to the previous visits.    ASSESSMENT AND PLAN:                                                    Assessment: 73 year old male patient presents for follow-up of essential tremor and right leg paresthesia.  He feels that increasing the dose of primidone was helpful.  He tolerates it well.  Previously  we discussed that we might increase it further to 100 mg twice daily if needed, but I do not think that it is necessary at this point.  I refilled his prescription for the next 6 months which he plans to spent in [place].  I will see him in follow-up when he comes back to [place].    Regarding his right leg paresthesia, knee surgery went well, but he still recovers.  Previously we discussed EMG to clarify his diagnosis, as his differential includes peroneal neuropathy versus lumbar radiculopathy.  We will discuss that when he comes back for follow-up.    Diagnoses:    ICD-10-CM    1. Essential tremor  G25.0       2. Right leg paresthesias  R20.2         Plan: At today's visit we thoroughly discussed current symptoms, available treatment options, and the plan.    We decided not to make any medication changes.  Primidone prescription was refilled.    Next follow-up appointment is in the next 6 months or earlier if needed.    Total Time: 15 minutes spent on the date of the encounter doing chart review, history and exam, documentation and further activities per the note.    Guillermo Rivera MD  [facility] Neurology  (Chart documentation was completed in part with Dragon voice-recognition software. Even though reviewed, some grammatical, spelling, and word errors may remain.)

## 2023-11-13 NOTE — LETTER
11/13/2023         RE: Bill Smith  [address]   [place] [address]        Dear Colleague,    Thank you for referring your patient, Bill Smith, to the [facility]. Please see a copy of my visit note below.    ESTABLISHED PATIENT NEUROLOGY NOTE    DATE OF VISIT: 11/13/2023  CLINIC LOCATION: [facility]  MRN: [id number]  PATIENT NAME: Bill Smith  YOB: 1950    REASON FOR VISIT:   Chief Complaint   Patient presents with     Follow Up     Tremor- patient reports it is stable meds do help      SUBJECTIVE:                                                      HISTORY OF PRESENT ILLNESS: Patient is here to follow up regarding essential tremor and right leg paresthesia.  The last visit was on 7/26/2023.  At that time the dose of primidone was further increased.  Please refer to my initial/other prior notes for further information.    Since the last visit, the patient reports that his tremor is under good control.  He is on 50 mg of primidone in the morning and 100 mg in the evening.  He feels that this dose is working well and tolerated.  His right leg paresthesia are able, but he still recovers left knee surgery.  He denies interval development of new focal neurological symptoms.  Leaves to [place] tomorrow to spend winter (plans to come back in May 2024).  EXAM:                                                    Physical Exam:   Vitals: /72 (BP Location: Left arm, Patient Position: Sitting, Cuff Size: Adult Large)   Pulse 80   SpO2 97%     General: pt is in NAD, cooperative.  Skin: normal turgor, moist mucous membranes, no lesions/rashes noticed.  HEENT: ATNC, white sclera, normal conjunctiva.  Respiratory: Symmetric lung excursion, no accessory respiratory muscle use.  Abdomen: Non distended.  Neurological: awake, cooperative, follows commands, no exam changes compared to the previous visits.    ASSESSMENT AND  PLAN:                                                    Assessment: 73 year old male patient presents for follow-up of essential tremor and right leg paresthesia.  He feels that increasing the dose of primidone was helpful.  He tolerates it well.  Previously we discussed that we might increase it further to 100 mg twice daily if needed, but I do not think that it is necessary at this point.  I refilled his prescription for the next 6 months which he plans to spent in [place].  I will see him in follow-up when he comes back to [place].    Regarding his right leg paresthesia, knee surgery went well, but he still recovers.  Previously we discussed EMG to clarify his diagnosis, as his differential includes peroneal neuropathy versus lumbar radiculopathy.  We will discuss that when he comes back for follow-up.    Diagnoses:    ICD-10-CM    1. Essential tremor  G25.0       2. Right leg paresthesias  R20.2         Plan: At today's visit we thoroughly discussed current symptoms, available treatment options, and the plan.    We decided not to make any medication changes.  Primidone prescription was refilled.    Next follow-up appointment is in the next 6 months or earlier if needed.    Total Time: 15 minutes spent on the date of the encounter doing chart review, history and exam, documentation and further activities per the note.    Guillermo Rivera MD  [facility] Neurology  (Chart documentation was completed in part with Dragon voice-recognition software. Even though reviewed, some grammatical, spelling, and word errors may remain.)    Bill Smith is a 73 year old male who presents for:  Chief Complaint   Patient presents with     Follow Up     Tremor- patient reports it is stable meds do help         Initial Vitals:  /72 (BP Location: Left arm, Patient Position: Sitting, Cuff Size: Adult Large)   Pulse 80   SpO2 97%  Estimated body mass index is 31.19 kg/m  as calculated from the  "following:    Height as of 10/6/23: 1.861 m (6' 1.25\").    Weight as of 10/6/23: 108 kg (238 lb).. There is no height or weight on file to calculate BSA. BP completed using cuff size: daniel Garrido Clinic Assistant      Again, thank you for allowing me to participate in the care of your patient.        Sincerely,        Guillermo Rivera MD  "

## 2023-11-13 NOTE — PATIENT INSTRUCTIONS
AFTER VISIT SUMMARY (AVS):    At today's visit we thoroughly discussed current symptoms, available treatment options, and the plan.    We decided not to make any medication changes.  Primidone prescription was refilled.    Next follow-up appointment is in the next 6 months or earlier if needed.    Please do not hesitate to call me with any questions or concerns.    Thanks.

## 2023-12-04 NOTE — PROGRESS NOTES
Chief Complaint: Surgical Followup (RTKA 8/11/23 Motion check. )         HPI: Bill Smith returns today in follow-up for his knee. He reports that he is doing well. ROM in PT at goal. Knee more comfortable, happy with how he is doing.     Medications and allergies are documented in the EMR and have been reviewed.      Current Outpatient Medications:     acetaminophen (TYLENOL) 325 MG tablet, Take 3 tablets (975 mg) by mouth every 8 hours, Disp: 100 tablet, Rfl: 0    allopurinol (ZYLOPRIM) 100 MG tablet, TAKE 1 TABLET BY MOUTH TWICE DAILY FOR  GOUT, Disp: 180 tablet, Rfl: 3    ascorbic acid (VITAMIN C) 1000 MG TABS, Take 1,000 mg by mouth daily, Disp: , Rfl:     bevacizumab (AVASTIN) 25 mg/mL intravitreal inj, by Intravitreal route once Every 7 weeks, Disp: , Rfl:     carvedilol (COREG) 25 MG tablet, TAKE 1 TABLET BY MOUTH TWICE DAILY WITH MEALS FOR BLOOD PRESSURE, Disp: 180 tablet, Rfl: 3    chlorthalidone (HYGROTON) 25 MG tablet, Take 1 tablet (25 mg) by mouth every morning For blood pressure., Disp: 90 tablet, Rfl: 3    fluticasone (FLONASE) 50 MCG/ACT nasal spray, Spray 1 spray into both nostrils daily as needed for rhinitis or allergies, Disp: , Rfl:     hydrALAZINE (APRESOLINE) 50 MG tablet, Take 1 tablet (50 mg) by mouth 2 times daily, Disp: 180 tablet, Rfl: 3    indomethacin (INDOCIN) 50 MG capsule, Take 1 capsule (50 mg) by mouth 3 times daily as needed for moderate pain, Disp: 60 capsule, Rfl: 3    losartan (COZAAR) 100 MG tablet, Take 1 tablet by mouth once daily for blood pressure, Disp: 90 tablet, Rfl: 3    metFORMIN (GLUCOPHAGE) 500 MG tablet, TAKE 1 TABLET BY MOUTH TWICE DAILY WITH MEALS FOR DIABETES, Disp: 180 tablet, Rfl: 3    MULTIPLE VITAMIN PO, Take 1 tablet by mouth daily Stopping 8/4/23 before surgery, Disp: , Rfl:     ondansetron (ZOFRAN ODT) 4 MG ODT tab, Take 1 tablet (4 mg) by mouth every 6 hours as needed for nausea or vomiting (Patient not taking: Reported on 11/13/2023), Disp: 8  tablet, Rfl: 0    polyethylene glycol (MIRALAX) 17 g packet, Take 17 g by mouth daily (Patient not taking: Reported on 11/13/2023), Disp: 7 packet, Rfl: 0    primidone (MYSOLINE) 50 MG tablet, Take 50 mg in the morning and 100 mg in the evening., Disp: 270 tablet, Rfl: 1    senna-docusate (SENOKOT-S/PERICOLACE) 8.6-50 MG tablet, Take 1 tablet by mouth 2 times daily (Patient not taking: Reported on 11/13/2023), Disp: 20 tablet, Rfl: 0    traMADol (ULTRAM) 50 MG tablet, Take 1 tablet (50 mg) by mouth every 6 hours as needed for severe pain (Patient not taking: Reported on 11/13/2023), Disp: 15 tablet, Rfl: 0    Allergies: Berm[place] grass extract, Cats, Grass pollen([id number]-swt gisel), Lisinopril, Molds & smuts, and Shellfish allergy    Current Status:  VANESSA Jr:  [facility]:  Global Mental Health Score - (P) 16  Global Physical Health Score - (P) 15  PROMIS TOTAL - SUBSCORES - (P) 31    Physical Exam:  On physical examination the patient appears the stated age, is in no acute distress, affect is appropriate, and breathing is non-labored.    There were no vitals taken for this visit.  There is no height or weight on file to calculate BMI.    Rises from chair: easily   Gait: mild flexed knee gait   Appearance: benign  Clinical alignment: neutral   Effusion:no  Tenderness to palpation:min  Extension:0  Flexion: 115  Patellar tracking:normal   Collateral ligaments: intact  Stable in in the sagittal plane in mid-flexion.    Assessment: doing well. Discussed activities on total knee, discussed follow-up protocol, discuss abx and dentistry. All the patient's questions were answered to the best of my ability.       Plan: RTC in one year, sooner if issues.   No ref. provider found

## 2023-12-21 NOTE — PROGRESS NOTES
Bill Smith comes into clinic today at the request of Dr. Jarvis for post op wound check.    S: The patient is status post Right total knee arthroplasty, DOS: 8/11/23.   There has been no history of infection or drainage. Pt complains of significant swelling and moderate pain throughout right leg. Pt reports elevating and icing with relief. Currently taking Tylenol and occasional Tramadol for pain.     O/A: Post op dressing intact. This was removed without issue. No suture tails seen. Incision is well approximated, clean, dry, intact, and no signs of infection. Pt does have significant swelling and expected bruising throughout the right leg. He has only been elevating in his sit/stand reclining chair. He been to PT once in [place].      P: We thoroughly discussed the importance of elevating and also reviewed compression via ACE wraps. I wrapped the Pt's right leg from foot to above knee and provided them with additional ACE wraps. All questions answered. Routine wound care discussed. The patient will follow up in 4 weeks, per post op plan. If there are any concerns or signs and symptoms of infection, patient will reach out to the clinic. Patient is agreeable with plan.    This service provided today was under the direct supervision of Dr. Jarvis, who was available if needed.    Gera Jung RN    Brittany Connor

## 2024-02-10 ENCOUNTER — HEALTH MAINTENANCE LETTER (OUTPATIENT)
Age: 74
End: 2024-02-10

## 2024-03-06 ENCOUNTER — TELEPHONE (OUTPATIENT)
Dept: FAMILY MEDICINE | Facility: CLINIC | Age: 74
End: 2024-03-06
Payer: MEDICARE

## 2024-03-06 NOTE — TELEPHONE ENCOUNTER
Patient Quality Outreach    Patient is due for the following:   Diabetes -  A1C    Next Steps:   Schedule a office visit for diabetes follow up    Type of outreach:    Sent letter.      Questions for provider review:    None           Rama Sears MA

## 2024-05-24 ENCOUNTER — TELEPHONE (OUTPATIENT)
Dept: NEUROLOGY | Facility: CLINIC | Age: 74
End: 2024-05-24
Payer: MEDICARE

## 2024-05-24 NOTE — TELEPHONE ENCOUNTER
Left appointment reminder voicemail for 05/28. Instructed to call [phone] if this appointment needs to be changed or rescheduled. AS

## 2024-05-28 ENCOUNTER — OFFICE VISIT (OUTPATIENT)
Dept: NEUROLOGY | Facility: CLINIC | Age: 74
End: 2024-05-28
Payer: MEDICARE

## 2024-05-28 VITALS — OXYGEN SATURATION: 97 % | SYSTOLIC BLOOD PRESSURE: 138 MMHG | HEART RATE: 71 BPM | DIASTOLIC BLOOD PRESSURE: 81 MMHG

## 2024-05-28 DIAGNOSIS — R20.2 RIGHT LEG PARESTHESIAS: ICD-10-CM

## 2024-05-28 DIAGNOSIS — G25.0 ESSENTIAL TREMOR: Primary | ICD-10-CM

## 2024-05-28 PROCEDURE — G2211 COMPLEX E/M VISIT ADD ON: HCPCS | Performed by: PSYCHIATRY & NEUROLOGY

## 2024-05-28 PROCEDURE — 99214 OFFICE O/P EST MOD 30 MIN: CPT | Performed by: PSYCHIATRY & NEUROLOGY

## 2024-05-28 RX ORDER — PRIMIDONE 50 MG/1
TABLET ORAL
Qty: 270 TABLET | Refills: 1 | Status: SHIPPED | OUTPATIENT
Start: 2024-05-28 | End: 2024-10-07

## 2024-05-28 NOTE — PROGRESS NOTES
"Bill Smith is a 73 year old male who presents for:  Chief Complaint   Patient presents with    Follow Up     Tremor- stable some good days some days   Paresthesia- right leg still has balance and strength concerns         Initial Vitals:  /81 (BP Location: Right arm, Patient Position: Sitting, Cuff Size: Adult Large)   Pulse 71   SpO2 97%  Estimated body mass index is 31.19 kg/m  as calculated from the following:    Height as of 10/6/23: 1.861 m (6' 1.25\").    Weight as of 10/6/23: 108 kg (238 lb).. There is no height or weight on file to calculate BSA. BP completed using cuff size: daniel Garrido   "

## 2024-05-28 NOTE — PROGRESS NOTES
ESTABLISHED PATIENT NEUROLOGY NOTE    DATE OF VISIT: 5/28/2024  CLINIC LOCATION: [facility]  MRN: [id number]  PATIENT NAME: Bill Smith  YOB: 1950    REASON FOR VISIT:   Chief Complaint   Patient presents with    Follow Up     Tremor- stable some good days some days   Paresthesia- right leg still has balance and strength concerns      SUBJECTIVE:                                                      HISTORY OF PRESENT ILLNESS: Patient is here to follow up regarding essential tremor and right leg paresthesia.  He was last seen on 11/13/2023.  Please refer to my initial/other prior notes for further information.    Since the last visit, the patient reports that his tremor is stable.  He is on 50 mg of primidone in the morning and 100 mg in the evening.  No significant side effects.  His right leg paresthesia is still a problem.  He feels that he did not fully recover after his right knee surgery, which affects his right leg strength and balance, but also experiences intermittent swelling of the right leg (chronic and started prior to his surgery) and paresthesias.  Denies interval development of new neurological symptoms.  Came back from [place] recently.  Plans to go back in November.  EXAM:                                                    Physical Exam:   Vitals: /81 (BP Location: Right arm, Patient Position: Sitting, Cuff Size: Adult Large)   Pulse 71   SpO2 97%     General: pt is in NAD, cooperative.  Skin: normal turgor, moist mucous membranes, no lesions/rashes noticed.  HEENT: ATNC, white sclera, normal conjunctiva.  Respiratory: Symmetric lung excursion, no accessory respiratory muscle use.  Abdomen: Non distended.  Neurological: awake, cooperative, follows commands, no aphasia or dysarthria noted, cranial nerves II-XII: no ptosis, extraocular motility is full, face is symmetric, tongue is midline, equally moves all extremities, normal tone in upper and  lower extremities, sensation to the light touch is intact bilaterally, pronator drift is negative, finger to nose intact bilaterally, casual gait is normal.  ASSESSMENT AND PLAN:                                                    Assessment: 73 year old male patient presents for follow-up of essential tremor and right leg paresthesia.  He feels that his tremor is stable.  We discussed that the dose of primidone could be increased in the future, but decided not to make any medication changes because currently tremor does not affect his activities of daily living.  Will revisit dose increase before he goes back to [place].    Regarding his right leg paresthesia, previously we discussed EMG to clarify his diagnosis.  The patient is open to this idea, and I placed the order.  The differential includes peroneal neuropathy versus lumbar sacral radiculopathy.    Diagnoses:    ICD-10-CM    1. Essential tremor  G25.0       2. Right leg paresthesias  R20.2         Plan: At today's visit we thoroughly discussed current symptoms, available treatment options, and the plan.    We decided not to make changes to primidone dose.  I refilled the prescription.    Regarding right leg numbness and tingling, we decided to do EMG.    Next follow-up appointment is in the next 8-12 weeks or earlier if needed.    Total Time: 21 minutes spent on the date of the encounter doing chart review, history and exam, documentation and further activities per the note.    Guillermo Rivera MD  [facility]  (Chart documentation was completed in part with Dragon voice-recognition software. Even though reviewed, some grammatical, spelling, and word errors may remain.)

## 2024-05-28 NOTE — LETTER
5/28/2024         RE: Bill Smith  [address] [facility]  [place] [address]        Dear Colleague,    Thank you for referring your patient, Bill Smith, to the [facility]. Please see a copy of my visit note below.    ESTABLISHED PATIENT NEUROLOGY NOTE    DATE OF VISIT: 5/28/2024  CLINIC LOCATION: [facility]  MRN: [id number]  PATIENT NAME: Bill Smith  YOB: 1950    REASON FOR VISIT:   Chief Complaint   Patient presents with     Follow Up     Tremor- stable some good days some days   Paresthesia- right leg still has balance and strength concerns      SUBJECTIVE:                                                      HISTORY OF PRESENT ILLNESS: Patient is here to follow up regarding essential tremor and right leg paresthesia.  He was last seen on 11/13/2023.  Please refer to my initial/other prior notes for further information.    Since the last visit, the patient reports that his tremor is stable.  He is on 50 mg of primidone in the morning and 100 mg in the evening.  No significant side effects.  His right leg paresthesia is still a problem.  He feels that he did not fully recover after his right knee surgery, which affects his right leg strength and balance, but also experiences intermittent swelling of the right leg (chronic and started prior to his surgery) and paresthesias.  Denies interval development of new neurological symptoms.  Came back from [place] recently.  Plans to go back in November.  EXAM:                                                    Physical Exam:   Vitals: /81 (BP Location: Right arm, Patient Position: Sitting, Cuff Size: Adult Large)   Pulse 71   SpO2 97%     General: pt is in NAD, cooperative.  Skin: normal turgor, moist mucous membranes, no lesions/rashes noticed.  HEENT: ATNC, white sclera, normal conjunctiva.  Respiratory: Symmetric lung excursion, no accessory respiratory muscle  use.  Abdomen: Non distended.  Neurological: awake, cooperative, follows commands, no aphasia or dysarthria noted, cranial nerves II-XII: no ptosis, extraocular motility is full, face is symmetric, tongue is midline, equally moves all extremities, normal tone in upper and lower extremities, sensation to the light touch is intact bilaterally, pronator drift is negative, finger to nose intact bilaterally, casual gait is normal.  ASSESSMENT AND PLAN:                                                    Assessment: 73 year old male patient presents for follow-up of essential tremor and right leg paresthesia.  He feels that his tremor is stable.  We discussed that the dose of primidone could be increased in the future, but decided not to make any medication changes because currently tremor does not affect his activities of daily living.  Will revisit dose increase before he goes back to [place].    Regarding his right leg paresthesia, previously we discussed EMG to clarify his diagnosis.  The patient is open to this idea, and I placed the order.  The differential includes peroneal neuropathy versus lumbar sacral radiculopathy.    Diagnoses:    ICD-10-CM    1. Essential tremor  G25.0       2. Right leg paresthesias  R20.2         Plan: At today's visit we thoroughly discussed current symptoms, available treatment options, and the plan.    We decided not to make changes to primidone dose.  I refilled the prescription.    Regarding right leg numbness and tingling, we decided to do EMG.    Next follow-up appointment is in the next 8-12 weeks or earlier if needed.    Total Time: 21 minutes spent on the date of the encounter doing chart review, history and exam, documentation and further activities per the note.    Guillermo Rivera MD  [facility]rology  (Chart documentation was completed in part with Dragon voice-recognition software. Even though reviewed, some grammatical, spelling, and word errors may  "remain.)    Bill Smith is a 73 year old male who presents for:  Chief Complaint   Patient presents with     Follow Up     Tremor- stable some good days some days   Paresthesia- right leg still has balance and strength concerns         Initial Vitals:  /81 (BP Location: Right arm, Patient Position: Sitting, Cuff Size: Adult Large)   Pulse 71   SpO2 97%  Estimated body mass index is 31.19 kg/m  as calculated from the following:    Height as of 10/6/23: 1.861 m (6' 1.25\").    Weight as of 10/6/23: 108 kg (238 lb).. There is no height or weight on file to calculate BSA. BP completed using cuff size: large    Darling Garrido       Again, thank you for allowing me to participate in the care of your patient.        Sincerely,        Guillermo Rivera MD  "

## 2024-06-23 ENCOUNTER — HEALTH MAINTENANCE LETTER (OUTPATIENT)
Age: 74
End: 2024-06-23

## 2024-07-16 ENCOUNTER — TRANSFERRED RECORDS (OUTPATIENT)
Dept: HEALTH INFORMATION MANAGEMENT | Facility: CLINIC | Age: 74
End: 2024-07-16
Payer: MEDICARE

## 2024-07-16 LAB — RETINOPATHY: NEGATIVE

## 2024-08-07 ENCOUNTER — LAB (OUTPATIENT)
Dept: LAB | Facility: CLINIC | Age: 74
End: 2024-08-07
Payer: MEDICARE

## 2024-08-07 DIAGNOSIS — E11.9 DIABETES MELLITUS, TYPE 2 (H): Primary | ICD-10-CM

## 2024-08-07 LAB — HBA1C MFR BLD: 7 % (ref 0–5.6)

## 2024-08-07 PROCEDURE — 36415 COLL VENOUS BLD VENIPUNCTURE: CPT

## 2024-08-07 PROCEDURE — 83036 HEMOGLOBIN GLYCOSYLATED A1C: CPT

## 2024-08-12 DIAGNOSIS — Z96.651 S/P TOTAL KNEE ARTHROPLASTY, RIGHT: Primary | ICD-10-CM

## 2024-08-29 ASSESSMENT — KOOS JR
TWISING OR PIVOTING ON KNEE: MODERATE
RISING FROM SITTING: MODERATE
GOING UP OR DOWN STAIRS: SEVERE
HOW SEVERE IS YOUR KNEE STIFFNESS AFTER FIRST WAKING IN MORNING: MODERATE
STANDING UPRIGHT: MILD
BENDING TO THE FLOOR TO PICK UP OBJECT: MODERATE
KOOS JR SCORING: 57.14

## 2024-09-03 ENCOUNTER — ANCILLARY PROCEDURE (OUTPATIENT)
Dept: GENERAL RADIOLOGY | Facility: CLINIC | Age: 74
End: 2024-09-03
Attending: ORTHOPAEDIC SURGERY
Payer: MEDICARE

## 2024-09-03 ENCOUNTER — OFFICE VISIT (OUTPATIENT)
Dept: ORTHOPEDICS | Facility: CLINIC | Age: 74
End: 2024-09-03
Payer: MEDICARE

## 2024-09-03 DIAGNOSIS — G89.29 CHRONIC PAIN OF LEFT KNEE: ICD-10-CM

## 2024-09-03 DIAGNOSIS — Z96.651 S/P REVISION OF TOTAL KNEE, RIGHT: ICD-10-CM

## 2024-09-03 DIAGNOSIS — Z96.651 S/P TOTAL KNEE ARTHROPLASTY, RIGHT: ICD-10-CM

## 2024-09-03 DIAGNOSIS — G89.29 CHRONIC PAIN OF LEFT KNEE: Primary | ICD-10-CM

## 2024-09-03 DIAGNOSIS — M25.562 CHRONIC PAIN OF LEFT KNEE: Primary | ICD-10-CM

## 2024-09-03 DIAGNOSIS — M25.562 CHRONIC PAIN OF LEFT KNEE: ICD-10-CM

## 2024-09-03 PROCEDURE — 99213 OFFICE O/P EST LOW 20 MIN: CPT | Mod: GC | Performed by: ORTHOPAEDIC SURGERY

## 2024-09-03 PROCEDURE — 20610 DRAIN/INJ JOINT/BURSA W/O US: CPT | Mod: LT | Performed by: ORTHOPAEDIC SURGERY

## 2024-09-03 PROCEDURE — 73562 X-RAY EXAM OF KNEE 3: CPT | Mod: LT | Performed by: RADIOLOGY

## 2024-09-03 PROCEDURE — 73562 X-RAY EXAM OF KNEE 3: CPT | Mod: RT | Performed by: RADIOLOGY

## 2024-09-03 RX ORDER — TRIAMCINOLONE ACETONIDE 40 MG/ML
40 INJECTION, SUSPENSION INTRA-ARTICULAR; INTRAMUSCULAR
Status: SHIPPED | OUTPATIENT
Start: 2024-09-03

## 2024-09-03 RX ADMIN — TRIAMCINOLONE ACETONIDE 40 MG: 40 INJECTION, SUSPENSION INTRA-ARTICULAR; INTRAMUSCULAR at 14:59

## 2024-09-03 NOTE — NURSING NOTE
[facility]   ORTHOPEDICS & SPORTS MEDICINE  [address] [place] [address]  Dept: [phone]  ______________________________________________________________________________    Patient: Bill Smith   : 1950   MRN: [id number]   September 3, 2024    INVASIVE PROCEDURE SAFETY CHECKLIST    Date: 9/3/24   Procedure: Left Knee Injection  Patient Name: Bill Smith  MRN: [id number]  YOB: 1950    Action: Complete sections as appropriate. Any discrepancy results in a HARD COPY until resolved.     PRE PROCEDURE:  Patient ID verified with 2 identifiers (name and  or MRN): Yes  Procedure and site verified with patient/designee (when able): Yes  Accurate consent documentation in medical record: Yes  H&P (or appropriate assessment) documented in medical record: Yes  H&P must be up to 20 days prior to procedure and updates within 24 hours of procedure as applicable: NA  Relevant diagnostic and radiology test results appropriately labeled and displayed as applicable: NA  Procedure site(s) marked with provider initials: NA    TIMEOUT:  Time-Out performed immediately prior to starting procedure, including verbal and active participation of all team members addressing the following:Yes  * Correct patient identify  * Confirmed that the correct side and site are marked  * An accurate procedure consent form  * Agreement on the procedure to be done  * Correct patient position  * Relevant images and results are properly labeled and appropriately displayed  * The need to administer antibiotics or fluids for irrigation purposes during the procedure as applicable   * Safety precautions based on patient history or medication use    DURING PROCEDURE: Verification of correct person, site, and procedures any time the responsibility for care of the patient is transferred to another member of the care team.       Prior to injection, verified patient identity using patient's name and date of  birth.  Due to injection administration, patient instructed to remain in clinic for 15 minutes  afterwards, and to report any adverse reaction to me immediately.    Joint injection was performed.      Drug Amount Wasted:  None.  Vial/Syringe: Single dose vial  Expiration Date:  04/2026      Herrera Winn, TERESA  September 3, 2024

## 2024-09-03 NOTE — PROGRESS NOTES
Assessment: This is a 73 year old 1 year s/p r total knee arthroplasty who presents with symptoms of weakness and instability. The patient has stable well seated component in good alignment without evidence of acute complication on XR. His physical exam demonstrates a stable TKA with good ROM. At this time, recommend re-engaging with formal physical therapy focused on quad strengthening. In regards to his left knee, he has moderate osteoarthritis. We performed a corticosteroid injection in the left knee in clinic today. He will monitor his symptoms and follow up as needed.    Plan:    -Physical therapy focused on quad strengthening   -Corticosteroid injection in left knee performed in clinic today    Chief Complaint: Surgical Followup (1yr s/p RTKA 8/11/23)      HPI: Bill Smith is a 73 year old male who presents today for 1 year follow up of right total knee arthroplasty performed 8/11/23. The patient reports weakness and instability in his right knee that has been present since the time of surgery. He reports intermittent locking and buckling in his knee. He has been intermittently been using a cane and braces with minimal relief. He reports that he has not done formal physical therapy since last September and has sporadically been doing at home exercises.     Symptom Profile  Location of symptoms:  right knee  Onset: 1 yr ago  Trend: stable  Duration of symptoms: 1 yr  Quality of symptoms: weakness and instability  Severity: 4/10  Alleviate: activity modification  Exacerbating: activities  Previous Treatments: Previous treatments include activity modification, oral pain medication    KOOS Jr 57.14  PROMIS Mental: (P) 16  PROMIS Physical (P) 13  PROMIS TOTAL (P) 29  [facility]: 3    MEDICAL HISTORY:   Past Medical History:   Diagnosis Date    Acute medial meniscus tear of right knee     Arthritis     Chronic infection     Diabetes mellitus, type 2 (H) 07/25/2023    Essential tremor     hands    Gout     History of  hernia repair     HTN     Idiopathic chronic gout without tophus, unspecified site 12/05/2018    Parathyroid adenoma 2007    parathyroidectomy 04/2007    Paresthesia of right leg     Partial arterial retinal occlusion     right eye    Sleep apnea     no CPAP       Medications:     Current Outpatient Medications:     acetaminophen (TYLENOL) 325 MG tablet, Take 3 tablets (975 mg) by mouth every 8 hours, Disp: 100 tablet, Rfl: 0    allopurinol (ZYLOPRIM) 100 MG tablet, TAKE 1 TABLET BY MOUTH TWICE DAILY FOR  GOUT, Disp: 180 tablet, Rfl: 3    ascorbic acid (VITAMIN C) 1000 MG TABS, Take 1,000 mg by mouth daily, Disp: , Rfl:     bevacizumab (AVASTIN) 25 mg/mL intravitreal inj, by Intravitreal route once Every 7 weeks, Disp: , Rfl:     carvedilol (COREG) 25 MG tablet, TAKE 1 TABLET BY MOUTH TWICE DAILY WITH MEALS FOR BLOOD PRESSURE, Disp: 180 tablet, Rfl: 3    chlorthalidone (HYGROTON) 25 MG tablet, Take 1 tablet (25 mg) by mouth every morning For blood pressure., Disp: 90 tablet, Rfl: 3    fluticasone (FLONASE) 50 MCG/ACT nasal spray, Spray 1 spray into both nostrils daily as needed for rhinitis or allergies, Disp: , Rfl:     hydrALAZINE (APRESOLINE) 50 MG tablet, Take 1 tablet (50 mg) by mouth 2 times daily, Disp: 180 tablet, Rfl: 3    indomethacin (INDOCIN) 50 MG capsule, Take 1 capsule (50 mg) by mouth 3 times daily as needed for moderate pain, Disp: 60 capsule, Rfl: 3    losartan (COZAAR) 100 MG tablet, Take 1 tablet by mouth once daily for blood pressure, Disp: 90 tablet, Rfl: 3    metFORMIN (GLUCOPHAGE) 500 MG tablet, TAKE 1 TABLET BY MOUTH TWICE DAILY WITH MEALS FOR DIABETES, Disp: 180 tablet, Rfl: 3    MULTIPLE VITAMIN PO, Take 1 tablet by mouth daily Stopping 8/4/23 before surgery, Disp: , Rfl:     ondansetron (ZOFRAN ODT) 4 MG ODT tab, Take 1 tablet (4 mg) by mouth every 6 hours as needed for nausea or vomiting (Patient not taking: Reported on 11/13/2023), Disp: 8 tablet, Rfl: 0    polyethylene glycol (MIRALAX)  17 g packet, Take 17 g by mouth daily (Patient not taking: Reported on 11/13/2023), Disp: 7 packet, Rfl: 0    primidone (MYSOLINE) 50 MG tablet, Take 50 mg in the morning and 100 mg in the evening., Disp: 270 tablet, Rfl: 1    senna-docusate (SENOKOT-S/PERICOLACE) 8.6-50 MG tablet, Take 1 tablet by mouth 2 times daily (Patient not taking: Reported on 11/13/2023), Disp: 20 tablet, Rfl: 0    traMADol (ULTRAM) 50 MG tablet, Take 1 tablet (50 mg) by mouth every 6 hours as needed for severe pain (Patient not taking: Reported on 11/13/2023), Disp: 15 tablet, Rfl: 0    Allergies: Berm[place] grass extract, Cats, Grass pollen([id number] gisel), Lisinopril, Molds & smuts, and Shellfish allergy    SURGICAL HISTORY:   Past Surgical History:   Procedure Laterality Date    ABDOMEN SURGERY      Hernia repair left and right side    ARTHROPLASTY KNEE Right 8/11/2023    Procedure: ARTHROPLASTY, KNEE, TOTAL RIGHT;  Surgeon: Miky Jarvis MD;  Location: [facility] Main OR    ARTHROSCOPY KNEE WITH MEDIAL MENISCECTOMY Right 06/30/2021    Procedure: ARTHROSCOPY, RIGHT KNEE, WITH MEDIAL MENISCECTOMY, MAJOR CHONDROPLASTY.;  Surgeon: Jens Riley MD;  Location:  OR    COLONOSCOPY      HERNIA REPAIR      LAMINECT/DISCECTOMY, CERVICAL  01/01/1999    PARATHYROIDECTOMY  2007    partial    ZZC APPENDECTOMY         FAMILY HISTORY:   Family History   Problem Relation Age of Onset    Prostate Cancer Father     Alzheimer Disease Paternal Grandmother     Hypertension Maternal Uncle         AGE 60'S       SOCIAL HISTORY:   Social History     Tobacco Use    Smoking status: Never     Passive exposure: Never    Smokeless tobacco: Never   Substance Use Topics    Alcohol use: Not Currently     Comment: rarely       REVIEW OF SYSTEMS:  The comprehensive review of systems from the intake form was reviewed with the patient.  No fever, weight change or fatigue. No dry eyes. No oral ulcers, sore throat or voice change. No palpitations, syncope,  angina or edema.  No chest pain, excessive sleepiness, shortness of breath or hemoptysis.   No abdominal pain, nausea, vomiting, diarrhea or heartburn.  No skin rash. No focal weakness or numbness. No bleeding or lymphadenopathy. No rhinitis or hives.     Exam:  On physical examination the patient appears the stated age, is in no acute distress, affect is appropriate, and breathing is non-labored.  Vitals are documented in the EMR and have been reviewed:    There were no vitals taken for this visit.  Data Unavailable  There is no height or weight on file to calculate BMI.    Rises from chair: with some discomfort   Gait: antalgic, broad based gait  Gains the exam table: with some discomfort    Right  Knee  Appearance: benign, well healed incision   Clinical alignment: neutral  Effusion: none  Tenderness to palpation:  Extension: 0  Flexion: 110  Collateral ligaments: intact  Cruciate ligaments: grossly intact     Left Knee  Appearance: benign  Clinical alignment:  Effusion: none  Tenderness to palpation: medial/lateral joint line, pes tenderness  Extension: 0  Flexion:  115  Collateral ligaments: intact  Cruciate ligaments: grossly intact     Hip examination: benign hip ROM with groin or anterior thigh symptoms.     Distally, the circulatory, motor, and sensation exam is intact with 5/5 EHL, gastroc-soleus, and tibialis anterior.    Sensation to light touch is intact.    Dorsalis pedis and posterior tibialis pulses are palpable.    There are no sores on the feet, no bruising, and no lymphedema.    X-rays:   XR of the right knee 9/3/24 demonstrate stable well seated component in good alignment without evidence of acute complication.     XR left knee 9/3/24 reveals moderate medial compartment arthritis.       Large Joint Injection/Arthocentesis: L knee joint    Date/Time: 9/3/2024 2:59 PM    Performed by: Miky Jarvis MD  Authorized by: Miky Jarvis MD    Indications:  Osteoarthritis  Needle Size:  22  G  Guidance: landmark guided    Approach:  Superior  Location:  Knee      Medications:  40 mg triamcinolone 40 MG/ML  Outcome:  Tolerated well, no immediate complications  Procedure discussed: discussed risks, benefits, and alternatives    Consent Given by:  Patient  Timeout: timeout called immediately prior to procedure    Prep: patient was prepped and draped in usual sterile fashion

## 2024-09-03 NOTE — LETTER
9/3/2024      Bill Smith  [address]  [place] [address]      Dear Colleague,    Thank you for referring your patient, Bill Smith, to the [facility]. Please see a copy of my visit note below.    Assessment: This is a 73 year old 1 year s/p r total knee arthroplasty who presents with symptoms of weakness and instability. The patient has stable well seated component in good alignment without evidence of acute complication on XR. His physical exam demonstrates a stable TKA with good ROM. At this time, recommend re-engaging with formal physical therapy focused on quad strengthening. In regards to his left knee, he has moderate osteoarthritis. We performed a corticosteroid injection in the left knee in clinic today. He will monitor his symptoms and follow up as needed.    Plan:    -Physical therapy focused on quad strengthening   -Corticosteroid injection in left knee performed in clinic today    Chief Complaint: Surgical Followup (1yr s/p RTKA 8/11/23)      HPI: Bill Smith is a 73 year old male who presents today for 1 year follow up of right total knee arthroplasty performed 8/11/23. The patient reports weakness and instability in his right knee that has been present since the time of surgery. He reports intermittent locking and buckling in his knee. He has been intermittently been using a cane and braces with minimal relief. He reports that he has not done formal physical therapy since last September and has sporadically been doing at home exercises.     Symptom Profile  Location of symptoms:  right knee  Onset: 1 yr ago  Trend: stable  Duration of symptoms: 1 yr  Quality of symptoms: weakness and instability  Severity: 4/10  Alleviate: activity modification  Exacerbating: activities  Previous Treatments: Previous treatments include activity modification, oral pain medication    KOOS Jr 57.14  PROMIS Mental: (P) 16  PROMIS Physical (P) 13  PROMIS TOTAL (P)  29  UCLA: 3    MEDICAL HISTORY:   Past Medical History:   Diagnosis Date     Acute medial meniscus tear of right knee      Arthritis      Chronic infection      Diabetes mellitus, type 2 (H) 07/25/2023     Essential tremor     hands     Gout      History of hernia repair      HTN      Idiopathic chronic gout without tophus, unspecified site 12/05/2018     Parathyroid adenoma 2007    parathyroidectomy 04/2007     Paresthesia of right leg      Partial arterial retinal occlusion     right eye     Sleep apnea     no CPAP       Medications:     Current Outpatient Medications:      acetaminophen (TYLENOL) 325 MG tablet, Take 3 tablets (975 mg) by mouth every 8 hours, Disp: 100 tablet, Rfl: 0     allopurinol (ZYLOPRIM) 100 MG tablet, TAKE 1 TABLET BY MOUTH TWICE DAILY FOR  GOUT, Disp: 180 tablet, Rfl: 3     ascorbic acid (VITAMIN C) 1000 MG TABS, Take 1,000 mg by mouth daily, Disp: , Rfl:      bevacizumab (AVASTIN) 25 mg/mL intravitreal inj, by Intravitreal route once Every 7 weeks, Disp: , Rfl:      carvedilol (COREG) 25 MG tablet, TAKE 1 TABLET BY MOUTH TWICE DAILY WITH MEALS FOR BLOOD PRESSURE, Disp: 180 tablet, Rfl: 3     chlorthalidone (HYGROTON) 25 MG tablet, Take 1 tablet (25 mg) by mouth every morning For blood pressure., Disp: 90 tablet, Rfl: 3     fluticasone (FLONASE) 50 MCG/ACT nasal spray, Spray 1 spray into both nostrils daily as needed for rhinitis or allergies, Disp: , Rfl:      hydrALAZINE (APRESOLINE) 50 MG tablet, Take 1 tablet (50 mg) by mouth 2 times daily, Disp: 180 tablet, Rfl: 3     indomethacin (INDOCIN) 50 MG capsule, Take 1 capsule (50 mg) by mouth 3 times daily as needed for moderate pain, Disp: 60 capsule, Rfl: 3     losartan (COZAAR) 100 MG tablet, Take 1 tablet by mouth once daily for blood pressure, Disp: 90 tablet, Rfl: 3     metFORMIN (GLUCOPHAGE) 500 MG tablet, TAKE 1 TABLET BY MOUTH TWICE DAILY WITH MEALS FOR DIABETES, Disp: 180 tablet, Rfl: 3     MULTIPLE VITAMIN PO, Take 1 tablet by  mouth daily Stopping 8/4/23 before surgery, Disp: , Rfl:      ondansetron (ZOFRAN ODT) 4 MG ODT tab, Take 1 tablet (4 mg) by mouth every 6 hours as needed for nausea or vomiting (Patient not taking: Reported on 11/13/2023), Disp: 8 tablet, Rfl: 0     polyethylene glycol (MIRALAX) 17 g packet, Take 17 g by mouth daily (Patient not taking: Reported on 11/13/2023), Disp: 7 packet, Rfl: 0     primidone (MYSOLINE) 50 MG tablet, Take 50 mg in the morning and 100 mg in the evening., Disp: 270 tablet, Rfl: 1     senna-docusate (SENOKOT-S/PERICOLACE) 8.6-50 MG tablet, Take 1 tablet by mouth 2 times daily (Patient not taking: Reported on 11/13/2023), Disp: 20 tablet, Rfl: 0     traMADol (ULTRAM) 50 MG tablet, Take 1 tablet (50 mg) by mouth every 6 hours as needed for severe pain (Patient not taking: Reported on 11/13/2023), Disp: 15 tablet, Rfl: 0    Allergies: Bermuda grass extract, Cats, Grass pollen([id number] gisel), Lisinopril, Molds & smuts, and Shellfish allergy    SURGICAL HISTORY:   Past Surgical History:   Procedure Laterality Date     ABDOMEN SURGERY      Hernia repair left and right side     ARTHROPLASTY KNEE Right 8/11/2023    Procedure: ARTHROPLASTY, KNEE, TOTAL RIGHT;  Surgeon: Miky Jarvis MD;  Location: [facility] Main OR     ARTHROSCOPY KNEE WITH MEDIAL MENISCECTOMY Right 06/30/2021    Procedure: ARTHROSCOPY, RIGHT KNEE, WITH MEDIAL MENISCECTOMY, MAJOR CHONDROPLASTY.;  Surgeon: Jens Riley MD;  Location:  OR     COLONOSCOPY       HERNIA REPAIR       LAMINECT/DISCECTOMY, CERVICAL  01/01/1999     PARATHYROIDECTOMY  2007    partial     ZZC APPENDECTOMY         FAMILY HISTORY:   Family History   Problem Relation Age of Onset     Prostate Cancer Father      Alzheimer Disease Paternal Grandmother      Hypertension Maternal Uncle         AGE 60'S       SOCIAL HISTORY:   Social History     Tobacco Use     Smoking status: Never     Passive exposure: Never     Smokeless tobacco: Never   Substance Use  Topics     Alcohol use: Not Currently     Comment: rarely       REVIEW OF SYSTEMS:  The comprehensive review of systems from the intake form was reviewed with the patient.  No fever, weight change or fatigue. No dry eyes. No oral ulcers, sore throat or voice change. No palpitations, syncope, angina or edema.  No chest pain, excessive sleepiness, shortness of breath or hemoptysis.   No abdominal pain, nausea, vomiting, diarrhea or heartburn.  No skin rash. No focal weakness or numbness. No bleeding or lymphadenopathy. No rhinitis or hives.     Exam:  On physical examination the patient appears the stated age, is in no acute distress, affect is appropriate, and breathing is non-labored.  Vitals are documented in the EMR and have been reviewed:    There were no vitals taken for this visit.  Data Unavailable  There is no height or weight on file to calculate BMI.    Rises from chair: with some discomfort   Gait: antalgic, broad based gait  Gains the exam table: with some discomfort    Right  Knee  Appearance: benign, well healed incision   Clinical alignment: neutral  Effusion: none  Tenderness to palpation:  Extension: 0  Flexion: 110  Collateral ligaments: intact  Cruciate ligaments: grossly intact     Left Knee  Appearance: benign  Clinical alignment:  Effusion: none  Tenderness to palpation: medial/lateral joint line, pes tenderness  Extension: 0  Flexion:  115  Collateral ligaments: intact  Cruciate ligaments: grossly intact     Hip examination: benign hip ROM with groin or anterior thigh symptoms.     Distally, the circulatory, motor, and sensation exam is intact with 5/5 EHL, gastroc-soleus, and tibialis anterior.    Sensation to light touch is intact.    Dorsalis pedis and posterior tibialis pulses are palpable.    There are no sores on the feet, no bruising, and no lymphedema.    X-rays:   XR of the right knee 9/3/24 demonstrate stable well seated component in good alignment without evidence of acute  complication.     XR left knee 9/3/24 reveals moderate medial compartment arthritis.       Large Joint Injection/Arthocentesis: L knee joint    Date/Time: 9/3/2024 2:59 PM    Performed by: Miky Jarvis MD  Authorized by: Miky Jarvis MD    Indications:  Osteoarthritis  Needle Size:  22 G  Guidance: landmark guided    Approach:  Superior  Location:  Knee      Medications:  40 mg triamcinolone 40 MG/ML  Outcome:  Tolerated well, no immediate complications  Procedure discussed: discussed risks, benefits, and alternatives    Consent Given by:  Patient  Timeout: timeout called immediately prior to procedure    Prep: patient was prepped and draped in usual sterile fashion          I have personally examined this patient and have reviewed the clinical presentation and progress note with the resident. I agree with the treatment plan as outlined. The plan was formulated with the resident on the day of the resident's dictation.  Procedure Note:    After informed consent was obtained the patient's left knee was injected with 5 cc of lidocaine and 40 mg of kenolog using a superior-lateral approach.  Sterile technique was used.  The patient tolerated the procedure well.       Again, thank you for allowing me to participate in the care of your patient.        Sincerely,        Miky Jarvis MD

## 2024-09-03 NOTE — PROGRESS NOTES
I have personally examined this patient and have reviewed the clinical presentation and progress note with the resident. I agree with the treatment plan as outlined. The plan was formulated with the resident on the day of the resident's dictation.  Procedure Note:    After informed consent was obtained the patient's left knee was injected with 5 cc of lidocaine and 40 mg of kenolog using a superior-lateral approach.  Sterile technique was used.  The patient tolerated the procedure well.

## 2024-09-03 NOTE — NURSING NOTE
Bill Smith's chief complaint for this visit includes:  Chief Complaint   Patient presents with    Surgical Followup     1yr s/p RTKA 8/11/23       Referring Provider:  Referred Self, MD  No address on file    There were no vitals taken for this visit.  Data Unavailable   Global Mental Health Score: (P) 16  Global Physical Health Score: (P) 13  PROMIS TOTAL - SUBSCORES: (P) 29  UCLA: 3  KOOS Jr.      57.14       Pain increases with: instability with load  Previous surgeries: RTKA 8/11/23  Previous injections within last 6 months: NO  Treatments done: HEP   Imaging completed: XR   Pain: 4/10  Concerns: Knee gets unstable under load.     Herrera Winn, ATC

## 2024-09-06 ENCOUNTER — PATIENT OUTREACH (OUTPATIENT)
Dept: CARE COORDINATION | Facility: CLINIC | Age: 74
End: 2024-09-06
Payer: MEDICARE

## 2024-09-07 ASSESSMENT — ACTIVITIES OF DAILY LIVING (ADL)
SIT WITH YOUR KNEE BENT: ACTIVITY IS SOMEWHAT DIFFICULT
KNEEL ON THE FRONT OF YOUR KNEE: ACTIVITY IS VERY DIFFICULT
WALK: ACTIVITY IS FAIRLY DIFFICULT
PAIN: THE SYMPTOM AFFECTS MY ACTIVITY MODERATELY
HOW_WOULD_YOU_RATE_THE_OVERALL_FUNCTION_OF_YOUR_KNEE_DURING_YOUR_USUAL_DAILY_ACTIVITIES?: ABNORMAL
PLEASE_INDICATE_YOR_PRIMARY_REASON_FOR_REFERRAL_TO_THERAPY:: KNEE
AS_A_RESULT_OF_YOUR_KNEE_INJURY,_HOW_WOULD_YOU_RATE_YOUR_CURRENT_LEVEL_OF_DAILY_ACTIVITY?: ABNORMAL
STIFFNESS: THE SYMPTOM AFFECTS MY ACTIVITY MODERATELY
STAND: ACTIVITY IS FAIRLY DIFFICULT
HOW_WOULD_YOU_RATE_THE_CURRENT_FUNCTION_OF_YOUR_KNEE_DURING_YOUR_USUAL_DAILY_ACTIVITIES_ON_A_SCALE_FROM_0_TO_100_WITH_100_BEING_YOUR_LEVEL_OF_KNEE_FUNCTION_PRIOR_TO_YOUR_INJURY_AND_0_BEING_THE_INABILITY_TO_PERFORM_ANY_OF_YOUR_USUAL_DAILY_ACTIVITIES?: 50
KNEE_ACTIVITY_OF_DAILY_LIVING_SCORE: 35.71
WEAKNESS: THE SYMPTOM AFFECTS MY ACTIVITY MODERATELY
SQUAT: ACTIVITY IS VERY DIFFICULT
RAW_SCORE: 25
LIMPING: THE SYMPTOM AFFECTS MY ACTIVITY MODERATELY
RISE FROM A CHAIR: ACTIVITY IS FAIRLY DIFFICULT
GO DOWN STAIRS: ACTIVITY IS VERY DIFFICULT
KNEE_ACTIVITY_OF_DAILY_LIVING_SUM: 25
GO UP STAIRS: ACTIVITY IS VERY DIFFICULT
GIVING WAY, BUCKLING OR SHIFTING OF KNEE: THE SYMPTOM AFFECTS MY ACTIVITY MODERATELY
SWELLING: THE SYMPTOM AFFECTS MY ACTIVITY MODERATELY

## 2024-09-10 DIAGNOSIS — I10 ESSENTIAL HYPERTENSION WITH GOAL BLOOD PRESSURE LESS THAN 140/90: ICD-10-CM

## 2024-09-10 RX ORDER — HYDRALAZINE HYDROCHLORIDE 50 MG/1
50 TABLET, FILM COATED ORAL 2 TIMES DAILY
Qty: 180 TABLET | Refills: 0 | Status: SHIPPED | OUTPATIENT
Start: 2024-09-10

## 2024-09-12 ENCOUNTER — THERAPY VISIT (OUTPATIENT)
Dept: PHYSICAL THERAPY | Facility: CLINIC | Age: 74
End: 2024-09-12
Attending: ORTHOPAEDIC SURGERY
Payer: MEDICARE

## 2024-09-12 DIAGNOSIS — M25.562 CHRONIC PAIN OF LEFT KNEE: ICD-10-CM

## 2024-09-12 DIAGNOSIS — G89.29 CHRONIC PAIN OF LEFT KNEE: ICD-10-CM

## 2024-09-12 DIAGNOSIS — M25.562 CHRONIC PAIN OF BOTH KNEES: Primary | ICD-10-CM

## 2024-09-12 DIAGNOSIS — Z96.651 S/P REVISION OF TOTAL KNEE, RIGHT: ICD-10-CM

## 2024-09-12 DIAGNOSIS — G89.29 CHRONIC PAIN OF BOTH KNEES: Primary | ICD-10-CM

## 2024-09-12 DIAGNOSIS — M25.561 CHRONIC PAIN OF BOTH KNEES: Primary | ICD-10-CM

## 2024-09-12 PROCEDURE — 97110 THERAPEUTIC EXERCISES: CPT | Mod: GP | Performed by: PHYSICAL THERAPIST

## 2024-09-12 PROCEDURE — 97161 PT EVAL LOW COMPLEX 20 MIN: CPT | Mod: GP | Performed by: PHYSICAL THERAPIST

## 2024-09-12 NOTE — PROGRESS NOTES
PHYSICAL THERAPY EVALUATION  Type of Visit: Evaluation              Subjective       Presenting condition or subjective complaint: Had his 1 year follow up for his total knee on right on 8/11/23 and now left knee is having some pain on left because of it.  Date of onset: 08/11/23    Relevant medical history:     Dates & types of surgery:      Prior diagnostic imaging/testing results: X-ray     Prior therapy history for the same diagnosis, illness or injury: Yes 9/2023    Prior Level of Function  Transfers:   Ambulation:   ADL:   IADL:     Living Environment  Social support: Alone   Type of home: House   Stairs to enter the home: Yes 3 Is there a railing: Yes     Ramp: No   Stairs inside the home: Yes 13 Is there a railing: Yes     Help at home: Self Cares (home health aide/personal care attendant, family, etc)  Equipment owned: Walker     Employment: No    Hobbies/Interests: watching TV.    Patient goals for therapy: Stairs reliev3 pain    Pain assessment: Pain present     Objective   KNEE EVALUATION  PAIN: Pain Level at Rest: 0/10  Pain Level with Use: 6/10  Pain Location: in the joint.   Pain Quality: Aching  Pain Frequency: intermittent  Pain is Exacerbated By: stairs, initial walking, prolonged walking, prolonged standing  Pain is Relieved By: old PT exercises  Pain Progression: Unchanged  INTEGUMENTARY (edema, incisions):   POSTURE:   GAIT:  Weightbearing Status:   Assistive Device(s): Cane (single end)  Gait Deviations: Stride length decreased  BALANCE/PROPRIOCEPTION:   WEIGHTBEARING ALIGNMENT:   NON-WEIGHTBEARING ALIGNMENT:   ROM:   (Degrees) Left AROM Left PROM  Right AROM Right PROM   Knee Flexion 145  128    Knee Extension 0  Lacking 4 deg     Pain:   End feel:     STRENGTH: hip ER 5/5 bilat, hip abd 5/5, hip extension 3+/5 right, 5/5 left  Pain: - none + mild ++ moderate +++ severe  Strength Scale: 0-5/5 Left Right   Knee Flexion 5 4   Knee Extension 5 4   Quad Set       FLEXIBILITY:   SPECIAL TESTS:    FUNCTIONAL TESTS:  squat: unable to rise out of a chair without use of UE, stairs: unable to perform steps with right knee.   PALPATION:   JOINT MOBILITY: WNL    Assessment & Plan   CLINICAL IMPRESSIONS  Medical Diagnosis: Chronic pain of left knee  S/P revision of total knee, right    Treatment Diagnosis: bilateral knee pain , weak LE   Impression/Assessment: Patient is a 73 year old male with bilateral knee pain / weakness complaints.  The following significant findings have been identified: Pain, Decreased ROM/flexibility, Decreased strength, and Decreased activity tolerance. These impairments interfere with their ability to perform self care tasks, recreational activities, and household chores as compared to previous level of function.     Clinical Decision Making (Complexity):  Clinical Presentation: Stable/Uncomplicated  Clinical Presentation Rationale: based on medical and personal factors listed in PT evaluation  Clinical Decision Making (Complexity): Low complexity    PLAN OF CARE  Treatment Interventions:  Interventions: Manual Therapy, Neuromuscular Re-education, Therapeutic Activity, Therapeutic Exercise, Self-Care/Home Management    Long Term Goals     PT Goal 1  Goal Identifier: transfer  Goal Description: pt will be able to get in and out of a chair / toilet without use of UE.  Rationale: to maximize safety and independence with performance of ADLs and functional tasks  Goal Progress: pt unable to rise out of chair without signficant use of bilateral UE.  Target Date: 11/07/24      Frequency of Treatment: 1 x/ week  Duration of Treatment: 8 weeks    Recommended Referrals to Other Professionals:   Education Assessment:   Learner/Method: Patient;Reading;Demonstration;Pictures/Video;No Barriers to Learning    Risks and benefits of evaluation/treatment have been explained.   Patient/Family/caregiver agrees with Plan of Care.     Evaluation Time:     PT Eval, Low Complexity Minutes (96679):  14   Present: Not applicable     Signing Clinician: ELBERT Fragoso [facility]                                                                                   OUTPATIENT PHYSICAL THERAPY      PLAN OF TREATMENT FOR OUTPATIENT REHABILITATION   Patient's Last Name, First Name, Bill Perez YOB: 1950   Provider's Name   [facility]   Medical Record No.  [id number]     Onset Date: 08/11/23  Start of Care Date: 09/12/24     Medical Diagnosis:  Chronic pain of left knee  S/P revision of total knee, right      PT Treatment Diagnosis:  bilateral knee pain , weak LE Plan of Treatment  Frequency/Duration: 1 x/ week/ 8 weeks    Certification date from 09/12/24 to 11/07/24         See note for plan of treatment details and functional goals     Wallace Bradley PT                         I CERTIFY THE NEED FOR THESE SERVICES FURNISHED UNDER        THIS PLAN OF TREATMENT AND WHILE UNDER MY CARE     (Physician attestation of this document indicates review and certification of the therapy plan).              Referring Provider:  Miky Jarvis    Initial Assessment  See Epic Evaluation- Start of Care Date: 09/12/24

## 2024-09-18 ENCOUNTER — THERAPY VISIT (OUTPATIENT)
Dept: PHYSICAL THERAPY | Facility: CLINIC | Age: 74
End: 2024-09-18
Payer: MEDICARE

## 2024-09-18 DIAGNOSIS — G89.29 CHRONIC PAIN OF BOTH KNEES: Primary | ICD-10-CM

## 2024-09-18 DIAGNOSIS — M25.562 CHRONIC PAIN OF BOTH KNEES: Primary | ICD-10-CM

## 2024-09-18 DIAGNOSIS — M25.561 CHRONIC PAIN OF BOTH KNEES: Primary | ICD-10-CM

## 2024-09-18 PROCEDURE — 97110 THERAPEUTIC EXERCISES: CPT | Mod: GP | Performed by: PHYSICAL THERAPY ASSISTANT

## 2024-09-20 ENCOUNTER — PATIENT OUTREACH (OUTPATIENT)
Dept: CARE COORDINATION | Facility: CLINIC | Age: 74
End: 2024-09-20
Payer: MEDICARE

## 2024-09-25 ENCOUNTER — THERAPY VISIT (OUTPATIENT)
Dept: PHYSICAL THERAPY | Facility: CLINIC | Age: 74
End: 2024-09-25
Payer: MEDICARE

## 2024-09-25 DIAGNOSIS — M25.562 CHRONIC PAIN OF BOTH KNEES: Primary | ICD-10-CM

## 2024-09-25 DIAGNOSIS — M25.561 CHRONIC PAIN OF BOTH KNEES: Primary | ICD-10-CM

## 2024-09-25 DIAGNOSIS — G89.29 CHRONIC PAIN OF BOTH KNEES: Primary | ICD-10-CM

## 2024-09-25 PROCEDURE — 97110 THERAPEUTIC EXERCISES: CPT | Mod: GP | Performed by: PHYSICAL THERAPY ASSISTANT

## 2024-10-02 ENCOUNTER — THERAPY VISIT (OUTPATIENT)
Dept: PHYSICAL THERAPY | Facility: CLINIC | Age: 74
End: 2024-10-02
Payer: MEDICARE

## 2024-10-02 DIAGNOSIS — M25.562 CHRONIC PAIN OF BOTH KNEES: Primary | ICD-10-CM

## 2024-10-02 DIAGNOSIS — G89.29 CHRONIC PAIN OF BOTH KNEES: Primary | ICD-10-CM

## 2024-10-02 DIAGNOSIS — M25.561 CHRONIC PAIN OF BOTH KNEES: Primary | ICD-10-CM

## 2024-10-02 PROCEDURE — 97110 THERAPEUTIC EXERCISES: CPT | Mod: GP | Performed by: PHYSICAL THERAPIST

## 2024-10-03 ENCOUNTER — OFFICE VISIT (OUTPATIENT)
Dept: NEUROLOGY | Facility: CLINIC | Age: 74
End: 2024-10-03
Attending: PSYCHIATRY & NEUROLOGY
Payer: MEDICARE

## 2024-10-03 DIAGNOSIS — R20.2 RIGHT LEG PARESTHESIAS: Primary | ICD-10-CM

## 2024-10-03 PROCEDURE — 95886 MUSC TEST DONE W/N TEST COMP: CPT | Performed by: INTERNAL MEDICINE

## 2024-10-03 PROCEDURE — 95913 NRV CNDJ TEST 13/> STUDIES: CPT | Performed by: INTERNAL MEDICINE

## 2024-10-03 NOTE — PROGRESS NOTES
[facility]  Electrodiagnostic Laboratory                 Department of Neurology                                                                                                         Test Date:  10/3/2024    Patient: Bill Smith : 1950 Physician: Dat Sharma MD   Sex: Male AGE: 73 year Ref Phys:    ID#: [id number]   Technician: Markell Rocha     History and Examination:  Patient is a 72 yo male who presents for evaluation of intermittent paresthesias in the lower extremities and upper extremity tremors. EMG ordered to evaluate for neuropathy or radiculopathy.     Techniques:  Motor conduction studies were done with surface recording electrodes. Sensory conduction studies were performed with surface electrodes, unless indicated otherwise by (n), designating the use of subdermal recording electrodes. Temperature was monitored and recorded throughout the study. Upper extremities were maintained at a temperature of 32 degrees Centigrade or higher.  EMG was done with a concentric needle electrode.     Results:  All F Wave latencies were within normal limits.      Evaluation of the bilateral sural and radial sensory nerves were absent. The left median and ulnar sensory studies showed decreased amplitude. The bilateral peroneal-EDB and tibial-AH motor responses were absent. The left median-APB motor response showed mild decrease in conduction velocity. The left ulnar motor response showed focal slowing of conduction velocity across the elbow segment.     Needle EMG of the right tibialis anterior and gastrocnemius showed chronic partial reinnervation changes. Mild abnormal spontaneous activity was seen in the right gastrocnemius.       Interpretation:  This is an abnormal examination. 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 Amp Neg. Amp Diff Segment Distance Velocity Neg. Dur Neg Area Diff Temperature Comment   Site (ms) Norm (mV) Norm (%)  cm m/s Norm (ms) (%) ( C)    Left Dp Branch Fibular (TA) Motor   Fibular Head 4.0  < 6.0 5.0 -      17.2  31.3    Right Dp Branch Fibular (TA) Motor   Fibular Head 3.7  < 6.0 5.4 -      17.2  30.3    Pop Fossa 5.5  < 5.7 5.0 - -7 Pop Fossa-Fibular Head 8.5 47 - 8.4 -30 30.4    Left Fibular (EDB) Motor   Ankle NR  < 6.0 NR -  Ankle-EDB 8   NR  31.3    Right Fibular (EDB) Motor   Ankle 1.48  < 6.0 - -  Ankle-EDB 8   -  30    Bel Fibular Head NR - NR - NR Bel Fibular Head-Ankle - NR  > 38 NR NR 30.2    Left Median (APB) Motor   Wrist 3.9  < 4.4 6.5  > 5.0  Wrist-APB 8   7.4  32.3    Elbow 9.6 - 5.8  > 5.0 -11 Elbow-Wrist 25.5 45  > 48 7.6 -5 32.3    Left Tibial (AHB) Motor   Ankle NR  < 6.5 NR  > 5.0  Ankle-AH 8   NR  30.9    Right Tibial (AHB) Motor   Ankle 1.95  < 6.5 NR  > 5.0  Ankle-AH 8   -  30.7    Left Ulnar (ADM) Motor   Wrist 3.4  < 3.5 6.9  > 5.0  Wrist-ADM 8   5.7  32.2    Below Elbow 7.7 - 5.4 - -22 Below Elbow-Wrist 23.5 55  > 48 6.3 -15 32.2    Above Elbow 9.9 - 4.9 - -9 Above Elbow-Below Elbow 9 41  > 48 6.7 -7 32.2      F-Wave Sites      Min F-Lat Max-Min F-Lat Mean F-Lat   Site (ms) (ms) (ms)   Left Ulnar F-Wave   Wrist 34.8 6.1 38.0     Sensory Sites      Onset Lat Peak Lat Amp (O-P) Amp (P-P) Segment Distance Velocity Temperature Comment   Site ms (ms)  V Norm ( V)  cm m/s Norm ( C)    Left Median Sensory   Wrist-Dig II 3.5 4.3 3  > 10 5 Wrist-Dig II 14 40  > 48 32    Left Radial Sensory   Forearm-Wrist NR NR NR  > 15 NR Forearm-Wrist 10 NR - 32.3    Right Radial Sensory   Forearm-Wrist NR NR NR  > 15 NR Forearm-Wrist 10 NR - 23.2    Left Sural Sensory   Calf-Lat Malleolus NR NR NR  > 5 NR Calf-Lat Malleolus 14 NR  > 38 31.3    Right Sural Sensory   Calf-Lat Malleolus  NR NR NR  > 5 NR Calf-Lat Malleolus 14 NR  > 38 31    Left Ulnar Sensory   Wrist-Dig V 2.9 3.8 4  > 8 1 Wrist-Dig V 12.5 43  > 48 31.8        Electromyography     Side Muscle Ins Act Fibs/PSW Fasc HF Amp Dur Poly Recrt Int Pat   Right Tib ant Nml None Nml 0 2+ 2+ 0 Sheri Nml   Right Gastroc Nml 1+ Nml 0 2+ 2+ 0 Sheri Nml   Right Gluteus med Nml None Nml 0 Nml Nml 0 Nml Nml   Right Gluteus max Nml None Nml 0 Nml Nml 0 Nml Nml   Right Vastus med Nml None Nml 0 Nml Nml 0 Nml Nml         NCS Waveforms:    Motor                           Sensory                    F-Wave

## 2024-10-03 NOTE — LETTER
10/3/2024      Bill Smith  [address] [address]      Dear Colleague,    Thank you for referring your patient, Bill Smith, to the [facility] NEUROLOGY CLINIC [place]. Please see a copy of my visit note below.                        [facility]  Electrodiagnostic Laboratory                 Department of Neurology                                                                                                         Test Date:  10/3/2024    Patient: Bill Smith : 1950 Physician: Dat Sharma MD   Sex: Male AGE: 73 year Ref Phys:    ID#: [id number]   Technician: Markell Rocha     History and Examination:  Patient is a 72 yo male who presents for evaluation of intermittent paresthesias in the lower extremities and upper extremity tremors. EMG ordered to evaluate for neuropathy or radiculopathy.     Techniques:  Motor conduction studies were done with surface recording electrodes. Sensory conduction studies were performed with surface electrodes, unless indicated otherwise by (n), designating the use of subdermal recording electrodes. Temperature was monitored and recorded throughout the study. Upper extremities were maintained at a temperature of 32 degrees Centigrade or higher.  EMG was done with a concentric needle electrode.     Results:  All F Wave latencies were within normal limits.      Evaluation of the bilateral sural and radial sensory nerves were absent. The left median and ulnar sensory studies showed decreased amplitude. The bilateral peroneal-EDB and tibial-AH motor responses were absent. The left median-APB motor response showed mild decrease in conduction velocity. The left ulnar motor response showed focal slowing of conduction velocity across the elbow segment.     Needle EMG of the right tibialis anterior and gastrocnemius showed chronic partial reinnervation changes. Mild abnormal spontaneous activity was seen in the right  gastrocnemius.       Interpretation:  This is an abnormal examination. 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 Amp Neg. Amp Diff Segment Distance Velocity Neg. Dur Neg Area Diff Temperature Comment   Site (ms) Norm (mV) Norm (%)  cm m/s Norm (ms) (%) ( C)    Left Dp Branch Fibular (TA) Motor   Fibular Head 4.0  < 6.0 5.0 -      17.2  31.3    Right Dp Branch Fibular (TA) Motor   Fibular Head 3.7  < 6.0 5.4 -      17.2  30.3    Pop Fossa 5.5  < 5.7 5.0 - -7 Pop Fossa-Fibular Head 8.5 47 - 8.4 -30 30.4    Left Fibular (EDB) Motor   Ankle NR  < 6.0 NR -  Ankle-EDB 8   NR  31.3    Right Fibular (EDB) Motor   Ankle 1.48  < 6.0 - -  Ankle-EDB 8   -  30    Bel Fibular Head NR - NR - NR Bel Fibular Head-Ankle - NR  > 38 NR NR 30.2    Left Median (APB) Motor   Wrist 3.9  < 4.4 6.5  > 5.0  Wrist-APB 8   7.4  32.3    Elbow 9.6 - 5.8  > 5.0 -11 Elbow-Wrist 25.5 45  > 48 7.6 -5 32.3    Left Tibial (AHB) Motor   Ankle NR  < 6.5 NR  > 5.0  Ankle-AH 8   NR  30.9    Right Tibial (AHB) Motor   Ankle 1.95  < 6.5 NR  > 5.0  Ankle-AH 8   -  30.7    Left Ulnar (ADM) Motor   Wrist 3.4  < 3.5 6.9  > 5.0  Wrist-ADM 8   5.7  32.2    Below Elbow 7.7 - 5.4 - -22 Below Elbow-Wrist 23.5 55  > 48 6.3 -15 32.2    Above Elbow 9.9 - 4.9 - -9 Above Elbow-Below Elbow 9 41  > 48 6.7 -7 32.2      F-Wave Sites      Min F-Lat Max-Min F-Lat Mean F-Lat   Site (ms) (ms) (ms)   Left Ulnar F-Wave   Wrist 34.8 6.1 38.0     Sensory Sites      Onset Lat Peak Lat Amp (O-P) Amp (P-P) Segment Distance Velocity Temperature Comment   Site ms (ms)  V Norm ( V)  cm m/s Norm ( C)    Left Median Sensory   Wrist-Dig II 3.5 4.3 3  > 10 5 Wrist-Dig II 14 40  > 48 32    Left Radial Sensory   Forearm-Wrist NR NR NR  >  15 NR Forearm-Wrist 10 NR - 32.3    Right Radial Sensory   Forearm-Wrist NR NR NR  > 15 NR Forearm-Wrist 10 NR - 23.2    Left Sural Sensory   Calf-Lat Malleolus NR NR NR  > 5 NR Calf-Lat Malleolus 14 NR  > 38 31.3    Right Sural Sensory   Calf-Lat Malleolus NR NR NR  > 5 NR Calf-Lat Malleolus 14 NR  > 38 31    Left Ulnar Sensory   Wrist-Dig V 2.9 3.8 4  > 8 1 Wrist-Dig V 12.5 43  > 48 31.8        Electromyography     Side Muscle Ins Act Fibs/PSW Fasc HF Amp Dur Poly Recrt Int Pat   Right Tib ant Nml None Nml 0 2+ 2+ 0 Sheri Nml   Right Gastroc Nml 1+ Nml 0 2+ 2+ 0 Sheri Nml   Right Gluteus med Nml None Nml 0 Nml Nml 0 Nml Nml   Right Gluteus max Nml None Nml 0 Nml Nml 0 Nml Nml   Right Vastus med Nml None Nml 0 Nml Nml 0 Nml Nml         NCS Waveforms:    Motor                           Sensory                    F-Wave         Again, thank you for allowing me to participate in the care of your patient.        Sincerely,        Dat Sharma MD

## 2024-10-07 ENCOUNTER — OFFICE VISIT (OUTPATIENT)
Dept: NEUROLOGY | Facility: CLINIC | Age: 74
End: 2024-10-07
Payer: MEDICARE

## 2024-10-07 VITALS — DIASTOLIC BLOOD PRESSURE: 82 MMHG | SYSTOLIC BLOOD PRESSURE: 138 MMHG | OXYGEN SATURATION: 96 % | HEART RATE: 72 BPM

## 2024-10-07 DIAGNOSIS — G25.0 ESSENTIAL TREMOR: Primary | ICD-10-CM

## 2024-10-07 DIAGNOSIS — G56.22 ULNAR NEUROPATHY AT ELBOW, LEFT: ICD-10-CM

## 2024-10-07 DIAGNOSIS — G62.9 PERIPHERAL POLYNEUROPATHY: ICD-10-CM

## 2024-10-07 PROCEDURE — 99214 OFFICE O/P EST MOD 30 MIN: CPT | Performed by: PSYCHIATRY & NEUROLOGY

## 2024-10-07 PROCEDURE — G2211 COMPLEX E/M VISIT ADD ON: HCPCS | Performed by: PSYCHIATRY & NEUROLOGY

## 2024-10-07 RX ORDER — PRIMIDONE 50 MG/1
100 TABLET ORAL 2 TIMES DAILY
Qty: 360 TABLET | Refills: 2 | Status: SHIPPED | OUTPATIENT
Start: 2024-10-07

## 2024-10-07 NOTE — PROGRESS NOTES
"Bill Smith is a 73 year old male who presents for:  Chief Complaint   Patient presents with    Follow Up     Review EMG        Initial Vitals:  /82 (BP Location: Right arm, Patient Position: Sitting, Cuff Size: Adult Large)   Pulse 72   SpO2 96%  Estimated body mass index is 31.19 kg/m  as calculated from the following:    Height as of 10/6/23: 1.861 m (6' 1.25\").    Weight as of 10/6/23: 108 kg (238 lb).. There is no height or weight on file to calculate BSA. BP completed using cuff size: daniel Garrido   "

## 2024-10-07 NOTE — PROGRESS NOTES
ESTABLISHED PATIENT NEUROLOGY NOTE    DATE OF VISIT: 10/7/2024  CLINIC LOCATION: [facility]  MRN: [id number]  PATIENT NAME: Bill Smith  YOB: 1950    REASON FOR VISIT:   Chief Complaint   Patient presents with    Follow Up     Review EMG     SUBJECTIVE:                                                      HISTORY OF PRESENT ILLNESS: Patient is here to follow up regarding essential tremor and right leg paresthesia.  The last visit was on 5/28/2024.  Please refer to my initial/other prior notes for further information.    Since the last visit, the patient reports that he decided to increase primidone to 100 mg twice daily on his own approximately 3 to 4 weeks ago, but so far did not notice any tremor and improvement.  He denies any significant side effects.  No new focal neurological symptoms.    EMG from 10/3/2024 was abnormal demonstrating severe symmetric length dependent sensorimotor axonal polyneuropathy in addition to mild to moderate left-sided ulnar neuropathy at the elbow.  Tabulated data from EMG report were personally reviewed and independently interpreted.  EXAM:                                                    Physical Exam:   Vitals: /82 (BP Location: Right arm, Patient Position: Sitting, Cuff Size: Adult Large)   Pulse 72   SpO2 96%     General: pt is in NAD, cooperative.  Skin: normal turgor, moist mucous membranes, no lesions/rashes noticed.  HEENT: ATNC, white sclera, normal conjunctiva.  Respiratory: Symmetric lung excursion, no accessory respiratory muscle use.  Abdomen: Non distended.  Neurological: awake, cooperative, follows commands, no exam changes compared to the previous visits, continues to have mild right and moderate left positional and kinetic hand tremor, walks with a cane.  ASSESSMENT AND PLAN:                                                    Assessment: 73 year old male patient presents for follow-up of essential tremor and right  leg paresthesia.      Regarding his essential tremor, the patient increase primidone to 100 mg twice daily on his own, and so far did not notice any tremor improvement.  In my opinion, his tremor remains stable on current dose, and I would not recommend any further increase at the present time.  I updated his prescription.    Regarding his right leg paresthesia, he completed EMG, which was consistent with severe symmetric length dependent sensorimotor axonal polyneuropathy (likely related to his diabetes), but no lumbosacral radiculopathy was found.  He also had incidental finding of left mild to moderate ulnar neuropathy at the elbow.  We discussed EMG results along with diagnosis, available treatment options, and the plan, as summarized below.    Diagnoses:    ICD-10-CM    1. Essential tremor  G25.0       2. Peripheral polyneuropathy  G62.9       3. 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    Will continue primidone at 100 mg twice daily.    We decided to try alpha lipoic acid at 600 mg daily.  We discussed that the dose could be divided into 2-3 times per day to be taken with food to avoid stomach irritation.    Regarding ulnar neuropathy, we discussed that he could consider wearing cubital tunnel brace at night and also avoid bending past 90 degrees for prolonged time or prolonged pressure on the inner side of the left elbow.    Next follow-up appointment is in the next 6-7 months or earlier if needed.    Total Time: 31 minutes spent on the date of the encounter doing chart review, history and exam, documentation and further activities per the note.    Guillermo Rivera MD  [facility] Neurology  (Chart documentation was completed in part with Dragon voice-recognition software. Even though reviewed, some grammatical, spelling, and word errors may remain.)

## 2024-10-07 NOTE — LETTER
10/7/2024      Bill Smith  [address]  [place] [address]      Dear Colleague,    Thank you for referring your patient, Bill Smith, to the [facility] NEUROLOGY CLINICS [facility]. Please see a copy of my visit note below.    ESTABLISHED PATIENT NEUROLOGY NOTE    DATE OF VISIT: 10/7/2024  CLINIC LOCATION: [facility]  MRN: [id number]  PATIENT NAME: Bill Smith  YOB: 1950    REASON FOR VISIT:   Chief Complaint   Patient presents with     Follow Up     Review EMG     SUBJECTIVE:                                                      HISTORY OF PRESENT ILLNESS: Patient is here to follow up regarding essential tremor and right leg paresthesia.  The last visit was on 5/28/2024.  Please refer to my initial/other prior notes for further information.    Since the last visit, the patient reports that he decided to increase primidone to 100 mg twice daily on his own approximately 3 to 4 weeks ago, but so far did not notice any tremor and improvement.  He denies any significant side effects.  No new focal neurological symptoms.    EMG from 10/3/2024 was abnormal demonstrating severe symmetric length dependent sensorimotor axonal polyneuropathy in addition to mild to moderate left-sided ulnar neuropathy at the elbow.  Tabulated data from EMG report were personally reviewed and independently interpreted.  EXAM:                                                    Physical Exam:   Vitals: /82 (BP Location: Right arm, Patient Position: Sitting, Cuff Size: Adult Large)   Pulse 72   SpO2 96%     General: pt is in NAD, cooperative.  Skin: normal turgor, moist mucous membranes, no lesions/rashes noticed.  HEENT: ATNC, white sclera, normal conjunctiva.  Respiratory: Symmetric lung excursion, no accessory respiratory muscle use.  Abdomen: Non distended.  Neurological: awake, cooperative, follows commands, no exam changes compared to the previous visits, continues to  have mild right and moderate left positional and kinetic hand tremor, walks with a cane.  ASSESSMENT AND PLAN:                                                    Assessment: 73 year old male patient presents for follow-up of essential tremor and right leg paresthesia.      Regarding his essential tremor, the patient increase primidone to 100 mg twice daily on his own, and so far did not notice any tremor improvement.  In my opinion, his tremor remains stable on current dose, and I would not recommend any further increase at the present time.  I updated his prescription.    Regarding his right leg paresthesia, he completed EMG, which was consistent with severe symmetric length dependent sensorimotor axonal polyneuropathy (likely related to his diabetes), but no lumbosacral radiculopathy was found.  He also had incidental finding of left mild to moderate ulnar neuropathy at the elbow.  We discussed EMG results along with diagnosis, available treatment options, and the plan, as summarized below.    Diagnoses:    ICD-10-CM    1. Essential tremor  G25.0       2. Peripheral polyneuropathy  G62.9       3. 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    Will continue primidone at 100 mg twice daily.    We decided to try alpha lipoic acid at 600 mg daily.  We discussed that the dose could be divided into 2-3 times per day to be taken with food to avoid stomach irritation.    Regarding ulnar neuropathy, we discussed that he could consider wearing cubital tunnel brace at night and also avoid bending past 90 degrees for prolonged time or prolonged pressure on the inner side of the left elbow.    Next follow-up appointment is in the next 6-7 months or earlier if needed.    Total Time: 31 minutes spent on the date of the encounter doing chart review, history and exam, documentation and  "further activities per the note.    Guillermo Rivera MD  [facility] Neurology  (Chart documentation was completed in part with Dragon voice-recognition software. Even though reviewed, some grammatical, spelling, and word errors may remain.)    Bill Smith is a 73 year old male who presents for:  Chief Complaint   Patient presents with     Follow Up     Review EMG        Initial Vitals:  /82 (BP Location: Right arm, Patient Position: Sitting, Cuff Size: Adult Large)   Pulse 72   SpO2 96%  Estimated body mass index is 31.19 kg/m  as calculated from the following:    Height as of 10/6/23: 1.861 m (6' 1.25\").    Weight as of 10/6/23: 108 kg (238 lb).. There is no height or weight on file to calculate BSA. BP completed using cuff size: large    Darling Garrido       Again, thank you for allowing me to participate in the care of your patient.        Sincerely,        Guillermo Rivera MD  "

## 2024-10-07 NOTE — PATIENT INSTRUCTIONS
AFTER VISIT SUMMARY (AVS):    At today's visit we thoroughly discussed current symptoms, evaluation results, diagnosis, available treatment options, and the plan.    Will continue primidone at 100 mg twice daily.    We decided to try alpha lipoic acid at 600 mg daily.  We discussed that the dose could be divided into 2-3 times per day to be taken with food to avoid stomach irritation.    Regarding your ulnar neuropathy, we discussed that you could consider wearing cubital tunnel brace at night and also avoid bending past 90 degrees for prolonged time or prolonged pressure on the inner side of your elbow.    Next follow-up appointment is in the next 6-7 months or earlier if needed.    Please do not hesitate to call me with any questions or concerns.    Thanks.

## 2024-10-08 ENCOUNTER — PATIENT OUTREACH (OUTPATIENT)
Dept: CARE COORDINATION | Facility: CLINIC | Age: 74
End: 2024-10-08
Payer: MEDICARE

## 2024-10-08 ENCOUNTER — MYC REFILL (OUTPATIENT)
Dept: FAMILY MEDICINE | Facility: CLINIC | Age: 74
End: 2024-10-08
Payer: MEDICARE

## 2024-10-08 DIAGNOSIS — E11.9 TYPE 2 DIABETES MELLITUS WITHOUT COMPLICATION, WITHOUT LONG-TERM CURRENT USE OF INSULIN (H): ICD-10-CM

## 2024-10-10 ENCOUNTER — THERAPY VISIT (OUTPATIENT)
Dept: PHYSICAL THERAPY | Facility: CLINIC | Age: 74
End: 2024-10-10
Payer: MEDICARE

## 2024-10-10 DIAGNOSIS — M25.561 CHRONIC PAIN OF BOTH KNEES: Primary | ICD-10-CM

## 2024-10-10 DIAGNOSIS — G89.29 CHRONIC PAIN OF BOTH KNEES: Primary | ICD-10-CM

## 2024-10-10 DIAGNOSIS — M25.562 CHRONIC PAIN OF BOTH KNEES: Primary | ICD-10-CM

## 2024-10-10 PROCEDURE — 97110 THERAPEUTIC EXERCISES: CPT | Mod: GP | Performed by: PHYSICAL THERAPIST

## 2024-10-11 ENCOUNTER — TELEPHONE (OUTPATIENT)
Dept: FAMILY MEDICINE | Facility: CLINIC | Age: 74
End: 2024-10-11
Payer: MEDICARE

## 2024-10-11 NOTE — TELEPHONE ENCOUNTER
metFORMIN  metFORMIN (GLUCOPHAGE) 500 MG tablet    Should patient be on a moderate intensity statin?

## 2024-10-14 NOTE — TELEPHONE ENCOUNTER
Please let patient know that his diabetes test, hemoglobin A1c, was okay last checked 2 months ago. Okay to continue with current dose. If this test increases in the future, can increase the dose. Patient has a Medicare Wellness visit on the 22nd and this test can be rechecked.    Nirmal Lubin MD

## 2024-10-14 NOTE — TELEPHONE ENCOUNTER
RN spoke with pt regarding provider message below. Pt verbalized understanding, no questions.     Charlotte Callejas RN

## 2024-10-15 ENCOUNTER — TRANSFERRED RECORDS (OUTPATIENT)
Dept: HEALTH INFORMATION MANAGEMENT | Facility: CLINIC | Age: 74
End: 2024-10-15
Payer: MEDICARE

## 2024-10-17 SDOH — HEALTH STABILITY: PHYSICAL HEALTH: ON AVERAGE, HOW MANY MINUTES DO YOU ENGAGE IN EXERCISE AT THIS LEVEL?: 20 MIN

## 2024-10-17 SDOH — HEALTH STABILITY: PHYSICAL HEALTH: ON AVERAGE, HOW MANY DAYS PER WEEK DO YOU ENGAGE IN MODERATE TO STRENUOUS EXERCISE (LIKE A BRISK WALK)?: 3 DAYS

## 2024-10-17 ASSESSMENT — SOCIAL DETERMINANTS OF HEALTH (SDOH): HOW OFTEN DO YOU GET TOGETHER WITH FRIENDS OR RELATIVES?: ONCE A WEEK

## 2024-10-22 ENCOUNTER — OFFICE VISIT (OUTPATIENT)
Dept: FAMILY MEDICINE | Facility: OTHER | Age: 74
End: 2024-10-22
Payer: MEDICARE

## 2024-10-22 VITALS
HEART RATE: 74 BPM | DIASTOLIC BLOOD PRESSURE: 80 MMHG | TEMPERATURE: 97.8 F | OXYGEN SATURATION: 97 % | WEIGHT: 245 LBS | HEIGHT: 72 IN | SYSTOLIC BLOOD PRESSURE: 128 MMHG | BODY MASS INDEX: 33.18 KG/M2 | RESPIRATION RATE: 17 BRPM

## 2024-10-22 DIAGNOSIS — E11.8 TYPE 2 DIABETES MELLITUS WITH UNSPECIFIED COMPLICATIONS (H): ICD-10-CM

## 2024-10-22 DIAGNOSIS — I10 ESSENTIAL HYPERTENSION WITH GOAL BLOOD PRESSURE LESS THAN 140/90: ICD-10-CM

## 2024-10-22 DIAGNOSIS — Z00.00 ENCOUNTER FOR MEDICARE ANNUAL WELLNESS EXAM: Primary | ICD-10-CM

## 2024-10-22 DIAGNOSIS — G62.9 PERIPHERAL POLYNEUROPATHY: ICD-10-CM

## 2024-10-22 DIAGNOSIS — Z71.89 ADVANCE CARE PLANNING: ICD-10-CM

## 2024-10-22 LAB
ANION GAP SERPL CALCULATED.3IONS-SCNC: 14 MMOL/L (ref 7–15)
BUN SERPL-MCNC: 9.6 MG/DL (ref 8–23)
CALCIUM SERPL-MCNC: 8.8 MG/DL (ref 8.8–10.4)
CHLORIDE SERPL-SCNC: 104 MMOL/L (ref 98–107)
CHOLEST SERPL-MCNC: 143 MG/DL
CREAT SERPL-MCNC: 0.71 MG/DL (ref 0.67–1.17)
CREAT UR-MCNC: 301.9 MG/DL
EGFRCR SERPLBLD CKD-EPI 2021: >90 ML/MIN/1.73M2
EST. AVERAGE GLUCOSE BLD GHB EST-MCNC: 146 MG/DL
FASTING STATUS PATIENT QL REPORTED: YES
FASTING STATUS PATIENT QL REPORTED: YES
GLUCOSE SERPL-MCNC: 141 MG/DL (ref 70–99)
HBA1C MFR BLD: 6.7 % (ref 0–5.6)
HCO3 SERPL-SCNC: 24 MMOL/L (ref 22–29)
HDLC SERPL-MCNC: 36 MG/DL
LDLC SERPL CALC-MCNC: 79 MG/DL
MICROALBUMIN UR-MCNC: 34.9 MG/L
MICROALBUMIN/CREAT UR: 11.56 MG/G CR (ref 0–17)
NONHDLC SERPL-MCNC: 107 MG/DL
POTASSIUM SERPL-SCNC: 3.5 MMOL/L (ref 3.4–5.3)
SODIUM SERPL-SCNC: 142 MMOL/L (ref 135–145)
TRIGL SERPL-MCNC: 139 MG/DL

## 2024-10-22 PROCEDURE — 80061 LIPID PANEL: CPT

## 2024-10-22 PROCEDURE — G0008 ADMIN INFLUENZA VIRUS VAC: HCPCS

## 2024-10-22 PROCEDURE — 90480 ADMN SARSCOV2 VAC 1/ONLY CMP: CPT

## 2024-10-22 PROCEDURE — G0439 PPPS, SUBSEQ VISIT: HCPCS

## 2024-10-22 PROCEDURE — 99497 ADVNCD CARE PLAN 30 MIN: CPT | Mod: 4MD

## 2024-10-22 PROCEDURE — 90662 IIV NO PRSV INCREASED AG IM: CPT

## 2024-10-22 PROCEDURE — 82570 ASSAY OF URINE CREATININE: CPT

## 2024-10-22 PROCEDURE — 99213 OFFICE O/P EST LOW 20 MIN: CPT | Mod: 25

## 2024-10-22 PROCEDURE — 82043 UR ALBUMIN QUANTITATIVE: CPT

## 2024-10-22 PROCEDURE — 83036 HEMOGLOBIN GLYCOSYLATED A1C: CPT

## 2024-10-22 PROCEDURE — 91320 SARSCV2 VAC 30MCG TRS-SUC IM: CPT

## 2024-10-22 PROCEDURE — 80048 BASIC METABOLIC PNL TOTAL CA: CPT

## 2024-10-22 PROCEDURE — 36415 COLL VENOUS BLD VENIPUNCTURE: CPT

## 2024-10-22 ASSESSMENT — PAIN SCALES - GENERAL: PAINLEVEL: MODERATE PAIN (5)

## 2024-10-22 NOTE — PROGRESS NOTES
"Preventive Care Visit  [facility]  Vamshi Yantyler , Family Practice  Oct 22, 2024      Assessment & Plan     Encounter for Medicare annual wellness exam    Type 2 diabetes mellitus with unspecified complications (H)  A1c obtained today and is 6.7, continue once-daily metformin.  - Lipid panel reflex to direct LDL Non-fasting; Future  - Albumin Random Urine Quantitative with Creat Ratio; Future  - Hemoglobin A1c; Future  - Lipid panel reflex to direct LDL Non-fasting  - Albumin Random Urine Quantitative with Creat Ratio  - Hemoglobin A1c    Essential hypertension with goal blood pressure less than 140/90  Stable. Continue current medication regimen: carvedilol, losartan, chlorthalidone, hydralazine. Recheck BMP today.  - BASIC METABOLIC PANEL; Future  - BASIC METABOLIC PANEL    Advance care planning  - NH ADVANCE CARE PLANNING FIRST 30 MINS    BMI  Estimated body mass index is 33.11 kg/m  as calculated from the following:    Height as of this encounter: 1.832 m (6' 0.13\").    Weight as of this encounter: 111.1 kg (245 lb).   Weight management plan: Discussed healthy diet and exercise guidelines    Counseling  Appropriate preventive services were addressed with this patient via screening, questionnaire, or discussion as appropriate for fall prevention, nutrition, physical activity, Tobacco-use cessation, social engagement, weight loss and cognition.  Checklist reviewing preventive services available has been given to the patient.  Reviewed patient's diet, addressing concerns and/or questions.   He is at risk for lack of exercise and has been provided with information to increase physical activity for the benefit of his well-being.   Addressed any concerns about safety while driving.  The patient was provided with written information regarding signs of hearing loss.   Information on urinary incontinence and treatment options given to patient.     Follow-up within 6 months for diabetes and " hypertension.    Subjective   Bill is a 74 year old, presenting for the following:  Physical        10/22/2024     9:59 AM   Additional Questions   Roomed by Sheri ROSE   Accompanied by Wife Brooke         10/22/2024     9:59 AM   Patient Reported Additional Medications   Patient reports taking the following new medications alpha-lipoic-acid     Health Care Directive  Patient does not have a Health Care Directive or Living Will: Discussed advance care planning with patient; information given to patient to review.    HPI  Patient is doing well. Here with his wife. They are traveling to [place] for the winter as they do yearly    Diagnosed with DM2 about 10 years ago.  His A1c has been 6-7s range. Currently taking metformin once daily.    He has been following with neurology for his tremor. Currently on primidone. This has been stable.        10/17/2024   General Health   How would you rate your overall physical health? Good   Feel stress (tense, anxious, or unable to sleep) Not at all            10/17/2024   Nutrition   Diet: Diabetic            10/17/2024   Exercise   Days per week of moderate/strenous exercise 3 days   Average minutes spent exercising at this level 20 min            10/17/2024   Social Factors   Frequency of gathering with friends or relatives Once a week   Worry food won't last until get money to buy more No   Food not last or not have enough money for food? No   Do you have housing? (Housing is defined as stable permanent housing and does not include staying ouside in a car, in a tent, in an abandoned building, in an overnight shelter, or couch-surfing.) Yes   Are you worried about losing your housing? No   Lack of transportation? No   Unable to get utilities (heat,electricity)? No            10/22/2024   Fall Risk   Gait Speed Test (Document in seconds) 5.66   Gait Speed Test Interpretation Greater than 5.01 seconds - ABNORMAL             10/17/2024   Activities of Daily Living- Home Safety    Needs help with the following daily activites None of the above   Safety concerns in the home None of the above            10/17/2024   Dental   Dentist two times every year? Yes            10/17/2024   Hearing Screening   Hearing concerns? (!) TROUBLE UNDERSTANDING SOFT OR WHISPERED SPEECH.            10/17/2024   Driving Risk Screening   Patient/family members have concerns about driving (!) YES, at times wife notes he is driving below speed limit             10/17/2024   General Alertness/Fatigue Screening   Have you been more tired than usual lately? No            10/17/2024   Urinary Incontinence Screening   Bothered by leaking urine in past 6 months Yes            10/17/2024   TB Screening   Were you born outside of the [place]? No            Today's PHQ-2 Score:       10/21/2024     5:20 PM   PHQ-2 ( 1999 [employer])   Q1: Little interest or pleasure in doing things 0   Q2: Feeling down, depressed or hopeless 0   PHQ-2 Score 0   Q1: Little interest or pleasure in doing things Not at all   Q2: Feeling down, depressed or hopeless Not at all   PHQ-2 Score 0           10/17/2024   Substance Use   Alcohol more than 3/day or more than 7/wk No   Do you have a current opioid prescription? No   How severe/bad is pain from 1 to 10? 6/10   Do you use any other substances recreationally? No        Social History     Tobacco Use    Smoking status: Never     Passive exposure: Never    Smokeless tobacco: Never   Vaping Use    Vaping status: Never Used   Substance Use Topics    Alcohol use: Not Currently     Comment: rarely    Drug use: No       ASCVD Risk   The 10-year ASCVD risk score (Aiden ATKINS, et al., 2019) is: 49.5%    Values used to calculate the score:      Age: 74 years      Sex: Male      Is Non- : No      Diabetic: Yes      Tobacco smoker: No      Systolic Blood Pressure: 128 mmHg      Is BP treated: Yes      HDL Cholesterol: 36 mg/dL      Total Cholesterol: 137 mg/dL    Fracture Risk  Assessment Tool  Link to Frax Calculator  Use the information below to complete the Frax calculator  : 1950  Sex: male  Weight (kg): 111.1 kg (actual weight)  Height (cm): 183.2 cm  Previous Fragility Fracture:  No  History of parent with fractured hip:  No  Current Smoking:  No  Patient has been on glucocorticoids for more than 3 months (5mg/day or more): No  Rheumatoid Arthritis on Problem List:  No  Secondary Osteoporosis on Problem List:  No  Consumes 3 or more units of alcohol per day: No  Femoral Neck BMD (g/cm2)            Reviewed and updated as needed this visit by Provider                    Current providers sharing in care for this patient include:  Patient Care Team:  Vamshi Uriarte DO as PCP - General (Family Practice)  Nirmal Lubin MD as Assigned PCP  Guillermo Rivera MD as Assigned Neuroscience Provider  Miky Jarvis MD as Assigned Musculoskeletal Provider    The following health maintenance items are reviewed in Epic and correct as of today:  Health Maintenance   Topic Date Due    DIABETIC FOOT EXAM  10/04/2023    INFLUENZA VACCINE (1) 2024    COVID-19 Vaccine ( season) 2024    BMP  10/06/2024    LIPID  10/06/2024    ANNUAL REVIEW OF HM ORDERS  10/06/2024    MICROALBUMIN  10/07/2024    MEDICARE ANNUAL WELLNESS VISIT  10/06/2024    A1C  2024    EYE EXAM  10/15/2025    FALL RISK ASSESSMENT  10/22/2025    ADVANCE CARE PLANNING  10/06/2028    DTAP/TDAP/TD IMMUNIZATION (3 - Td or Tdap) 2033    HEPATITIS C SCREENING  Completed    PHQ-2 (once per calendar year)  Completed    Pneumococcal Vaccine: 65+ Years  Completed    ZOSTER IMMUNIZATION  Completed    RSV VACCINE  Completed    HPV IMMUNIZATION  Aged Out    MENINGITIS IMMUNIZATION  Aged Out    RSV MONOCLONAL ANTIBODY  Aged Out    COLORECTAL CANCER SCREENING  Discontinued        Objective    Exam  /80 (Cuff Size: Adult Large)   Pulse 74   Temp 97.8  F (36.6  C)   Resp 17   Ht  "1.832 m (6' 0.13\")   Wt 111.1 kg (245 lb)   SpO2 97%   BMI 33.11 kg/m     Estimated body mass index is 33.11 kg/m  as calculated from the following:    Height as of this encounter: 1.832 m (6' 0.13\").    Weight as of this encounter: 111.1 kg (245 lb).    Physical Exam  Constitutional:       General: He is not in acute distress.     Appearance: Normal appearance. He is not ill-appearing.   Pulmonary:      Effort: Pulmonary effort is normal. No respiratory distress.      Breath sounds: No wheezing.   Skin:     General: Skin is warm and dry.   Neurological:      General: No focal deficit present.      Mental Status: He is alert and oriented to person, place, and time.   Psychiatric:         Mood and Affect: Mood normal.         Behavior: Behavior normal.               10/22/2024   Mini Cog   Clock Draw Score 2 Normal   3 Item Recall 2 objects recalled   Mini Cog Total Score 4                 Signed Electronically by: Vamshi Uriarte DO    "

## 2024-10-22 NOTE — PATIENT INSTRUCTIONS
Patient Education   Preventive Care Advice   This is general advice given by our system to help you stay healthy. However, your care team may have specific advice just for you. Please talk to your care team about your preventive care needs.  Nutrition  Eat 5 or more servings of fruits and vegetables each day.  Try wheat bread, brown rice and whole grain pasta (instead of white bread, rice, and pasta).  Get enough calcium and vitamin D. Check the label on foods and aim for 100% of the RDA (recommended daily allowance).  Lifestyle  Exercise at least 150 minutes each week  (30 minutes a day, 5 days a week).  Do muscle strengthening activities 2 days a week. These help control your weight and prevent disease.  No smoking.  Wear sunscreen to prevent skin cancer.  Have a dental exam and cleaning every 6 months.  Yearly exams  See your health care team every year to talk about:  Any changes in your health.  Any medicines your care team has prescribed.  Preventive care, family planning, and ways to prevent chronic diseases.  Shots (vaccines)   HPV shots (up to age 26), if you've never had them before.  Hepatitis B shots (up to age 59), if you've never had them before.  COVID-19 shot: Get this shot when it's due.  Flu shot: Get a flu shot every year.  Tetanus shot: Get a tetanus shot every 10 years.  Pneumococcal, hepatitis A, and RSV shots: Ask your care team if you need these based on your risk.  Shingles shot (for age 50 and up)  General health tests  Diabetes screening:  Starting at age 35, Get screened for diabetes at least every 3 years.  If you are younger than age 35, ask your care team if you should be screened for diabetes.  Cholesterol test: At age 39, start having a cholesterol test every 5 years, or more often if advised.  Bone density scan (DEXA): At age 50, ask your care team if you should have this scan for osteoporosis (brittle bones).  Hepatitis C: Get tested at least once in your life.  STIs (sexually  transmitted infections)  Before age 24: Ask your care team if you should be screened for STIs.  After age 24: Get screened for STIs if you're at risk. You are at risk for STIs (including HIV) if:  You are sexually active with more than one person.  You don't use condoms every time.  You or a partner was diagnosed with a sexually transmitted infection.  If you are at risk for HIV, ask about PrEP medicine to prevent HIV.  Get tested for HIV at least once in your life, whether you are at risk for HIV or not.  Cancer screening tests  Cervical cancer screening: If you have a cervix, begin getting regular cervical cancer screening tests starting at age 21.  Breast cancer scan (mammogram): If you've ever had breasts, begin having regular mammograms starting at age 40. This is a scan to check for breast cancer.  Colon cancer screening: It is important to start screening for colon cancer at age 45.  Have a colonoscopy test every 10 years (or more often if you're at risk) Or, ask your provider about stool tests like a FIT test every year or Cologuard test every 3 years.  To learn more about your testing options, visit:   .  For help making a decision, visit:   https://bit.ly/rx66583.  Prostate cancer screening test: If you have a prostate, ask your care team if a prostate cancer screening test (PSA) at age 55 is right for you.  Lung cancer screening: If you are a current or former smoker ages 50 to 80, ask your care team if ongoing lung cancer screenings are right for you.  For informational purposes only. Not to replace the advice of your health care provider. Copyright   2023 [facility]. All rights reserved. Clinically reviewed by the [facility]. [employer] [id number] - REV 01/24.  Preventing Falls: Care Instructions  Injuries and health problems such as trouble walking or poor eyesight can increase your risk of falling. So can some medicines. But there are things you can do to help  "prevent falls. You can exercise to get stronger. You can also arrange your home to make it safer.    Talk to your doctor about the medicines you take. Ask if any of them increase the risk of falls and whether they can be changed or stopped.   Try to exercise regularly. It can help improve your strength and balance. This can help lower your risk of falling.         Practice fall safety and prevention.   Wear low-heeled shoes that fit well and give your feet good support. Talk to your doctor if you have foot problems that make this hard.  Carry a cellphone or wear a medical alert device that you can use to call for help.  Use stepladders instead of chairs to reach high objects. Don't climb if you're at risk for falls. Ask for help, if needed.  Wear the correct eyeglasses, if you need them.        Make your home safer.   Remove rugs, cords, clutter, and furniture from walkways.  Keep your house well lit. Use night-lights in hallways and bathrooms.  Install and use sturdy handrails on stairways.  Wear nonskid footwear, even inside. Don't walk barefoot or in socks without shoes.        Be safe outside.   Use handrails, curb cuts, and ramps whenever possible.  Keep your hands free by using a shoulder bag or backpack.  Try to walk in well-lit areas. Watch out for uneven ground, changes in pavement, and debris.  Be careful in the winter. Walk on the grass or gravel when sidewalks are slippery. Use de-icer on steps and walkways. Add non-slip devices to shoes.    Put grab bars and nonskid mats in your shower or tub and near the toilet. Try to use a shower chair or bath bench when bathing.   Get into a tub or shower by putting in your weaker leg first. Get out with your strong side first. Have a phone or medical alert device in the bathroom with you.   Where can you learn more?  Go to https://www.TastingRoom.comwise.net/patiented  Enter G117 in the search box to learn more about \"Preventing Falls: Care Instructions.\"  Current as of: " July 17, 2023  Content Version: 14.2 2024 [facility] [employer]   Care instructions adapted under license by your healthcare professional. If you have questions about a medical condition or this instruction, always ask your healthcare professional. [employer] disclaims any warranty or liability for your use of this information.    Hearing Loss: Care Instructions  Overview     Hearing loss is a sudden or slow decrease in how well you hear. It can range from slight to profound. Permanent hearing loss can occur with aging. It also can happen when you are exposed long-term to loud noise. Examples include listening to loud music, riding motorcycles, or being around other loud machines.  Hearing loss can affect your work and home life. It can make you feel lonely or depressed. You may feel that you have lost your independence. But hearing aids and other devices can help you hear better and feel connected to others.  Follow-up care is a key part of your treatment and safety. Be sure to make and go to all appointments, and call your doctor if you are having problems. It's also a good idea to know your test results and keep a list of the medicines you take.  How can you care for yourself at home?  Avoid loud noises whenever possible. This helps keep your hearing from getting worse.  Always wear hearing protection around loud noises.  Wear a hearing aid as directed.  A professional can help you pick a hearing aid that will work best for you.  You can also get hearing aids over the counter for mild to moderate hearing loss.  Have hearing tests as your doctor suggests. They can show whether your hearing has changed. Your hearing aid may need to be adjusted.  Use other devices as needed. These may include:  Telephone amplifiers and hearing aids that can connect to a television, stereo, radio, or microphone.  Devices that use lights or vibrations. These alert you to the doorbell, a ringing telephone, or a baby  "monitor.  Television closed-captioning. This shows the words at the bottom of the screen. Most new TVs can do this.  TTY (text telephone). This lets you type messages back and forth on the telephone instead of talking or listening. These devices are also called TDD. When messages are typed on the keyboard, they are sent over the phone line to a receiving TTY. The message is shown on a monitor.  Use text messaging, social media, and email if it is hard for you to communicate by telephone.  Try to learn a listening technique called speechreading. It is not lipreading. You pay attention to people's gestures, expressions, posture, and tone of voice. These clues can help you understand what a person is saying. Face the person you are talking to, and have them face you. Make sure the lighting is good. You need to see the other person's face clearly.  Think about counseling if you need help to adjust to your hearing loss.  When should you call for help?  Watch closely for changes in your health, and be sure to contact your doctor if:    You think your hearing is getting worse.     You have new symptoms, such as dizziness or nausea.   Where can you learn more?  Go to https://www.[employer].net/patiented  Enter R798 in the search box to learn more about \"Hearing Loss: Care Instructions.\"  Current as of: September 27, 2023  Content Version: 14.2 2024 [facility] [employer].   Care instructions adapted under license by your healthcare professional. If you have questions about a medical condition or this instruction, always ask your healthcare professional. [employer] disclaims any warranty or liability for your use of this information.    Bladder Training: Care Instructions  Your Care Instructions     Bladder training is used to treat urge incontinence and stress incontinence. Urge incontinence means that the need to urinate comes on so fast that you can't get to a toilet in time. Stress incontinence means " that you leak urine because of pressure on your bladder. For example, it may happen when you laugh, cough, or lift something heavy.  Bladder training can increase how long you can wait before you have to urinate. It can also help your bladder hold more urine. And it can give you better control over the urge to urinate.  It is important to remember that bladder training takes a few weeks to a few months to make a difference. You may not see results right away, but don't give up.  Follow-up care is a key part of your treatment and safety. Be sure to make and go to all appointments, and call your doctor if you are having problems. It's also a good idea to know your test results and keep a list of the medicines you take.  How can you care for yourself at home?  Work with your doctor to come up with a bladder training program that is right for you. You may use one or more of the following methods.  Delayed urination  In the beginning, try to keep from urinating for 5 minutes after you first feel the need to go.  While you wait, take deep, slow breaths to relax. Kegel exercises can also help you delay the need to go to the bathroom.  After some practice, when you can easily wait 5 minutes to urinate, try to wait 10 minutes before you urinate.  Slowly increase the waiting period until you are able to control when you have to urinate.  Scheduled urination  Empty your bladder when you first wake up in the morning.  Schedule times throughout the day when you will urinate.  Start by going to the bathroom every hour, even if you don't need to go.  Slowly increase the time between trips to the bathroom.  When you have found a schedule that works well for you, keep doing it.  If you wake up during the night and have to urinate, do it. Apply your schedule to waking hours only.  Kegel exercises  These tighten and strengthen pelvic muscles, which can help you control the flow of urine. (If doing these exercises causes pain, stop  "doing them and talk with your doctor.) To do Kegel exercises:  Squeeze your muscles as if you were trying not to pass gas. Or squeeze your muscles as if you were stopping the flow of urine. Your belly, legs, and buttocks shouldn't move.  Hold the squeeze for 3 seconds, then relax for 5 to 10 seconds.  Start with 3 seconds, then add 1 second each week until you are able to squeeze for 10 seconds.  Repeat the exercise 10 times a session. Do 3 to 8 sessions a day.  When should you call for help?  Watch closely for changes in your health, and be sure to contact your doctor if:    Your incontinence is getting worse.     You do not get better as expected.   Where can you learn more?  Go to https://www.[employer].net/patiented  Enter V684 in the search box to learn more about \"Bladder Training: Care Instructions.\"  Current as of: November 15, 2023  Content Version: 14.2 2024 [facility] [employer].   Care instructions adapted under license by your healthcare professional. If you have questions about a medical condition or this instruction, always ask your healthcare professional. Healthwise[employer] Incorporated disclaims any warranty or liability for your use of this information.       "

## 2024-10-25 ENCOUNTER — PATIENT OUTREACH (OUTPATIENT)
Dept: CARE COORDINATION | Facility: CLINIC | Age: 74
End: 2024-10-25

## 2024-10-25 ENCOUNTER — THERAPY VISIT (OUTPATIENT)
Dept: PHYSICAL THERAPY | Facility: CLINIC | Age: 74
End: 2024-10-25
Payer: MEDICARE

## 2024-10-25 DIAGNOSIS — M25.562 CHRONIC PAIN OF BOTH KNEES: Primary | ICD-10-CM

## 2024-10-25 DIAGNOSIS — G89.29 CHRONIC PAIN OF BOTH KNEES: Primary | ICD-10-CM

## 2024-10-25 DIAGNOSIS — M25.561 CHRONIC PAIN OF BOTH KNEES: Primary | ICD-10-CM

## 2024-10-25 PROCEDURE — 97110 THERAPEUTIC EXERCISES: CPT | Mod: GP | Performed by: PHYSICAL THERAPIST

## 2024-10-25 NOTE — PROGRESS NOTES
10/25/24 0500   Appointment Info   Signing clinician's name / credentials Wallace Bradley DPT   Total/Authorized Visits 8   Visits Used 6   Medical Diagnosis Chronic pain of left knee  S/P revision of total knee, right   PT Tx Diagnosis bilateral knee pain , weak LE, right knee s/p   Progress Note/Certification   Start of Care Date 09/12/24   Onset of illness/injury or Date of Surgery 08/11/23   Therapy Frequency 1 x/ week   Predicted Duration 8 weeks   Certification date from 09/12/24   Certification date to 11/07/24   Progress Note Due Date 11/07/24   Progress Note Completed Date 09/12/24       Present No   GOALS   PT Goals 2   PT Goal 1   Goal Identifier transfer   Goal Description pt will be able to get in and out of a chair / toilet without use of UE.   Rationale to maximize safety and independence with performance of ADLs and functional tasks   Goal Progress no noticable change yet still using hands to help up   Target Date 11/07/24   Subjective Report   Subjective Report pt reports the last couple of days he has been doing the exercises and his knees are kind of achy. He did not rest the last couple of day. Rain makes his pain worse usually.   Objective Measures   Objective Measures Objective Measure 1;Objective Measure 2;Objective Measure 3   Objective Measure 1   Objective Measure functional test   Details sit to stand: unable without hand support   Objective Measure 2   Objective Measure Gait   Details no AD today; trialing witout as he is feeling good this morning   Treatment Interventions (PT)   Interventions Therapeutic Procedure/Exercise   Therapeutic Procedure/Exercise   Therapeutic Procedures: strength, endurance, ROM, flexibility minutes (75788) 40   Therapeutic Procedures Ther Proc 2   Ther Proc 1 bike   Ther Proc 1 - Details 5 min   PTRx Ther Proc 1 Heel Slides   PTRx Ther Proc 1 - Details x 10    PTRx Ther Proc 2 Knee Extension in Sitting with Patient Overpressure   PTRx  Ther Proc 2 - Details No Notes   PTRx Ther Proc 3 Hip Flexion Straight Leg Raise   PTRx Ther Proc 3 - Details x 20 no weight as warm up x 20 3lbs x 20 x 20 x 20    PTRx Ther Proc 4 Seated Hamstring Curl with Tubing   PTRx Ther Proc 4 - Details reviewed with black band and got fatigue behind knee so continue to do.    PTRx Ther Proc 5 Hamstring Curl on Ball   PTRx Ther Proc 5 - Details No Notes   PTRx Ther Proc 6 Squat With Chair   PTRx Ther Proc 6 - Details making knee sore.    PTRx Ther Proc 7 Bridging   PTRx Ther Proc 7 - Details x 20 no weight 20lbs x 20 x20    PTRx Ther Proc 8 Single Leg Bridge   PTRx Ther Proc 8 - Details to hard.    PTRx Ther Proc 9 Seated Knee Extension With Theraband   PTRx Ther Proc 9 - Details 15lbs 3 x 20    Skilled Intervention not tolerating closed chain exercises so educated on how to progress open chain exercises for when he is down in [place],   Patient Response/Progress pt having increased achiness in knees 1-2 day after doing past HEP.   PTRx Ther Proc 10 Hip Abduction Straight Leg Raise   PTRx Ther Proc 10 - Details 3lbs x 20x 20    Therapeutic Activity   PTRx Ther Act 1 Stair Step Ups   PTRx Ther Act 1 - Details possibly making knee sore.    Education   Learner/Method Patient;Reading;Demonstration;Pictures/Video;No Barriers to Learning   Plan   Home program PTRX   Updates to plan of care DC with HEP         DISCHARGE  Reason for Discharge: Pt traveling south to [place] for winter    Equipment Issued:     Discharge Plan: Patient to continue home program.    Referring Provider:  Miky Jarvis

## 2024-10-26 ENCOUNTER — MYC MEDICAL ADVICE (OUTPATIENT)
Dept: FAMILY MEDICINE | Facility: OTHER | Age: 74
End: 2024-10-26
Payer: MEDICARE

## 2024-10-28 ENCOUNTER — PATIENT OUTREACH (OUTPATIENT)
Dept: CARE COORDINATION | Facility: CLINIC | Age: 74
End: 2024-10-28
Payer: MEDICARE

## 2024-10-28 ENCOUNTER — OFFICE VISIT (OUTPATIENT)
Dept: ORTHOPEDICS | Facility: CLINIC | Age: 74
End: 2024-10-28
Payer: MEDICARE

## 2024-10-28 VITALS — WEIGHT: 245 LBS | HEIGHT: 72 IN | BODY MASS INDEX: 33.18 KG/M2

## 2024-10-28 DIAGNOSIS — M17.12 PRIMARY OSTEOARTHRITIS OF LEFT KNEE: Primary | ICD-10-CM

## 2024-10-28 PROCEDURE — 99204 OFFICE O/P NEW MOD 45 MIN: CPT | Performed by: STUDENT IN AN ORGANIZED HEALTH CARE EDUCATION/TRAINING PROGRAM

## 2024-10-28 RX ORDER — DICLOFENAC SODIUM 75 MG/1
75 TABLET, DELAYED RELEASE ORAL 2 TIMES DAILY
Qty: 180 TABLET | Refills: 0 | Status: SHIPPED | OUTPATIENT
Start: 2024-10-28

## 2024-10-28 NOTE — PROGRESS NOTES
ASSESSMENT & PLAN    Bill was seen today for pain.    Diagnoses and all orders for this visit:    Primary osteoarthritis of left knee  -     diclofenac (VOLTAREN) 75 MG EC tablet; Take 1 tablet (75 mg) by mouth 2 times daily.  -     (PRE-AUTH REQUEST) 48 mg hylan (SYNVISC ONE) injection 48 mg/6mL-ONCE      This issue is acute on chronic and Unchanged.    # Left knee pain: Bill Smith  was seen today for left knee pain . Symptoms had been going on intermittently for a long time, worse in the last 4 days. On examination there are positive findings of joint line tenderness, mild effusion. Xray from 1 month ago shows mild OA.     Likely cause of patient's condition due to OA flare vs degenerative meniscal tear. Gout/pseudogout less likely given lack of significant effusion, warmth, or erythema.  Counseled patient on nature of condition and treatment options.    - had cortisone injection on 9/3/24, so unable to repeat this today    - Workup: we discussed MRI as an option to evaluate for meniscal tear, which is patient's primary concern. Through shared decision making, patient would like to try NSAIDs and HA injection for pain relief first given any meniscal tear is likely degenerative     - Treatment: Activities as tolerated. Resume PT exercises once pain improves    - Medications/Injections:  Oral voltaren 75mg BID x 2-4 weeks  Okay to take tylenol three times daily as needed in addition to these.    Follow-up: In 1-2 weeks for HA injection, sooner if worsening    Shamar Hidalgo MD  [facility] [place]    SUBJECTIVE- Interim History October 28, 2024    Chief Complaint   Patient presents with    Left Knee - Pain       Bill Smith is a 74 year old male who is seen in f/u up for left knee pain. Since last visit with Dr. Jarvis on 9/3/24 patient has increased left knee pain. He reports that pain began after physical therapy on Thursday 10/24. He reports worsening pain through the  weekend and was unable to bear weight on Saturday. He reports anterior knee pain, inferior to the patella. He notes some improvement as he is able to ambulate with a cane today.  - Now ~ 4 days from initial onset (10/24/24)    Worsened by: weight bearing  Better with: rest, TENS units  Treatments tried: corticosteroid injection (most recent date: 9/3/24) - patient cannot recall injection; physical therapy; heat, ice, TENS unit  Associated symptoms:  swelling; popping    The patient is seen by themselves.    Orthopedic/Surgical history: history of right total knee arthroplasty 8/11/23  Social History/Occupation: retired      REVIEW OF SYSTEMS:  Review of Systems    OBJECTIVE:  Ht 1.829 m (6')   Wt 111.1 kg (245 lb)   BMI 33.23 kg/m     General: healthy, alert and in no distress  Skin: no suspicious lesions or rash.  CV: distal perfusion intact   Resp: normal respiratory effort without conversational dyspnea   Psych: normal mood and affect  Gait: antalgic and cane  Neuro: Normal light sensory exam of left lower extremity    LEFT KNEE  Inspection:    Normal alignment; no edema, erythema, or ecchymosis present  Palpation:    Tender about the lateral joint line and medial joint line. Remainder of bony and ligamentous landmarks are nontender.    Small effusion is present    Patellofemoral crepitus is Present  Range of Motion:     00 extension to 1200 flexion  Strength:    5/5    Extensor mechanism intact  Special Tests:    Positive: Thessaly's    Negative: MCL/valgus stress (0 & 30 deg), LCL/varus stress (0 & 30 deg), Lachman's, Kana's      RADIOLOGY:  Final results and radiologist's interpretation, available in the [facility] health record.  Images were reviewed with the patient in the office today.  My personal interpretation of the performed imaging:   Mild OA    EXAM: XR KNEE LEFT 3 VIEWS  LOCATION: [facility]  DATE: 9/3/2024     INDICATION:  Chronic pain of left knee, Chronic pain of left  knee  COMPARISON: None.                                                                      IMPRESSION: The left knee is negative for fracture. There is minimal medial compartment narrowing. Small left knee joint effusion. Vascular calcifications.

## 2024-10-28 NOTE — LETTER
10/28/2024      Bill Smith  [address] [facility]  [place] [address]      Dear Colleague,    Thank you for referring your patient, Bill Smith, to the [facility] [place]. Please see a copy of my visit note below.    ASSESSMENT & PLAN    Bill was seen today for pain.    Diagnoses and all orders for this visit:    Primary osteoarthritis of left knee  -     diclofenac (VOLTAREN) 75 MG EC tablet; Take 1 tablet (75 mg) by mouth 2 times daily.  -     (PRE-AUTH REQUEST) 48 mg hylan (SYNVISC ONE) injection 48 mg/6mL-ONCE      This issue is acute on chronic and Unchanged.    # Left knee pain: Bill Smith  was seen today for left knee pain . Symptoms had been going on intermittently for a long time, worse in the last 4 days. On examination there are positive findings of joint line tenderness, mild effusion. Xray from 1 month ago shows mild OA.     Likely cause of patient's condition due to OA flare vs degenerative meniscal tear. Gout/pseudogout less likely given lack of significant effusion, warmth, or erythema.  Counseled patient on nature of condition and treatment options.    - had cortisone injection on 9/3/24, so unable to repeat this today    - Workup: we discussed MRI as an option to evaluate for meniscal tear, which is patient's primary concern. Through shared decision making, patient would like to try NSAIDs and HA injection for pain relief first given any meniscal tear is likely degenerative     - Treatment: Activities as tolerated. Resume PT exercises once pain improves    - Medications/Injections:  Oral voltaren 75mg BID x 2-4 weeks  Okay to take tylenol three times daily as needed in addition to these.    Follow-up: In 1-2 weeks for HA injection, sooner if worsening    Shamar Hidalgo MD  [facility] [place]    SUBJECTIVE- Interim History October 28, 2024    Chief Complaint   Patient presents with     Left Knee - Pain       Bill MARTINI  Sarah is a 74 year old male who is seen in f/u up for left knee pain. Since last visit with Dr. Jarvis on 9/3/24 patient has increased left knee pain. He reports that pain began after physical therapy on Thursday 10/24. He reports worsening pain through the weekend and was unable to bear weight on Saturday. He reports anterior knee pain, inferior to the patella. He notes some improvement as he is able to ambulate with a cane today.  - Now ~ 4 days from initial onset (10/24/24)    Worsened by: weight bearing  Better with: rest, TENS units  Treatments tried: corticosteroid injection (most recent date: 9/3/24) - patient cannot recall injection; physical therapy; heat, ice, TENS unit  Associated symptoms:  swelling; popping    The patient is seen by themselves.    Orthopedic/Surgical history: history of right total knee arthroplasty 8/11/23  Social History/Occupation: retired      REVIEW OF SYSTEMS:  Review of Systems    OBJECTIVE:  Ht 1.829 m (6')   Wt 111.1 kg (245 lb)   BMI 33.23 kg/m     General: healthy, alert and in no distress  Skin: no suspicious lesions or rash.  CV: distal perfusion intact   Resp: normal respiratory effort without conversational dyspnea   Psych: normal mood and affect  Gait: antalgic and cane  Neuro: Normal light sensory exam of left lower extremity    LEFT KNEE  Inspection:    Normal alignment; no edema, erythema, or ecchymosis present  Palpation:    Tender about the lateral joint line and medial joint line. Remainder of bony and ligamentous landmarks are nontender.    Small effusion is present    Patellofemoral crepitus is Present  Range of Motion:     00 extension to 1200 flexion  Strength:    5/5    Extensor mechanism intact  Special Tests:    Positive: Thessaly's    Negative: MCL/valgus stress (0 & 30 deg), LCL/varus stress (0 & 30 deg), Lachman's, Kana's      RADIOLOGY:  Final results and radiologist's interpretation, available in the [facility] health record.  Images were reviewed  with the patient in the office today.  My personal interpretation of the performed imaging:   Mild OA    EXAM: XR KNEE LEFT 3 VIEWS  LOCATION: [facility]  DATE: 9/3/2024     INDICATION:  Chronic pain of left knee, Chronic pain of left knee  COMPARISON: None.                                                                      IMPRESSION: The left knee is negative for fracture. There is minimal medial compartment narrowing. Small left knee joint effusion. Vascular calcifications.         Again, thank you for allowing me to participate in the care of your patient.        Sincerely,        Sahmar Hidalgo MD

## 2024-10-30 ENCOUNTER — ANCILLARY PROCEDURE (OUTPATIENT)
Dept: GENERAL RADIOLOGY | Facility: CLINIC | Age: 74
End: 2024-10-30
Attending: PODIATRIST
Payer: MEDICARE

## 2024-10-30 ENCOUNTER — OFFICE VISIT (OUTPATIENT)
Dept: PODIATRY | Facility: CLINIC | Age: 74
End: 2024-10-30
Payer: MEDICARE

## 2024-10-30 VITALS
HEIGHT: 72 IN | SYSTOLIC BLOOD PRESSURE: 136 MMHG | WEIGHT: 245 LBS | DIASTOLIC BLOOD PRESSURE: 80 MMHG | BODY MASS INDEX: 33.18 KG/M2

## 2024-10-30 DIAGNOSIS — E11.8 TYPE 2 DIABETES MELLITUS WITH UNSPECIFIED COMPLICATIONS (H): Primary | ICD-10-CM

## 2024-10-30 DIAGNOSIS — M79.673 FOOT PAIN: ICD-10-CM

## 2024-10-30 DIAGNOSIS — G62.9 PERIPHERAL POLYNEUROPATHY: ICD-10-CM

## 2024-10-30 PROCEDURE — 99203 OFFICE O/P NEW LOW 30 MIN: CPT | Performed by: PODIATRIST

## 2024-10-30 PROCEDURE — 73630 X-RAY EXAM OF FOOT: CPT | Mod: TC | Performed by: RADIOLOGY

## 2024-10-30 ASSESSMENT — PAIN SCALES - GENERAL: PAINLEVEL_OUTOF10: NO PAIN (0)

## 2024-10-30 NOTE — PROGRESS NOTES
HPI:  Bill Smith is a 74 year old male who is seen in consultation at the request of Vamshi Uriarte DO    Pt presents for eval of:   (Onset, Location, L/R, Character, Treatments, Injury if yes)    XR Left and Right foot 10/30/2024    DM type 2     Ongoing neuropathy, numbness Left and Right foot. History of gout. Left great and 2nd toe injury after a box TV fell on toes, on 2005. Both toenails avulsed. Now thick, discolored, toenail deformity.    Mostly having trouble managing his nails and what is discovering what his options are.  They are painful.    Retired. Has a winter (Nov - May) home in [facility]        Review of Systems:  Patient denies fever, chills, rash, wound, stiffness, limping, numbness, weakness, heart burn, blood in stool, chest pain with activity, calf pain when walking, shortness of breath with activity, chronic cough, easy bleeding/bruising, swelling of ankles, excessive thirst, fatigue, depression, anxiety.  Patient admits only to symptoms noted in history.     Patient Active Problem List   Diagnosis    History of hernia repair    Parathyroid adenoma    Gout    CARDIOVASCULAR SCREENING; LDL GOAL LESS THAN 130    Insomnia    Anxiety    Essential hypertension with goal blood pressure less than 140/90    Idiopathic chronic gout without tophus, unspecified site    Prediabetes    Complex tear of medial meniscus of right knee as current injury, subsequent encounter    Chondromalacia of right knee    Diabetes mellitus, type 2 (H)    Status post total right knee replacement    Chronic pain of both knees     PAST MEDICAL HISTORY:   Past Medical History:   Diagnosis Date    Acute medial meniscus tear of right knee     Arthritis     Chronic infection     Diabetes mellitus, type 2 (H) 07/25/2023    Essential tremor     hands    Gout     History of hernia repair     HTN     Idiopathic chronic gout without tophus, unspecified site 12/05/2018    Parathyroid adenoma 2007    parathyroidectomy 04/2007     Paresthesia of right leg     Partial arterial retinal occlusion     right eye    Sleep apnea     no CPAP     PAST SURGICAL HISTORY:   Past Surgical History:   Procedure Laterality Date    ABDOMEN SURGERY      Hernia repair left and right side    ARTHROPLASTY KNEE Right 8/11/2023    Procedure: ARTHROPLASTY, KNEE, TOTAL RIGHT;  Surgeon: Miky Jarvis MD;  Location: [facility] OR    ARTHROSCOPY KNEE WITH MEDIAL MENISCECTOMY Right 06/30/2021    Procedure: ARTHROSCOPY, RIGHT KNEE, WITH MEDIAL MENISCECTOMY, MAJOR CHONDROPLASTY.;  Surgeon: Jens Riley MD;  Location: [facility] OR    COLONOSCOPY      HERNIA REPAIR      LAMINECT/DISCECTOMY, CERVICAL  01/01/1999    PARATHYROIDECTOMY  2007    partial    ZZC APPENDECTOMY       MEDICATIONS:   Current Outpatient Medications:     acetaminophen (TYLENOL) 325 MG tablet, Take 3 tablets (975 mg) by mouth every 8 hours, Disp: 100 tablet, Rfl: 0    allopurinol (ZYLOPRIM) 100 MG tablet, TAKE 1 TABLET BY MOUTH TWICE DAILY FOR  GOUT, Disp: 180 tablet, Rfl: 3    ascorbic acid (VITAMIN C) 1000 MG TABS, Take 1,000 mg by mouth 2 times daily., Disp: , Rfl:     carvedilol (COREG) 25 MG tablet, TAKE 1 TABLET BY MOUTH TWICE DAILY WITH MEALS FOR BLOOD PRESSURE, Disp: 180 tablet, Rfl: 3    chlorthalidone (HYGROTON) 25 MG tablet, Take 1 tablet (25 mg) by mouth every morning For blood pressure., Disp: 90 tablet, Rfl: 3    diclofenac (VOLTAREN) 75 MG EC tablet, Take 1 tablet (75 mg) by mouth 2 times daily., Disp: 180 tablet, Rfl: 0    losartan (COZAAR) 100 MG tablet, Take 1 tablet by mouth once daily for blood pressure, Disp: 90 tablet, Rfl: 3    metFORMIN (GLUCOPHAGE) 500 MG tablet, TAKE 1 TABLET BY MOUTH TWICE DAILY WITH MEALS FOR DIABETES, Disp: 180 tablet, Rfl: 3    MULTIPLE VITAMIN PO, Take 1 tablet by mouth daily Stopping 8/4/23 before surgery, Disp: , Rfl:     primidone (MYSOLINE) 50 MG tablet, Take 2 tablets (100 mg) by mouth 2 times daily., Disp: 360 tablet, Rfl: 2    ondansetron  (ZOFRAN ODT) 4 MG ODT tab, Take 1 tablet (4 mg) by mouth every 6 hours as needed for nausea or vomiting (Patient not taking: Reported on 11/13/2023), Disp: 8 tablet, Rfl: 0    Current Facility-Administered Medications:     triamcinolone (KENALOG-40) injection 40 mg, 40 mg, , , , 40 mg at 09/03/24 [id number]  ALLERGIES:    Allergies   Allergen Reactions    [place] Grass Extract Other (See Comments)     Coughing sneezing    Cats     Grass Pollen(K-O-R-T-Swt Sourav) Cough    Lisinopril Cough    Molds & Smuts Other (See Comments)     Coughing sneezing  Coughing sneezing      Shellfish Allergy Other (See Comments)     Causes gout     SOCIAL HISTORY:   Social History     Socioeconomic History    Marital status:      Spouse name: Not on file    Number of children: Not on file    Years of education: Not on file    Highest education level: Not on file   Occupational History    Not on file   Tobacco Use    Smoking status: Never     Passive exposure: Never    Smokeless tobacco: Never   Vaping Use    Vaping status: Never Used   Substance and Sexual Activity    Alcohol use: Not Currently     Comment: rarely    Drug use: No    Sexual activity: Not Currently     Partners: Female   Other Topics Concern    Parent/sibling w/ CABG, MI or angioplasty before 65F 55M? No     Service Yes    Blood Transfusions No    Caffeine Concern No    Occupational Exposure No    Hobby Hazards No    Sleep Concern Yes    Stress Concern Yes     Comment: job related     Weight Concern No    Special Diet No    Back Care No    Exercise Yes    Bike Helmet No    Seat Belt Yes    Self-Exams No   Social History Narrative    Not on file     Social Drivers of Health     Financial Resource Strain: Low Risk  (10/17/2024)    Financial Resource Strain     Within the past 12 months, have you or your family members you live with been unable to get utilities (heat, electricity) when it was really needed?: No   Food Insecurity: Low Risk  (10/17/2024)    Food  Insecurity     Within the past 12 months, did you worry that your food would run out before you got money to buy more?: No     Within the past 12 months, did the food you bought just not last and you didn t have money to get more?: No   Transportation Needs: Low Risk  (10/17/2024)    Transportation Needs     Within the past 12 months, has lack of transportation kept you from medical appointments, getting your medicines, non-medical meetings or appointments, work, or from getting things that you need?: No   Physical Activity: Insufficiently Active (10/17/2024)    Exercise Vital Sign     Days of Exercise per Week: 3 days     Minutes of Exercise per Session: 20 min   Stress: No Stress Concern Present (10/17/2024)    [employer] [place] [employer] - Occupational Stress Questionnaire     Feeling of Stress : Not at all   Social Connections: Unknown (10/17/2024)    Social Connection and Isolation Panel [NHANES]     Frequency of Communication with Friends and Family: Not on file     Frequency of Social Gatherings with Friends and Family: Once a week     Attends Quaker Services: Not on file     Active Member of Clubs or Organizations: Not on file     Attends Club or Organization Meetings: Not on file     Marital Status: Not on file   Interpersonal Safety: Not on file   Housing Stability: Low Risk  (10/17/2024)    Housing Stability     Do you have housing? : Yes     Are you worried about losing your housing?: No     FAMILY HISTORY:   Family History   Problem Relation Age of Onset    Prostate Cancer Father     Alzheimer Disease Paternal Grandmother     Hypertension Maternal Uncle         AGE 60'S     EXAM:Vitals: /80 (BP Location: Left arm, Patient Position: Sitting, Cuff Size: Adult Large)   Ht 1.829 m (6')   Wt 111.1 kg (245 lb)   BMI 33.23 kg/m    BMI= Body mass index is 33.23 kg/m .    General appearance: Patient is alert and fully cooperative with history & exam.  No sign of distress is noted during  the visit.     Psychiatric: Affect is pleasant & appropriate.  Patient appears motivated to improve health.     Respiratory: Breathing is regular & unlabored while sitting.     HEENT: Hearing is intact to spoken word.  Speech is clear.  No gross evidence of visual impairment that would impact ambulation.     Vascular: DP 1/4 & PT 1/4 left & right.  CFT delayed with dependent rubor noted about the digits.  Diminished hair growth distal to mid tibia and no hair about the foot and toes.  Temperature changes noted, warm to cool proximal to distal.  Hemosiderin pigmentation noted with multiple varicosities legs and feet bilateral. Generalized edema bilateral legs and feet.  Pt denies claudication history.       Neurologic: Normal plantar response bilateral.  Loss of protective threshold plus 0/10 applications of a 5.07 monofilament.  Pt admits no burning and paraesthesias about the feet and toes with palpation.     Dermatologic: All 10 nails are thickened, elongated, discolored and painful with palpation and debridement.  Both first and second toenails on the left foot are severely dystrophic forming pincer nails.  Diminished texture turgor and tone about the integument.  Skin is thin & shiny.  No pre ulcerative hyperkeratosis noted.  No abscess or full thickness ulcerations noted.     Musculoskeletal: Patient is ambulatory without assistive device or brace.  There is semi reducible contracture of the lesser digits.    Hemoglobin A1C (%)   Date Value   10/22/2024 6.7 (H)   08/07/2024 7.0 (H)   10/06/2023 6.2 (H)   08/08/2023 6.8 (H)   10/06/2022 6.7 (H)   10/26/2021 6.6 (H)   10/22/2021 6.5 (H)   06/17/2021 6.3 (H)   02/26/2021 7.5 (H)   05/29/2019 6.3 (H)     Creatinine (mg/dL)   Date Value   10/22/2024 0.71   10/06/2023 0.83   08/08/2023 0.77   10/06/2022 0.77   06/17/2021 0.78   02/26/2021 0.74   08/11/2020 0.89   12/26/2018 0.81   12/05/2018 0.76   11/28/2012 0.95     ASSESSMENT:       ICD-10-CM    1. Type 2 diabetes  mellitus with unspecified complications (H)  E11.8 Orthopedic  Referral      2. Peripheral polyneuropathy  G62.9 Orthopedic  Referral        PLAN:    10/30/2024  All 10 nails were debrided with a nail nipper to demonstrate appropriate technique  We discussed risk factors and preventive measures.    We discussed appropriate hygiene, shoe gear, daily foot exam, and reinforced management of weight, diet, activity goals and HA1C goal for diabetic patients.    Dispensed written foot care instructions.    All questions were answered to their satisfaction.    RTC as needed with questions or concerns.      Justin Campbell DPM

## 2024-10-30 NOTE — PATIENT INSTRUCTIONS
".Nail Debridement    A high quality instrument makes trimming toenails MUCH easier.  Search [employer] for any 5\" nail nipper manufactured by reliable brands such as Miltex, Integra or Jarit as these quality instruments will help manage difficult nails more effectively and comfortably. We use Miltex -SS.  A physician is not necessary to trim nails even if you are taking blood thinners or are diabetic.  Your family or care givers may help manage your toenails.      Trim or sand the nails once weekly.  Do not wait until they are long and painful or trimming will become too difficult and painful and will increase your risk of complications or infection.  A course file or 120 grit sandpaper on a sanding block can be helpful.  For very thick nails many people prefer battery operated mathews such as an Amope', Personal Pedi and Emjoi for regular use or heavy painful callouses or thick toenails.    Trim or skive any portion of nail that is thick, loose, crumbling, or not well attached. Do not tear the nail away, but rather cut them with a nail nipperor sand or sand them down.  You may follow up with your Podiatric Physician if you have pain, bleeding, infection, questions or other concerns.      You may also contact the following Registered Nurses for further help with nail debridement and minor hygiene concnerns.  They may come to your home or meet them at their clinic to trim your toenails and soak your feet, as well as monitor for any complications that would require evaluation by a Physician.      [facility]  Autumn Hernandez RN  Phone & text [phone]    [facility]  Darcie Jensen RN  Office [phone]    [email or web address]  [phone]  [email or web address]    For up to date list and to find foot care nurses in other communities visit [employer]es Association website:  afcna.org.     Calluses, Corns, IPKs, Porokeratosis    When there is " excessive friction or pressure on the skin, the body responds by making the skin thicker.  While this may protect the deeper structures, the thickened skin can take up more space and thus increase pressure over a bony prominence or become an open sore or skin ulcer as this skin becomes less flexible.    Flat, diffuse thickening are simple calluses and they are usually caused by friction.  Often these are the result of rubbing on a shoe or going barefoot.    Calluses with a central core between the toes are called corns.  These often result from prominent joints on adjacent toes rubbing together.  Theses are often a symptom of bone malalignment and will usually recur unless the underlying bones are addressed.    Many of these lesions can be kept comfortable with routine maintenance. This consists of filing them with a Ped Egg, callus file, or 120 grit sandpaper on a block, every day during your bath or shower.  Most people prefer battery operated mathews such as an Amope', Personal Pedi and Emjoi for regular use or heavy painful callouses.  Heavy creams or ointments can be applied 1-2 times every day to keep them soft. Toe spacers can be used for corns, gel pads can be used for other lesions on the bottom of the foot. If there is a deformity noted, such as a prominent bone, often this can be addressed to minimize recurrence. However, sometimes the pressure and lesion simply migrates to another spot after surgery, so it is not a guaranteed cure.     If you have severe callouses and cracking, you may apply heavy ointments that you scoop up such as Cetaphil cream, Eucerin, Aquaphor or Vaseline.  Be sure to obtain cream or ointment in these brands and not lotion (lotion is water based and not durable enough for feet). For more aggressive help apply heavy creams or ointment under occlusive dressings such as Saran Wrap or Jelly Feet while sleeping.   Jelly Feet can be obtained at [email or web address].     To be successful  with managing hyperkeratotic skin, you must manage hygiene daily.  Apply the cream once or twice EVERY day.  At your bath or shower time is the easiest time to work on this when skin is most soft.  There is no medical or surgical treatment that will absolutely eliminate many of these symptoms.      Ped[employer]ix is a reliable source for all sorts of foot pads, cushions, or interdigital spacers and foot appliances. Go to www.pedifix.[employer] or request a catalog at 1-441-PostRank.        Please call with any additional questions.

## 2024-10-30 NOTE — LETTER
10/30/2024      Bill Smith  [address] [address]      Dear Colleague,    Thank you for referring your patient, Bill Smiht, to the [facility]. Please see a copy of my visit note below.    HPI:  Bill Smith is a 74 year old male who is seen in consultation at the request of Vamshi Uriarte DO    Pt presents for eval of:   (Onset, Location, L/R, Character, Treatments, Injury if yes)    XR Left and Right foot 10/29/2024    DM type 2     Ongoing neuropathy, numbness Left and Right foot. History of gout. Left great and 2nd toe injury after a box TV fell on toes, on 2005. Both toenails avulsed. Now thick, discolored, toenail deformity.    Mostly having trouble managing his nails and what is discovering what his options are.  They are painful.    Retired. Has a winter (Nov - May) home in [place]        Review of Systems:  Patient denies fever, chills, rash, wound, stiffness, limping, numbness, weakness, heart burn, blood in stool, chest pain with activity, calf pain when walking, shortness of breath with activity, chronic cough, easy bleeding/bruising, swelling of ankles, excessive thirst, fatigue, depression, anxiety.  Patient admits only to symptoms noted in history.     Patient Active Problem List   Diagnosis     History of hernia repair     Parathyroid adenoma     Gout     CARDIOVASCULAR SCREENING; LDL GOAL LESS THAN 130     Insomnia     Anxiety     Essential hypertension with goal blood pressure less than 140/90     Idiopathic chronic gout without tophus, unspecified site     Prediabetes     Complex tear of medial meniscus of right knee as current injury, subsequent encounter     Chondromalacia of right knee     Diabetes mellitus, type 2 (H)     Status post total right knee replacement     Chronic pain of both knees     PAST MEDICAL HISTORY:   Past Medical History:   Diagnosis Date     Acute medial meniscus tear of right knee      Arthritis      Chronic  infection      Diabetes mellitus, type 2 (H) 07/25/2023     Essential tremor     hands     Gout      History of hernia repair      HTN      Idiopathic chronic gout without tophus, unspecified site 12/05/2018     Parathyroid adenoma 2007    parathyroidectomy 04/2007     Paresthesia of right leg      Partial arterial retinal occlusion     right eye     Sleep apnea     no CPAP     PAST SURGICAL HISTORY:   Past Surgical History:   Procedure Laterality Date     ABDOMEN SURGERY      Hernia repair left and right side     ARTHROPLASTY KNEE Right 8/11/2023    Procedure: ARTHROPLASTY, KNEE, TOTAL RIGHT;  Surgeon: Miky Jarvis MD;  Location: [facility]     ARTHROSCOPY KNEE WITH MEDIAL MENISCECTOMY Right 06/30/2021    Procedure: ARTHROSCOPY, RIGHT KNEE, WITH MEDIAL MENISCECTOMY, MAJOR CHONDROPLASTY.;  Surgeon: Jens Riley MD;  Location: [facility] OR     COLONOSCOPY       HERNIA REPAIR       LAMINECT/DISCECTOMY, CERVICAL  01/01/1999     PARATHYROIDECTOMY  2007    partial     ZZC APPENDECTOMY       MEDICATIONS:   Current Outpatient Medications:      acetaminophen (TYLENOL) 325 MG tablet, Take 3 tablets (975 mg) by mouth every 8 hours, Disp: 100 tablet, Rfl: 0     allopurinol (ZYLOPRIM) 100 MG tablet, TAKE 1 TABLET BY MOUTH TWICE DAILY FOR  GOUT, Disp: 180 tablet, Rfl: 3     ascorbic acid (VITAMIN C) 1000 MG TABS, Take 1,000 mg by mouth 2 times daily., Disp: , Rfl:      carvedilol (COREG) 25 MG tablet, TAKE 1 TABLET BY MOUTH TWICE DAILY WITH MEALS FOR BLOOD PRESSURE, Disp: 180 tablet, Rfl: 3     chlorthalidone (HYGROTON) 25 MG tablet, Take 1 tablet (25 mg) by mouth every morning For blood pressure., Disp: 90 tablet, Rfl: 3     diclofenac (VOLTAREN) 75 MG EC tablet, Take 1 tablet (75 mg) by mouth 2 times daily., Disp: 180 tablet, Rfl: 0     losartan (COZAAR) 100 MG tablet, Take 1 tablet by mouth once daily for blood pressure, Disp: 90 tablet, Rfl: 3     metFORMIN (GLUCOPHAGE) 500 MG tablet, TAKE 1 TABLET BY MOUTH  TWICE DAILY WITH MEALS FOR DIABETES, Disp: 180 tablet, Rfl: 3     MULTIPLE VITAMIN PO, Take 1 tablet by mouth daily Stopping 8/4/23 before surgery, Disp: , Rfl:      primidone (MYSOLINE) 50 MG tablet, Take 2 tablets (100 mg) by mouth 2 times daily., Disp: 360 tablet, Rfl: 2     ondansetron (ZOFRAN ODT) 4 MG ODT tab, Take 1 tablet (4 mg) by mouth every 6 hours as needed for nausea or vomiting (Patient not taking: Reported on 11/13/2023), Disp: 8 tablet, Rfl: 0    Current Facility-Administered Medications:      triamcinolone (KENALOG-40) injection 40 mg, 40 mg, , , , 40 mg at 09/03/24 1459  ALLERGIES:    Allergies   Allergen Reactions     [place] Grass Extract Other (See Comments)     Coughing sneezing     Cats      Grass Pollen(K-O-R-T-Swt Sourav) Cough     Lisinopril Cough     Molds & Smuts Other (See Comments)     Coughing sneezing  Coughing sneezing       Shellfish Allergy Other (See Comments)     Causes gout     SOCIAL HISTORY:   Social History     Socioeconomic History     Marital status:      Spouse name: Not on file     Number of children: Not on file     Years of education: Not on file     Highest education level: Not on file   Occupational History     Not on file   Tobacco Use     Smoking status: Never     Passive exposure: Never     Smokeless tobacco: Never   Vaping Use     Vaping status: Never Used   Substance and Sexual Activity     Alcohol use: Not Currently     Comment: rarely     Drug use: No     Sexual activity: Not Currently     Partners: Female   Other Topics Concern     Parent/sibling w/ CABG, MI or angioplasty before 65F 55M? No      Service Yes     Blood Transfusions No     Caffeine Concern No     Occupational Exposure No     Hobby Hazards No     Sleep Concern Yes     Stress Concern Yes     Comment: job related      Weight Concern No     Special Diet No     Back Care No     Exercise Yes     Bike Helmet No     Seat Belt Yes     Self-Exams No   Social History Narrative     Not on file      Social Drivers of Health     Financial Resource Strain: Low Risk  (10/17/2024)    Financial Resource Strain      Within the past 12 months, have you or your family members you live with been unable to get utilities (heat, electricity) when it was really needed?: No   Food Insecurity: Low Risk  (10/17/2024)    Food Insecurity      Within the past 12 months, did you worry that your food would run out before you got money to buy more?: No      Within the past 12 months, did the food you bought just not last and you didn t have money to get more?: No   Transportation Needs: Low Risk  (10/17/2024)    Transportation Needs      Within the past 12 months, has lack of transportation kept you from medical appointments, getting your medicines, non-medical meetings or appointments, work, or from getting things that you need?: No   Physical Activity: Insufficiently Active (10/17/2024)    Exercise Vital Sign      Days of Exercise per Week: 3 days      Minutes of Exercise per Session: 20 min   Stress: No Stress Concern Present (10/17/2024)    [employer] [place] [employer] - Occupational Stress Questionnaire      Feeling of Stress : Not at all   Social Connections: Unknown (10/17/2024)    Social Connection and Isolation Panel [NHANES]      Frequency of Communication with Friends and Family: Not on file      Frequency of Social Gatherings with Friends and Family: Once a week      Attends Amish Services: Not on file      Active Member of Clubs or Organizations: Not on file      Attends Club or Organization Meetings: Not on file      Marital Status: Not on file   Interpersonal Safety: Not on file   Housing Stability: Low Risk  (10/17/2024)    Housing Stability      Do you have housing? : Yes      Are you worried about losing your housing?: No     FAMILY HISTORY:   Family History   Problem Relation Age of Onset     Prostate Cancer Father      Alzheimer Disease Paternal Grandmother      Hypertension Maternal Uncle          AGE 60'S     EXAM:Vitals: /80 (BP Location: Left arm, Patient Position: Sitting, Cuff Size: Adult Large)   Ht 1.829 m (6')   Wt 111.1 kg (245 lb)   BMI 33.23 kg/m    BMI= Body mass index is 33.23 kg/m .    General appearance: Patient is alert and fully cooperative with history & exam.  No sign of distress is noted during the visit.     Psychiatric: Affect is pleasant & appropriate.  Patient appears motivated to improve health.     Respiratory: Breathing is regular & unlabored while sitting.     HEENT: Hearing is intact to spoken word.  Speech is clear.  No gross evidence of visual impairment that would impact ambulation.     Vascular: DP 1/4 & PT 1/4 left & right.  CFT delayed with dependent rubor noted about the digits.  Diminished hair growth distal to mid tibia and no hair about the foot and toes.  Temperature changes noted, warm to cool proximal to distal.  Hemosiderin pigmentation noted with multiple varicosities legs and feet bilateral. Generalized edema bilateral legs and feet.  Pt denies claudication history.       Neurologic: Normal plantar response bilateral.  Loss of protective threshold plus 0/10 applications of a 5.07 monofilament.  Pt admits no burning and paraesthesias about the feet and toes with palpation.     Dermatologic: All 10 nails are thickened, elongated, discolored and painful with palpation and debridement.  Both first and second toenails on the left foot are severely dystrophic forming pincer nails.  Diminished texture turgor and tone about the integument.  Skin is thin & shiny.  No pre ulcerative hyperkeratosis noted.  No abscess or full thickness ulcerations noted.     Musculoskeletal: Patient is ambulatory without assistive device or brace.  There is semi reducible contracture of the lesser digits.    Hemoglobin A1C (%)   Date Value   10/22/2024 6.7 (H)   08/07/2024 7.0 (H)   10/06/2023 6.2 (H)   08/08/2023 6.8 (H)   10/06/2022 6.7 (H)   10/26/2021 6.6 (H)   10/22/2021 6.5  (H)   06/17/2021 6.3 (H)   02/26/2021 7.5 (H)   05/29/2019 6.3 (H)     Creatinine (mg/dL)   Date Value   10/22/2024 0.71   10/06/2023 0.83   08/08/2023 0.77   10/06/2022 0.77   06/17/2021 0.78   02/26/2021 0.74   08/11/2020 0.89   12/26/2018 0.81   12/05/2018 0.76   11/28/2012 0.95     ASSESSMENT:       ICD-10-CM    1. Type 2 diabetes mellitus with unspecified complications (H)  E11.8 Orthopedic  Referral      2. Peripheral polyneuropathy  G62.9 Orthopedic  Referral        PLAN:    10/30/2024  All 10 nails were debrided with a nail nipper to demonstrate appropriate technique  We discussed risk factors and preventive measures.    We discussed appropriate hygiene, shoe gear, daily foot exam, and reinforced management of weight, diet, activity goals and HA1C goal for diabetic patients.    Dispensed written foot care instructions.    All questions were answered to their satisfaction.    RTC as needed with questions or concerns.      Justin Campbell DPM      Again, thank you for allowing me to participate in the care of your patient.        Sincerely,        Justin Campbell DPM

## 2024-11-04 ENCOUNTER — OFFICE VISIT (OUTPATIENT)
Dept: ORTHOPEDICS | Facility: CLINIC | Age: 74
End: 2024-11-04
Payer: MEDICARE

## 2024-11-04 DIAGNOSIS — M17.12 PRIMARY OSTEOARTHRITIS OF LEFT KNEE: Primary | ICD-10-CM

## 2024-11-04 PROCEDURE — 20611 DRAIN/INJ JOINT/BURSA W/US: CPT | Mod: LT | Performed by: STUDENT IN AN ORGANIZED HEALTH CARE EDUCATION/TRAINING PROGRAM

## 2024-11-04 PROCEDURE — 99207 PR DROP WITH A PROCEDURE: CPT | Performed by: STUDENT IN AN ORGANIZED HEALTH CARE EDUCATION/TRAINING PROGRAM

## 2024-11-04 NOTE — LETTER
11/4/2024      Bill Smith  [address]  [place] [address]      Dear Colleague,    Thank you for referring your patient, Bill Smith, to the [facility]. Please see a copy of my visit note below.    ASSESSMENT & PLAN    Bill was seen today for follow up.    Diagnoses and all orders for this visit:    Primary osteoarthritis of left knee    Other orders  -     Large Joint Injection/Arthocentesis: L knee joint        Bill Smith was seen today in request for a L knee HA injection for previously diagnosed OA.    Patient was independently assessed by me and was deemed appropriate for this procedure. Risks and benefits were discussed with good understanding including, but not limited to the risks of bleeding, reaction to the medication, infection,  and the possibility of the treatment being ineffective. The patient tolerated the procedure well with no complications.    After visit care instructions were discussed in person and provided in AVS.    Follow-up: with referring provider as planned. Could repeat injection after 6 months if injection was helpful but pain returns. Could repeat cortisone 3 months after last cortisone    Shamar Hidalgo MD  [facility]    SUBJECTIVE- November 4, 2024    Chief Complaint   Patient presents with     Left Knee - Follow Up     SynviscOne injection       Bill Smith is a 74 year old male who is seen for left knee SynviscOne injection.    Referred by: Shamar Hidalgo MD  Previous Diagnosis: Primary osteoarthritis of left knee    Previous injections: left knee steroid injection (most recent date: 9/3/24)  Other treatments tried: physical therapy; heat, ice, TENS unit       REVIEW OF SYSTEMS:  Negative other than the symptoms noted above in the HPI.      OBJECTIVE:  There were no vitals taken for this visit.   General: healthy, alert and in no distress  Skin: no suspicious lesions  or rash noted near area of injection   CV: distal perfusion intact near area of injection  Neuro: Normal light sensory exam near area of injection    RADIOLOGY:  I independently reviewed and agree with the final results and radiologist's interpretation from the imaging relevant to today's visit, available in the [facility] health record.       Hemoglobin A1C   Date Value Ref Range Status   10/22/2024 6.7 (H) 0.0 - 5.6 % Final     Comment:     Normal <5.7%   Prediabetes 5.7-6.4%    Diabetes 6.5% or higher     Note: Adopted from ADA consensus guidelines.   06/17/2021 6.3 (H) 0 - 5.6 % Final     Comment:     Normal <5.7% Prediabetes 5.7-6.4%  Diabetes 6.5% or higher - adopted from ADA   consensus guidelines.       Patient on blood thinners: No    Large Joint Injection/Arthocentesis: L knee joint    Date/Time: 11/4/2024 4:46 PM    Performed by: Shamar Hidalgo MD  Authorized by: Shamar Hidalgo MD    Indications:  Pain and osteoarthritis  Needle Size:  21 G  Guidance: ultrasound    Approach:  Superolateral  Location:  Knee      Medications:  48 mg hylan 48 MG/6ML  Aspirate amount (mL):  40  Aspirate:  Yellow  Outcome:  Tolerated well, no immediate complications  Procedure discussed: discussed risks, benefits, and alternatives    Consent Given by:  Patient  Timeout: timeout called immediately prior to procedure    Prep: patient was prepped and draped in usual sterile fashion     Patient tolerated L knee HA injection and aspiration. Ultrasound guidance was required to ensure correct needle placement for injection, ensure maximal aspiration, and avoid vital surrounding structures. Ultrasound guided images were permanently stored. Aftercare instructions given to patient. Plan to follow-up as above.     Shamar Hidalgo MD            Again, thank you for allowing me to participate in the care of your patient.        Sincerely,        Shamar Hidalgo MD

## 2024-11-04 NOTE — PROGRESS NOTES
ASSESSMENT & PLAN    Bill was seen today for follow up.    Diagnoses and all orders for this visit:    Primary osteoarthritis of left knee    Other orders  -     Large Joint Injection/Arthocentesis: L knee joint        Bill Smith was seen today in request for a L knee HA injection for previously diagnosed OA.    Patient was independently assessed by me and was deemed appropriate for this procedure. Risks and benefits were discussed with good understanding including, but not limited to the risks of bleeding, reaction to the medication, infection,  and the possibility of the treatment being ineffective. The patient tolerated the procedure well with no complications.    After visit care instructions were discussed in person and provided in AVS.    Follow-up: with referring provider as planned. Could repeat injection after 6 months if injection was helpful but pain returns. Could repeat cortisone 3 months after last cortisone    Shamar Hidalgo MD  [facility]    SUBJECTIVE- November 4, 2024    Chief Complaint   Patient presents with    Left Knee - Follow Up     SynviscOne injection       Bill Smith is a 74 year old male who is seen for left knee SynviscOne injection.    Referred by: Shamar Hidalgo MD  Previous Diagnosis: Primary osteoarthritis of left knee    Previous injections: left knee steroid injection (most recent date: 9/3/24)  Other treatments tried: physical therapy; heat, ice, TENS unit       REVIEW OF SYSTEMS:  Negative other than the symptoms noted above in the HPI.      OBJECTIVE:  There were no vitals taken for this visit.   General: healthy, alert and in no distress  Skin: no suspicious lesions or rash noted near area of injection   CV: distal perfusion intact near area of injection  Neuro: Normal light sensory exam near area of injection    RADIOLOGY:  I independently reviewed and agree with the final results and radiologist's interpretation  from the imaging relevant to today's visit, available in the [facility] health record.       Hemoglobin A1C   Date Value Ref Range Status   10/22/2024 6.7 (H) 0.0 - 5.6 % Final     Comment:     Normal <5.7%   Prediabetes 5.7-6.4%    Diabetes 6.5% or higher     Note: Adopted from ADA consensus guidelines.   06/17/2021 6.3 (H) 0 - 5.6 % Final     Comment:     Normal <5.7% Prediabetes 5.7-6.4%  Diabetes 6.5% or higher - adopted from ADA   consensus guidelines.       Patient on blood thinners: No    Large Joint Injection/Arthocentesis: L knee joint    Date/Time: 11/4/2024 4:46 PM    Performed by: Shamar Hidalgo MD  Authorized by: Shamar Hidalgo MD    Indications:  Pain and osteoarthritis  Needle Size:  21 G  Guidance: ultrasound    Approach:  Superolateral  Location:  Knee      Medications:  48 mg hylan 48 MG/6ML  Aspirate amount (mL):  40  Aspirate:  Yellow  Outcome:  Tolerated well, no immediate complications  Procedure discussed: discussed risks, benefits, and alternatives    Consent Given by:  Patient  Timeout: timeout called immediately prior to procedure    Prep: patient was prepped and draped in usual sterile fashion     Patient tolerated L knee HA injection and aspiration. Ultrasound guidance was required to ensure correct needle placement for injection, ensure maximal aspiration, and avoid vital surrounding structures. Ultrasound guided images were permanently stored. Aftercare instructions given to patient. Plan to follow-up as above.     Shamar Hidalgo MD

## 2024-11-04 NOTE — PATIENT INSTRUCTIONS
[facility] Injection Discharge Instructions    Procedure: left knee hyaluronic acid (gel) injection with aspiration    You may shower, however avoid swimming, tub baths or hot tubs for 24 hours following your procedure  You may have a mild to moderate increase in pain for several days following the injection.  It may take up to 30 days for the medication to start working although you may feel the effect as early as a few days after the procedure.  You may use ice packs for 10-15 minutes, 3 to 4 times a day at the injection site for comfort  You may use anti-inflammatory medications (such as Ibuprofen or Aleve or Advil) or Tylenol for pain control if necessary    If you experience any of the following, call [facility] @ [phone] or [phone]  - Fever over 100 degree F  - Swelling, bleeding, redness, drainage, warmth at the injection site  - New or worsening pain

## 2025-02-02 ENCOUNTER — HEALTH MAINTENANCE LETTER (OUTPATIENT)
Age: 75
End: 2025-02-02

## 2025-04-19 ENCOUNTER — HEALTH MAINTENANCE LETTER (OUTPATIENT)
Age: 75
End: 2025-04-19

## 2025-05-10 ENCOUNTER — HEALTH MAINTENANCE LETTER (OUTPATIENT)
Age: 75
End: 2025-05-10

## 2025-05-30 ENCOUNTER — RESULTS FOLLOW-UP (OUTPATIENT)
Dept: FAMILY MEDICINE | Facility: OTHER | Age: 75
End: 2025-05-30

## 2025-07-07 ENCOUNTER — NURSE TRIAGE (OUTPATIENT)
Dept: FAMILY MEDICINE | Facility: OTHER | Age: 75
End: 2025-07-07
Payer: MEDICARE

## 2025-07-07 NOTE — TELEPHONE ENCOUNTER
Order/Referral Request    Who is requesting: Patient     Orders being requested: Imaging     Reason service is needed/diagnosis: Knee pain - possible torn meniscus     When are orders needed by: asap     Has this been discussed with Provider: No    Does patient have a preference on a Group/Provider/Facility? [facility]     Does patient have an appointment scheduled?: No    Where to send orders: Place orders within Epic    Could we send this information to you in [facility] or would you prefer to receive a phone call?:   Patient would prefer a phone call   Okay to leave a detailed message?: Yes at Cell number on file:    Telephone Information:   Mobile [phone]   Mobile Not on file.

## 2025-07-08 NOTE — TELEPHONE ENCOUNTER
Nurse Triage SBAR    Is this a 2nd Level Triage? NO    Situation: patient calling in with complaints of left knee pain. Chronic and gradual onset. No acute injury. Patient states he has had his right knee replaced and now is sure his left one needs to be done. He has trouble bending it fully and is using a cane to walk due to pain. Denies redness or warmth. Denies fever. Denies calf or thigh pain. Patient is using a lidocaine patch and ace wraps.  Rates pain 6-7/10, 9.5/10 when moving knee.      Background: knee pain. Chronic. Worsening.     Assessment: advised patient to be seen within 3 days.     Protocol Recommended Disposition:   See in Office Within 3 Days    Recommendation: patient verbalized understanding and is agreeable. Not able to come in today due to another appointment.  Patient scheduled tomorrow per request. RN reviewed red flag symptoms with patient and when to seek emergency care.           Does the patient meet one of the following criteria for ADS visit consideration? 16+ years old, with an [facility] PCP     TIP  Providers, please consider if this condition is appropriate for management at one of our Acute and Diagnostic Services sites.     If patient is a good candidate, please use dotphrase <dot>triageresponse and select Refer to ADS to document.      Reason for Disposition   MODERATE pain (e.g., symptoms interfere with work or school, limping) and present > 3 days    Additional Information   Negative: Sounds like a life-threatening emergency to the triager   Negative: Followed a knee injury   Negative: Swollen knee joint and fever   Negative: Patient sounds very sick or weak to the triager   Negative: Can't move swollen joint at all   Negative: Thigh or calf pain and only 1 side and present > 1 hour   Negative: Thigh or calf swelling and only 1 side   Negative: SEVERE pain (e.g., excruciating, unable to walk)   Negative: Very swollen knee joint   Negative: Painful rash with multiple small blisters  grouped together (i.e., dermatomal distribution or 'band' or 'stripe')   Negative: Looks like a boil, infected sore, deep ulcer, or other infected rash (spreading redness, pus)    Protocols used: Knee Pain-A-OH

## 2025-07-09 ENCOUNTER — ANCILLARY PROCEDURE (OUTPATIENT)
Dept: GENERAL RADIOLOGY | Facility: OTHER | Age: 75
End: 2025-07-09
Attending: PHYSICIAN ASSISTANT
Payer: MEDICARE

## 2025-07-09 ENCOUNTER — OFFICE VISIT (OUTPATIENT)
Dept: FAMILY MEDICINE | Facility: OTHER | Age: 75
End: 2025-07-09
Payer: MEDICARE

## 2025-07-09 VITALS
HEIGHT: 72 IN | TEMPERATURE: 97.9 F | DIASTOLIC BLOOD PRESSURE: 70 MMHG | WEIGHT: 237 LBS | RESPIRATION RATE: 20 BRPM | OXYGEN SATURATION: 96 % | BODY MASS INDEX: 32.1 KG/M2 | HEART RATE: 74 BPM | SYSTOLIC BLOOD PRESSURE: 122 MMHG

## 2025-07-09 DIAGNOSIS — E11.65 TYPE 2 DIABETES MELLITUS WITH HYPERGLYCEMIA, WITHOUT LONG-TERM CURRENT USE OF INSULIN (H): ICD-10-CM

## 2025-07-09 DIAGNOSIS — M17.12 PRIMARY OSTEOARTHRITIS OF LEFT KNEE: ICD-10-CM

## 2025-07-09 DIAGNOSIS — I10 ESSENTIAL HYPERTENSION WITH GOAL BLOOD PRESSURE LESS THAN 140/90: ICD-10-CM

## 2025-07-09 DIAGNOSIS — Z01.818 PREOP GENERAL PHYSICAL EXAM: Primary | ICD-10-CM

## 2025-07-09 DIAGNOSIS — H26.9 CATARACT OF RIGHT EYE, UNSPECIFIED CATARACT TYPE: ICD-10-CM

## 2025-07-09 PROCEDURE — 99214 OFFICE O/P EST MOD 30 MIN: CPT | Performed by: PHYSICIAN ASSISTANT

## 2025-07-09 PROCEDURE — 3074F SYST BP LT 130 MM HG: CPT | Performed by: PHYSICIAN ASSISTANT

## 2025-07-09 PROCEDURE — 73562 X-RAY EXAM OF KNEE 3: CPT | Mod: TC | Performed by: RADIOLOGY

## 2025-07-09 PROCEDURE — 3078F DIAST BP <80 MM HG: CPT | Performed by: PHYSICIAN ASSISTANT

## 2025-07-09 PROCEDURE — 1125F AMNT PAIN NOTED PAIN PRSNT: CPT | Performed by: PHYSICIAN ASSISTANT

## 2025-07-09 RX ORDER — DICLOFENAC SODIUM 75 MG/1
75 TABLET, DELAYED RELEASE ORAL 2 TIMES DAILY
Qty: 180 TABLET | Refills: 0 | Status: SHIPPED | OUTPATIENT
Start: 2025-07-09

## 2025-07-09 ASSESSMENT — PATIENT HEALTH QUESTIONNAIRE - PHQ9
5. POOR APPETITE OR OVEREATING: NOT AT ALL
SUM OF ALL RESPONSES TO PHQ QUESTIONS 1-9: 3

## 2025-07-09 ASSESSMENT — ANXIETY QUESTIONNAIRES
IF YOU CHECKED OFF ANY PROBLEMS ON THIS QUESTIONNAIRE, HOW DIFFICULT HAVE THESE PROBLEMS MADE IT FOR YOU TO DO YOUR WORK, TAKE CARE OF THINGS AT HOME, OR GET ALONG WITH OTHER PEOPLE: NOT DIFFICULT AT ALL
GAD7 TOTAL SCORE: 0
2. NOT BEING ABLE TO STOP OR CONTROL WORRYING: NOT AT ALL
7. FEELING AFRAID AS IF SOMETHING AWFUL MIGHT HAPPEN: NOT AT ALL
3. WORRYING TOO MUCH ABOUT DIFFERENT THINGS: NOT AT ALL
5. BEING SO RESTLESS THAT IT IS HARD TO SIT STILL: NOT AT ALL
6. BECOMING EASILY ANNOYED OR IRRITABLE: NOT AT ALL
1. FEELING NERVOUS, ANXIOUS, OR ON EDGE: NOT AT ALL
GAD7 TOTAL SCORE: 0

## 2025-07-09 ASSESSMENT — PAIN SCALES - GENERAL: PAINLEVEL_OUTOF10: SEVERE PAIN (8)

## 2025-07-09 NOTE — PROGRESS NOTES
{PROVIDER CHARTING PREFERENCE:036702}    Kylee Khan is a 74 year old, presenting for the following health issues:  Knee Pain (Left )      7/9/2025     3:02 PM   Additional Questions   Roomed by Mayelin WORRELL     Knee Pain    History of Present Illness       Reason for visit:  Knee pain   He is taking medications regularly.        {MA/LPN/RN Pre-Provider Visit Orders- hCG/UA/Strep (Optional):570099}  Pain History:  When did you first notice your pain? Last 2 months have been worse   Have you seen this provider for your pain in the past? No   Where in your body do your have pain? Left Knee pain  Are you seeing anyone else for your pain? Jake mejia he saw was given knee injections back in November   What makes your pain better? Ice, lidocaine patch, resting  What makes your pain worse? Walking, stairs, standing for too long, sitting, laying down in bed, hurts to bend  How has pain affected your ability to work? Not applicable  Who lives in your household? Wife    {Rooming staff  Please complete PHQ assessment :287216}  {Rooming staff  Please complete GAD7 assessment :521905}  {Rooming staff  Please complete the PEG Assessment  Assess Pain, Function, and Quality of Life. Complete every pain visit :146250}        9/27/2011     8:00 AM 10/27/2011     1:31 PM 7/9/2025     3:07 PM   PHQ-9 SCORE   PHQ-9 Total Score 9 3    PHQ-9 Total Score   3           9/27/2011     9:35 AM 7/9/2025     3:07 PM   ABDULAZIZ-7 SCORE   Total Score 8    Total Score  0           7/9/2025     3:09 PM   PEG Score   PEG Total Score 8       Chronic Pain - Initial Assessment:    How would you describe your pain? ***  Have you had any recent changes to the severity or character of your pain? ***  Is there an underlying cause that has been identified? ***  Has your ability to work or do daily activities changed recently because of your pain? ***  Which of these pain treatments have you tried? { :07334}  Previous medication treatments:  {  ":195027}  {If newly prescribing or considering opioid therapy, the Opioid Risk Tool must be completed :273694}  {Pull in ORT results (Optional):939778}  {RIOSORD needs to be completed annually :399634}  {Pull in RIOSORD results (Optional):323901}    {Provider  Link to Assessments  Link to Pain Management SmartSet  Includes non-opioid pharmacological medications and referrals  :433235}  {additonal problems for provider to add (Optional):934874}    {ROS Picklists (Optional):329844}      Objective    /70   Pulse 74   Temp 97.9  F (36.6  C) (Temporal)   Resp 20   Ht 1.829 m (6' 0.01\")   Wt 107.5 kg (237 lb)   SpO2 96%   BMI 32.14 kg/m    Body mass index is 32.14 kg/m .  Physical Exam   {Exam List (Optional):678774}    {Diagnostic Test Results (Optional):145837}        Signed Electronically by: Osmin Lassiter PA-C  {Email feedback regarding this note to [email or web address]   :119425}  "

## 2025-07-09 NOTE — PROGRESS NOTES
Preoperative Evaluation  [facility]  [address]  [place] [address]  Phone: [phone]  Fax: [phone]  Primary Provider: Vamshi Uriarte DO  Pre-op Performing Provider: Osmin Lassiter PA-C  Jul 9, 2025       Right Cataract Eye Surgery @ [facility] on 8/7/2025 @ 11 am with Dr. Sears of [facility]      Fax number for surgical facility: [facility]    Assessment & Plan     The proposed surgical procedure is considered LOW risk.      ICD-10-CM    1. Preop general physical exam  Z01.818       2. Cataract of right eye, unspecified cataract type  H26.9       3. Primary osteoarthritis of left knee  M17.12 XR Knee Left 3 Views     diclofenac (VOLTAREN) 75 MG EC tablet     Orthopedic  Referral      4. Type 2 diabetes mellitus with hyperglycemia, without long-term current use of insulin (H)  E11.65       5. Essential hypertension with goal blood pressure less than 140/90  I10           1-2. Cleared for surgery.    3. Worsening left knee pain despite conservative measures and an injection in November. Will update an x-ray and place a referral to orthopedics surgery to discuss a replacement and will refill Voltaren tablets as these previously worked well.     4-5. Managed by PCP.        - No identified additional risk factors other than previously addressed    Antiplatelet or Anticoagulation Medication Instructions   - We reviewed the medication list and the patient is not on an antiplatelet or anticoagulation medications.    Additional Medication Instructions  We reviewed the medication list and there are no chronic medications that need to be adjusted for this procedure.    Recommendation  Approval given to proceed with proposed procedure, without further diagnostic evaluation.      Kylee Khan is a 74 year old, presenting for the following:  Knee Pain (Left ) and Pre-Op Exam           7/9/2025     3:02 PM   Additional Questions   Roomed by Mayelin RAINEY: He has a right cataract and will be undergoing the above surgery.          7/9/2025   Pre-Op Questionnaire   Have you ever had a heart attack or stroke? No   Have you ever had surgery on your heart or blood vessels, such as a stent placement, a coronary artery bypass, or surgery on an artery in your head, neck, heart, or legs? No   Do you have chest pain with activity? No   Do you have a history of heart failure? No   Do you currently have a cold, bronchitis or symptoms of other infection? No   Do you have a cough, shortness of breath, or wheezing? No   Do you or anyone in your family have previous history of blood clots? No   Do you or does anyone in your family have a serious bleeding problem such as prolonged bleeding following surgeries or cuts? No   Have you ever had problems with anemia or been told to take iron pills? No   Have you had any abnormal blood loss such as black, tarry or bloody stools? No   Have you ever had a blood transfusion? No   Are you willing to have a blood transfusion if it is medically needed before, during, or after your surgery? Yes   Have you or any of your relatives ever had problems with anesthesia? No   Do you have sleep apnea, excessive snoring or daytime drowsiness? (!) YES   Do you have a CPAP machine? (!) NO   Do you have any artifical heart valves or other implanted medical devices like a pacemaker, defibrillator, or continuous glucose monitor? No   Do you have artificial joints? (!) YES   Are you allergic to latex? No       Worsening left knee pain the past few months despite an injection in November. He thinks it may be time to move forward with a left knee replacement. He had his right knee replaced a few years ago.    Advance Care Planning    Discussed advance care planning with patient; informed AVS has link to [facility].    Status of Chronic Conditions:  See problem list for active medical  problems.  Problems all longstanding and stable, except as noted/documented.  See ROS for pertinent symptoms related to these conditions.    Patient Active Problem List    Diagnosis Date Noted    Chronic pain of both knees 09/12/2024     Priority: Medium    Status post total right knee replacement 08/11/2023     Priority: Medium    Diabetes mellitus, type 2 (H) 07/25/2023     Priority: Medium    Complex tear of medial meniscus of right knee as current injury, subsequent encounter 06/09/2021     Priority: Medium     Added automatically from request for surgery [id number]      Chondromalacia of right knee 06/09/2021     Priority: Medium     Added automatically from request for surgery [id number]      Prediabetes 05/29/2019     Priority: Medium    Essential hypertension with goal blood pressure less than 140/90 12/05/2018     Priority: Medium    Idiopathic chronic gout without tophus, unspecified site 12/05/2018     Priority: Medium    Insomnia 08/26/2011     Priority: Medium    Anxiety 08/26/2011     Priority: Medium    CARDIOVASCULAR SCREENING; LDL GOAL LESS THAN 130 10/31/2010     Priority: Medium    History of hernia repair      Priority: Medium    Parathyroid adenoma      Priority: Medium     parathyroidectomy 04/2007      Gout      Priority: Medium      Past Medical History:   Diagnosis Date    Acute medial meniscus tear of right knee     Arthritis     Chronic infection     Diabetes mellitus, type 2 (H) 07/25/2023    Essential tremor     hands    Gout     History of hernia repair     HTN     Idiopathic chronic gout without tophus, unspecified site 12/05/2018    Parathyroid adenoma 2007    parathyroidectomy 04/2007    Paresthesia of right leg     Partial arterial retinal occlusion     right eye    Sleep apnea     no CPAP     Past Surgical History:   Procedure Laterality Date    ABDOMEN SURGERY      Hernia repair left and right side    ARTHROPLASTY KNEE Right 8/11/2023    Procedure: ARTHROPLASTY, KNEE, TOTAL RIGHT;   Surgeon: Miky Jarvis MD;  Location: [facility] Main OR    ARTHROSCOPY KNEE WITH MEDIAL MENISCECTOMY Right 06/30/2021    Procedure: ARTHROSCOPY, RIGHT KNEE, WITH MEDIAL MENISCECTOMY, MAJOR CHONDROPLASTY.;  Surgeon: Jens Riley MD;  Location: [facility] OR    COLONOSCOPY      HERNIA REPAIR      LAMINECT/DISCECTOMY, CERVICAL  01/01/1999    PARATHYROIDECTOMY  2007    partial    ZZC APPENDECTOMY       Current Outpatient Medications   Medication Sig Dispense Refill    acetaminophen (TYLENOL) 325 MG tablet Take 3 tablets (975 mg) by mouth every 8 hours 100 tablet 0    allopurinol (ZYLOPRIM) 100 MG tablet TAKE 1 TABLET BY MOUTH TWICE DAILY FOR  GOUT 180 tablet 3    ascorbic acid (VITAMIN C) 1000 MG TABS Take 1,000 mg by mouth 2 times daily.      carvedilol (COREG) 25 MG tablet TAKE 1 TABLET BY MOUTH TWICE DAILY WITH MEALS FOR BLOOD PRESSURE 180 tablet 3    chlorthalidone (HYGROTON) 25 MG tablet Take 1 tablet (25 mg) by mouth every morning For blood pressure. 90 tablet 3    diclofenac (VOLTAREN) 75 MG EC tablet Take 1 tablet (75 mg) by mouth 2 times daily. 180 tablet 0    losartan (COZAAR) 100 MG tablet Take 1 tablet by mouth once daily for blood pressure 90 tablet 3    metFORMIN (GLUCOPHAGE) 500 MG tablet TAKE 1 TABLET BY MOUTH TWICE DAILY WITH MEALS FOR DIABETES 180 tablet 3    MULTIPLE VITAMIN PO Take 1 tablet by mouth daily Stopping 8/4/23 before surgery      ondansetron (ZOFRAN ODT) 4 MG ODT tab Take 1 tablet (4 mg) by mouth every 6 hours as needed for nausea or vomiting (Patient not taking: Reported on 7/9/2025) 8 tablet 0    primidone (MYSOLINE) 50 MG tablet Take 2 tablets (100 mg) by mouth 2 times daily. (Patient not taking: Reported on 7/9/2025) 360 tablet 2       Allergies   Allergen Reactions    [place] Grass Extract Other (See Comments)     Coughing sneezing    Cats     Grass Pollen(K-O-R-T-Swt Sourav) Cough    Lisinopril Cough    Molds & Smuts Other (See Comments)     Coughing sneezing  Coughing sneezing    "   Shellfish Allergy Other (See Comments)     Causes gout        Social History     Tobacco Use    Smoking status: Never     Passive exposure: Never    Smokeless tobacco: Never   Substance Use Topics    Alcohol use: Not Currently     Comment: rarely     History   Drug Use No         Review of Systems  Constitutional, HEENT, cardiovascular, pulmonary, GI, , musculoskeletal, neuro, skin, endocrine and psych systems are negative, except as otherwise noted.    Objective    /70   Pulse 74   Temp 97.9  F (36.6  C) (Temporal)   Resp 20   Ht 1.829 m (6' 0.01\")   Wt 107.5 kg (237 lb)   SpO2 96%   BMI 32.14 kg/m     Estimated body mass index is 32.14 kg/m  as calculated from the following:    Height as of this encounter: 1.829 m (6' 0.01\").    Weight as of this encounter: 107.5 kg (237 lb).  Physical Exam  GENERAL: alert and no distress  EYES: Eyes grossly normal to inspection, PERRL and conjunctivae and sclerae normal  HENT: ear canals and TM's normal, nose and mouth without ulcers or lesions  NECK: no adenopathy, no asymmetry, masses, or scars  RESP: lungs clear to auscultation - no rales, rhonchi or wheezes  CV: regular rate and rhythm, normal S1 S2, no S3 or S4, no murmur, click or rub, no peripheral edema  ABDOMEN: soft, nontender, no hepatosplenomegaly, no masses and bowel sounds normal  MS: no gross musculoskeletal defects noted, no edema. Tenderness over the left knee medial joint line.   SKIN: no suspicious lesions or rashes  NEURO: Normal strength and tone, mentation intact and speech normal. Gait is antalgic.   PSYCH: mentation appears normal, affect normal/bright    Recent Labs   Lab Test 05/23/25  1428 10/22/24  1140    142   POTASSIUM 3.2* 3.5   CR 0.89 0.71   A1C 7.2* 6.7*        Diagnostics  No labs were ordered during this visit.   No EKG required for low risk surgery (cataract, skin procedure, breast biopsy, etc).    Revised Cardiac Risk Index (RCRI)  The patient has the following " serious cardiovascular risks for perioperative complications:   - No serious cardiac risks = 0 points     RCRI Interpretation: 0 points: Class I (very low risk - 0.4% complication rate)         Signed Electronically by: Osmin Lassiter PA-C  A copy of this evaluation report is provided to the requesting physician.      Answers submitted by the patient for this visit:  General Questionnaire (Submitted on 7/8/2025)  Chief Complaint: Chronic problems general questions HPI Form  What is the reason for your visit today? : Knee pain  How many days per week do you miss taking your medication?: 0  Questionnaire about: Chronic problems general questions HPI Form (Submitted on 7/8/2025)  Chief Complaint: Chronic problems general questions HPI Form

## 2025-07-09 NOTE — PATIENT INSTRUCTIONS
You are cleared for cataract surgery.    Will order a knee x-ray, restart Voltaren and refer to orthopedics.

## 2025-07-10 ENCOUNTER — PATIENT OUTREACH (OUTPATIENT)
Dept: CARE COORDINATION | Facility: CLINIC | Age: 75
End: 2025-07-10
Payer: MEDICARE

## 2025-07-10 NOTE — NURSING NOTE
Preop was faxed to Dr. Sears at [facility] @ [phone]  Mayelin Flores MA

## 2025-07-17 ENCOUNTER — OFFICE VISIT (OUTPATIENT)
Dept: ORTHOPEDICS | Facility: CLINIC | Age: 75
End: 2025-07-17
Attending: PHYSICIAN ASSISTANT
Payer: MEDICARE

## 2025-07-17 VITALS — RESPIRATION RATE: 18 BRPM | BODY MASS INDEX: 32.37 KG/M2 | HEIGHT: 72 IN | WEIGHT: 239 LBS

## 2025-07-17 DIAGNOSIS — M17.12 PRIMARY OSTEOARTHRITIS OF LEFT KNEE: ICD-10-CM

## 2025-07-17 NOTE — LETTER
7/17/2025      Bill Smith  [address]  [place] [address]      Dear Colleague,    Thank you for referring your patient, Bill Smith, to the [facility]. Please see a copy of my visit note below.    Bill Smith is a 74 year old male who is seen in consultation at the request of Osmin Lassiter for left knee pain.  He has had problems with the left knee for several years.  It is gotten worse in the last 6 months.  He reports constant pain rated 7 out of 10.  Exercise and activity makes it worse.  He uses diclofenac.  He has had steroid injections in the past with the most recent in September 2024.  At that time an x-ray showed moderate arthritis of the left knee.  He has had right total knee arthroplasty performed on 8/11/2023 by Dr. Miky Jarvis.  He still feels occasional giving way in the right leg.  On evaluation in September 2024, Dr. Jarvis felt the knee was stable.  He sent him to physical therapy at that time.  His back has not been evaluated.    X-ray now shows advanced osteoarthritis of the left knee with bone-on-bone medially.    Past Medical History:   Diagnosis Date     Acute medial meniscus tear of right knee      Arthritis      Chronic infection      Diabetes mellitus, type 2 (H) 07/25/2023     Essential tremor     hands     Gout      History of hernia repair      HTN      Idiopathic chronic gout without tophus, unspecified site 12/05/2018     Parathyroid adenoma 2007    parathyroidectomy 04/2007     Paresthesia of right leg      Partial arterial retinal occlusion     right eye     Sleep apnea     no CPAP       Past Surgical History:   Procedure Laterality Date     ABDOMEN SURGERY      Hernia repair left and right side     ARTHROPLASTY KNEE Right 8/11/2023    Procedure: ARTHROPLASTY, KNEE, TOTAL RIGHT;  Surgeon: Miky Jarvis MD;  Location: [facility] Main OR     ARTHROSCOPY KNEE WITH MEDIAL MENISCECTOMY Right 06/30/2021    Procedure: ARTHROSCOPY, RIGHT  KNEE, WITH MEDIAL MENISCECTOMY, MAJOR CHONDROPLASTY.;  Surgeon: Jens Riley MD;  Location: [facility] OR     COLONOSCOPY       HERNIA REPAIR       LAMINECT/DISCECTOMY, CERVICAL  01/01/1999     PARATHYROIDECTOMY  2007    partial     ZZC APPENDECTOMY         Family History   Problem Relation Age of Onset     Prostate Cancer Father      Alzheimer Disease Paternal Grandmother      Hypertension Maternal Uncle         AGE 60'S       Social History     Socioeconomic History     Marital status:      Spouse name: Not on file     Number of children: Not on file     Years of education: Not on file     Highest education level: Not on file   Occupational History     Not on file   Tobacco Use     Smoking status: Never     Passive exposure: Never     Smokeless tobacco: Never   Vaping Use     Vaping status: Never Used   Substance and Sexual Activity     Alcohol use: Not Currently     Comment: rarely     Drug use: No     Sexual activity: Not Currently     Partners: Female   Other Topics Concern     Parent/sibling w/ CABG, MI or angioplasty before 65F 55M? No      Service Yes     Blood Transfusions No     Caffeine Concern No     Occupational Exposure No     Hobby Hazards No     Sleep Concern Yes     Stress Concern Yes     Comment: job related      Weight Concern No     Special Diet No     Back Care No     Exercise Yes     Bike Helmet No     Seat Belt Yes     Self-Exams No   Social History Narrative     Not on file     Social Drivers of Health     Financial Resource Strain: Low Risk  (10/17/2024)    Financial Resource Strain      Within the past 12 months, have you or your family members you live with been unable to get utilities (heat, electricity) when it was really needed?: No   Food Insecurity: Low Risk  (10/17/2024)    Food Insecurity      Within the past 12 months, did you worry that your food would run out before you got money to buy more?: No      Within the past 12 months, did the food you bought just not  last and you didn t have money to get more?: No   Transportation Needs: Low Risk  (10/17/2024)    Transportation Needs      Within the past 12 months, has lack of transportation kept you from medical appointments, getting your medicines, non-medical meetings or appointments, work, or from getting things that you need?: No   Physical Activity: Insufficiently Active (10/17/2024)    Exercise Vital Sign      Days of Exercise per Week: 3 days      Minutes of Exercise per Session: 20 min   Stress: No Stress Concern Present (10/17/2024)    [employer] [place] [employer] - Occupational Stress Questionnaire      Feeling of Stress : Not at all   Social Connections: Unknown (10/17/2024)    Social Connection and Isolation Panel [NHANES]      Frequency of Communication with Friends and Family: Not on file      Frequency of Social Gatherings with Friends and Family: Once a week      Attends Zoroastrian Services: Not on file      Active Member of Clubs or Organizations: Not on file      Attends Club or Organization Meetings: Not on file      Marital Status: Not on file   Interpersonal Safety: Low Risk  (5/23/2025)    Interpersonal Safety      Do you feel physically and emotionally safe where you currently live?: Yes      Within the past 12 months, have you been hit, slapped, kicked or otherwise physically hurt by someone?: No      Within the past 12 months, have you been humiliated or emotionally abused in other ways by your partner or ex-partner?: No   Housing Stability: Low Risk  (10/17/2024)    Housing Stability      Do you have housing? : Yes      Are you worried about losing your housing?: No       Current Outpatient Medications   Medication Sig Dispense Refill     acetaminophen (TYLENOL) 325 MG tablet Take 3 tablets (975 mg) by mouth every 8 hours 100 tablet 0     allopurinol (ZYLOPRIM) 100 MG tablet TAKE 1 TABLET BY MOUTH TWICE DAILY FOR  GOUT 180 tablet 3     ascorbic acid (VITAMIN C) 1000 MG TABS Take 1,000 mg by  mouth 2 times daily.       carvedilol (COREG) 25 MG tablet TAKE 1 TABLET BY MOUTH TWICE DAILY WITH MEALS FOR BLOOD PRESSURE 180 tablet 3     chlorthalidone (HYGROTON) 25 MG tablet Take 1 tablet (25 mg) by mouth every morning For blood pressure. 90 tablet 3     diclofenac (VOLTAREN) 75 MG EC tablet Take 1 tablet (75 mg) by mouth 2 times daily. 180 tablet 0     losartan (COZAAR) 100 MG tablet Take 1 tablet by mouth once daily for blood pressure 90 tablet 3     metFORMIN (GLUCOPHAGE) 500 MG tablet TAKE 1 TABLET BY MOUTH TWICE DAILY WITH MEALS FOR DIABETES 180 tablet 3     MULTIPLE VITAMIN PO Take 1 tablet by mouth daily Stopping 8/4/23 before surgery         Allergies   Allergen Reactions     [place] Grass Extract Other (See Comments)     Coughing sneezing     Cats      Grass Pollen(K-[id number]-Swt Sourav) Cough     Lisinopril Cough     Molds & Smuts Other (See Comments)     Coughing sneezing  Coughing sneezing       Shellfish Allergy Other (See Comments)     Causes gout       REVIEW OF SYSTEMS:  CONSTITUTIONAL:  NEGATIVE for fever, chills, change in weight, not feeling tired  SKIN:  NEGATIVE for worrisome rashes, no skin lumps, no skin ulcers and no non-healing wounds  EYES:  NEGATIVE for vision changes or irritation.  ENT/MOUTH:  NEGATIVE.  No hearing loss, no hoarseness, no difficulty swallowing.  RESP:  NEGATIVE. No cough or shortness of breath.  CV:  NEGATIVE for chest pain, palpitations or peripheral edema  GI:  NEGATIVE for nausea, abdominal pain, heartburn, or change in bowel habits  :  Negative. No dysuria, no hematuria  MUSCULOSKELETAL:  See HPI above  NEURO:  NEGATIVE . No headaches, no dizziness,  no numbness  ENDOCRINE:  NEGATIVE for temperature intolerance, skin/hair changes  HEME/ALLERGY/IMMUNE:  NEGATIVE for bleeding problems  PSYCHIATRIC:  NEGATIVE. no anxiety, no depression.     Exam:  Vitals: Resp 18   Ht 1.829 m (6')   Wt 108.4 kg (239 lb)   BMI 32.41 kg/m    BMI= Body mass index is 32.41  kg/m .  Constitutional:  healthy, alert and no distress  Neuro: Alert and Oriented x 3, no focal defects.  Psych: Affect normal   Respiratory: Breathing not labored.  Cardiovascular: normal peripheral pulses  Lymph: no adenopathy  Skin: No rashes,worrisome lesions or skin problems  He has motion of the right knee from about 3 to 130 degrees.  He has a well-healed anterior scar  He has motion of the left knee from about 3 to 120 degrees.  He does have tenderness of the medial joint line on the left.  He has no ligamentous laxity of either knee.  He has a small effusion on the left knee.    Assessment:  left knee osteoarthritis - severe.  2. Right total knee arthroplasty doing well.  Leg instability with prolonged standing may be from his spine or could be from a slight flexion contracture persisting of the knees.  He nieto in [facility].  He wants to leave by about November 1.  He would like to have knee replaced before then, but I would not be able to do it until October.  I feel this is too short of time for the rehab to be supervised.  Thus, we will have him see Dr. Payal Riley, as he feels he can get the knee in sooner.      Again, thank you for allowing me to participate in the care of your patient.        Sincerely,        Patrick Hall MD    Electronically signed

## 2025-07-17 NOTE — PROGRESS NOTES
Bill Smith is a 74 year old male who is seen in consultation at the request of Osmin Lassiter for left knee pain.  He has had problems with the left knee for several years.  It is gotten worse in the last 6 months.  He reports constant pain rated 7 out of 10.  Exercise and activity makes it worse.  He uses diclofenac.  He has had steroid injections in the past with the most recent in September 2024.  At that time an x-ray showed moderate arthritis of the left knee.  He has had right total knee arthroplasty performed on 8/11/2023 by Dr. Miky Jarvis.  He still feels occasional giving way in the right leg.  On evaluation in September 2024, Dr. Jarvis felt the knee was stable.  He sent him to physical therapy at that time.  His back has not been evaluated.    X-ray now shows advanced osteoarthritis of the left knee with bone-on-bone medially.    Past Medical History:   Diagnosis Date    Acute medial meniscus tear of right knee     Arthritis     Chronic infection     Diabetes mellitus, type 2 (H) 07/25/2023    Essential tremor     hands    Gout     History of hernia repair     HTN     Idiopathic chronic gout without tophus, unspecified site 12/05/2018    Parathyroid adenoma 2007    parathyroidectomy 04/2007    Paresthesia of right leg     Partial arterial retinal occlusion     right eye    Sleep apnea     no CPAP       Past Surgical History:   Procedure Laterality Date    ABDOMEN SURGERY      Hernia repair left and right side    ARTHROPLASTY KNEE Right 8/11/2023    Procedure: ARTHROPLASTY, KNEE, TOTAL RIGHT;  Surgeon: Miky Jarvis MD;  Location: [facility] Main OR    ARTHROSCOPY KNEE WITH MEDIAL MENISCECTOMY Right 06/30/2021    Procedure: ARTHROSCOPY, RIGHT KNEE, WITH MEDIAL MENISCECTOMY, MAJOR CHONDROPLASTY.;  Surgeon: Jens Riley MD;  Location:  OR    COLONOSCOPY      HERNIA REPAIR      LAMINECT/DISCECTOMY, CERVICAL  01/01/1999    PARATHYROIDECTOMY  2007    partial    ZZC APPENDECTOMY          Family History   Problem Relation Age of Onset    Prostate Cancer Father     Alzheimer Disease Paternal Grandmother     Hypertension Maternal Uncle         AGE 60'S       Social History     Socioeconomic History    Marital status:      Spouse name: Not on file    Number of children: Not on file    Years of education: Not on file    Highest education level: Not on file   Occupational History    Not on file   Tobacco Use    Smoking status: Never     Passive exposure: Never    Smokeless tobacco: Never   Vaping Use    Vaping status: Never Used   Substance and Sexual Activity    Alcohol use: Not Currently     Comment: rarely    Drug use: No    Sexual activity: Not Currently     Partners: Female   Other Topics Concern    Parent/sibling w/ CABG, MI or angioplasty before 65F 55M? No     Service Yes    Blood Transfusions No    Caffeine Concern No    Occupational Exposure No    Hobby Hazards No    Sleep Concern Yes    Stress Concern Yes     Comment: job related     Weight Concern No    Special Diet No    Back Care No    Exercise Yes    Bike Helmet No    Seat Belt Yes    Self-Exams No   Social History Narrative    Not on file     Social Drivers of Health     Financial Resource Strain: Low Risk  (10/17/2024)    Financial Resource Strain     Within the past 12 months, have you or your family members you live with been unable to get utilities (heat, electricity) when it was really needed?: No   Food Insecurity: Low Risk  (10/17/2024)    Food Insecurity     Within the past 12 months, did you worry that your food would run out before you got money to buy more?: No     Within the past 12 months, did the food you bought just not last and you didn t have money to get more?: No   Transportation Needs: Low Risk  (10/17/2024)    Transportation Needs     Within the past 12 months, has lack of transportation kept you from medical appointments, getting your medicines, non-medical meetings or appointments, work, or from  getting things that you need?: No   Physical Activity: Insufficiently Active (10/17/2024)    Exercise Vital Sign     Days of Exercise per Week: 3 days     Minutes of Exercise per Session: 20 min   Stress: No Stress Concern Present (10/17/2024)    [employer] [place] [employer] - Occupational Stress Questionnaire     Feeling of Stress : Not at all   Social Connections: Unknown (10/17/2024)    Social Connection and Isolation Panel [NHANES]     Frequency of Communication with Friends and Family: Not on file     Frequency of Social Gatherings with Friends and Family: Once a week     Attends Hinduism Services: Not on file     Active Member of Clubs or Organizations: Not on file     Attends Club or Organization Meetings: Not on file     Marital Status: Not on file   Interpersonal Safety: Low Risk  (5/23/2025)    Interpersonal Safety     Do you feel physically and emotionally safe where you currently live?: Yes     Within the past 12 months, have you been hit, slapped, kicked or otherwise physically hurt by someone?: No     Within the past 12 months, have you been humiliated or emotionally abused in other ways by your partner or ex-partner?: No   Housing Stability: Low Risk  (10/17/2024)    Housing Stability     Do you have housing? : Yes     Are you worried about losing your housing?: No       Current Outpatient Medications   Medication Sig Dispense Refill    acetaminophen (TYLENOL) 325 MG tablet Take 3 tablets (975 mg) by mouth every 8 hours 100 tablet 0    allopurinol (ZYLOPRIM) 100 MG tablet TAKE 1 TABLET BY MOUTH TWICE DAILY FOR  GOUT 180 tablet 3    ascorbic acid (VITAMIN C) 1000 MG TABS Take 1,000 mg by mouth 2 times daily.      carvedilol (COREG) 25 MG tablet TAKE 1 TABLET BY MOUTH TWICE DAILY WITH MEALS FOR BLOOD PRESSURE 180 tablet 3    chlorthalidone (HYGROTON) 25 MG tablet Take 1 tablet (25 mg) by mouth every morning For blood pressure. 90 tablet 3    diclofenac (VOLTAREN) 75 MG EC tablet Take 1  tablet (75 mg) by mouth 2 times daily. 180 tablet 0    losartan (COZAAR) 100 MG tablet Take 1 tablet by mouth once daily for blood pressure 90 tablet 3    metFORMIN (GLUCOPHAGE) 500 MG tablet TAKE 1 TABLET BY MOUTH TWICE DAILY WITH MEALS FOR DIABETES 180 tablet 3    MULTIPLE VITAMIN PO Take 1 tablet by mouth daily Stopping 8/4/23 before surgery         Allergies   Allergen Reactions    [place] Grass Extract Other (See Comments)     Coughing sneezing    Cats     Grass Pollen(K-O-R-T-Swt Sourav) Cough    Lisinopril Cough    Molds & Smuts Other (See Comments)     Coughing sneezing  Coughing sneezing      Shellfish Allergy Other (See Comments)     Causes gout       REVIEW OF SYSTEMS:  CONSTITUTIONAL:  NEGATIVE for fever, chills, change in weight, not feeling tired  SKIN:  NEGATIVE for worrisome rashes, no skin lumps, no skin ulcers and no non-healing wounds  EYES:  NEGATIVE for vision changes or irritation.  ENT/MOUTH:  NEGATIVE.  No hearing loss, no hoarseness, no difficulty swallowing.  RESP:  NEGATIVE. No cough or shortness of breath.  CV:  NEGATIVE for chest pain, palpitations or peripheral edema  GI:  NEGATIVE for nausea, abdominal pain, heartburn, or change in bowel habits  :  Negative. No dysuria, no hematuria  MUSCULOSKELETAL:  See HPI above  NEURO:  NEGATIVE . No headaches, no dizziness,  no numbness  ENDOCRINE:  NEGATIVE for temperature intolerance, skin/hair changes  HEME/ALLERGY/IMMUNE:  NEGATIVE for bleeding problems  PSYCHIATRIC:  NEGATIVE. no anxiety, no depression.     Exam:  Vitals: Resp 18   Ht 1.829 m (6')   Wt 108.4 kg (239 lb)   BMI 32.41 kg/m    BMI= Body mass index is 32.41 kg/m .  Constitutional:  healthy, alert and no distress  Neuro: Alert and Oriented x 3, no focal defects.  Psych: Affect normal   Respiratory: Breathing not labored.  Cardiovascular: normal peripheral pulses  Lymph: no adenopathy  Skin: No rashes,worrisome lesions or skin problems  He has motion of the right knee from about  3 to 130 degrees.  He has a well-healed anterior scar  He has motion of the left knee from about 3 to 120 degrees.  He does have tenderness of the medial joint line on the left.  He has no ligamentous laxity of either knee.  He has a small effusion on the left knee.    Assessment:  left knee osteoarthritis - severe.  2. Right total knee arthroplasty doing well.  Leg instability with prolonged standing may be from his spine or could be from a slight flexion contracture persisting of the knees.  He nieto in [facility].  He wants to leave by about November 1.  He would like to have knee replaced before then, but I would not be able to do it until October.  I feel this is too short of time for the rehab to be supervised.  Thus, we will have him see Dr. Payal Riley, as he feels he can get the knee in sooner.

## 2025-07-17 NOTE — PATIENT INSTRUCTIONS
Dr. Riley, Orthopedic Surgery  Scheduling phone number: [phone]    Surgery at [place] or [facility] [place] (Tuesday, Wednesday, Thursday PM) and [place] (Thursday AM)

## 2025-07-18 ASSESSMENT — KOOS JR
RISING FROM SITTING: SEVERE
BENDING TO THE FLOOR TO PICK UP OBJECT: SEVERE
TWISING OR PIVOTING ON KNEE: MODERATE
HOW SEVERE IS YOUR KNEE STIFFNESS AFTER FIRST WAKING IN MORNING: MODERATE
KOOS JR SCORING: 44.91
GOING UP OR DOWN STAIRS: SEVERE
STANDING UPRIGHT: MODERATE
STRAIGHTENING KNEE FULLY: MODERATE

## 2025-07-23 NOTE — PROGRESS NOTES
HISTORY OF PRESENT ILLNESS    Bill Smith is a 74 year old male, with a history of lower extremity neuropathy and DM2 who is seen in consultation at the request of Patrick Hall MD for evaluation of  left knee osteoarthritis. He saw Dr. Hall last week who felt that he is a candidate for total knee arthroplasty on the left, but Dr. Hall's schedule could not accommodate Bill's schedule.    He has had problems with the left knee for several years.  It is gotten worse in the last 6 months.  He reports constant pain rated 7 out of 10.  Exercise and activity makes it worse.  He uses diclofenac.  He has had steroid injections in the past with the most recent in September 2024.  At that time an x-ray showed moderate arthritis of the left knee.    Treatments tried to this point: corticosteroid injection, lidocaine patches, heat, Synvisc ONE     Orthopedic PMH: history of RIGHT total knee arthroplasty 2 years ago. Patella not resurfaced.   He has had numbness and swelling. Discomfort and can't specify why he's unhappy with the right knee, but doesn't specifically have pain from the patellofemoral joint.  The right knee is better than before surgery.  He has had right total knee arthroplasty performed on 8/11/2023 by Dr. Miky Jarvis.  He still feels occasional giving way in the right leg.  On evaluation in September 2024, Dr. Jarvis felt the knee was stable.  He sent him to physical therapy at that time.  His back has not been evaluated.    He had previous right knee arthroscopy 2021    Other PMH:   Past Medical History:   Diagnosis Date    Acute medial meniscus tear of right knee     Arthritis     Chronic infection  (?)     Diabetes mellitus, type 2 (H) 07/25/2023    Essential tremor     hands    Gout     History of hernia repair     HTN     Idiopathic chronic gout without tophus, unspecified site 12/05/2018    Parathyroid adenoma 2007    parathyroidectomy 04/2007    Paresthesia of right leg-- he has  numbness/tingling in both legs     Partial arterial retinal occlusion     right eye    Sleep apnea     no CPAP      Current Outpatient Medications   Medication Sig Dispense Refill    acetaminophen (TYLENOL) 325 MG tablet Take 3 tablets (975 mg) by mouth every 8 hours 100 tablet 0    allopurinol (ZYLOPRIM) 100 MG tablet TAKE 1 TABLET BY MOUTH TWICE DAILY FOR  GOUT 180 tablet 3    ascorbic acid (VITAMIN C) 1000 MG TABS Take 1,000 mg by mouth 2 times daily.      carvedilol (COREG) 25 MG tablet TAKE 1 TABLET BY MOUTH TWICE DAILY WITH MEALS FOR BLOOD PRESSURE 180 tablet 3    chlorthalidone (HYGROTON) 25 MG tablet Take 1 tablet (25 mg) by mouth every morning For blood pressure. 90 tablet 3    diclofenac (VOLTAREN) 75 MG EC tablet Take 1 tablet (75 mg) by mouth 2 times daily. 180 tablet 0    losartan (COZAAR) 100 MG tablet Take 1 tablet by mouth once daily for blood pressure 90 tablet 3    metFORMIN (GLUCOPHAGE) 500 MG tablet TAKE 1 TABLET BY MOUTH TWICE DAILY WITH MEALS FOR DIABETES 180 tablet 3    MULTIPLE VITAMIN PO Take 1 tablet by mouth daily Stopping 8/4/23 before surgery           Surgical:  has a past surgical history that includes laminect/discectomy, cervical (01/01/1999); APPENDECTOMY; hernia repair; Arthroscopy knee with medial meniscectomy (Right, 06/30/2021); Parathyroidectomy (2007); Abdomen surgery; colonoscopy; and Arthroplasty knee (Right, 8/11/2023).    Family Hx:  family history includes Alzheimer Disease in his paternal grandmother; Hypertension in his maternal uncle; Prostate Cancer in his father.    Social Hx:  reports that he has never smoked. He has never been exposed to tobacco smoke. He has never used smokeless tobacco. He reports that he does not currently use alcohol. He reports that he does not use drugs.    REVIEW OF SYSTEMS:    CONSTITUTIONAL:  NEGATIVE for fever, chills, change in weight  INTEGUMENTARY/SKIN:  NEGATIVE for worrisome rashes, moles or lesions  EYES:  NEGATIVE for vision  changes or irritation  ENT/MOUTH:  NEGATIVE for ear, mouth and throat problems  RESP:  NEGATIVE for significant cough or SOB  BREAST:  NEGATIVE for masses, tenderness or discharge  CV:  NEGATIVE for chest pain, palpitations or peripheral edema  GI:  NEGATIVE for nausea, abdominal pain, heartburn, or change in bowel habits  :  Negative   MUSCULOSKELETAL:  See HPI above  NEURO:  NEGATIVE for weakness, dizziness or paresthesias  ENDOCRINE:  NEGATIVE for temperature intolerance, skin/hair changes  HEME/ALLERGY/IMMUNE:  NEGATIVE for bleeding problems  PSYCHIATRIC:  NEGATIVE for changes in mood or affect    PHYSICAL EXAM:  /80 (BP Location: Left arm, Patient Position: Sitting, Cuff Size: Adult Regular)   Pulse 77   Ht 1.829 m (6')   Wt 108.4 kg (239 lb)   SpO2 97%   BMI 32.41 kg/m     GENERAL APPEARANCE: healthy, alert, and no distress   SKIN: no suspicious lesions or rashes  NEURO: Normal strength and tone, mentation intact, and speech normal  VASCULAR:  good pulses, and cappillary refill   LYMPH: no lymphadenopathy   PSYCH:  mentation appears normal and affect normal/bright  RESP: no increased work of breathing     KNEE EXAM:  left knee:   Gait: uses a cane  Alignment: mild varus  Patellofemoral joint: mild crepitations in the patellofemoral joint.  Effusion: mild  ROM: 0-110  Tender: medial joint line  Masses: none  Ligaments:  Lachman's Stable, Anterior and posterior drawer stable, stable to varus and valgus stress.  no pain with Varus/Valgus stress testing.      Right knee  Facet Tenderness: NO  Good range of motion   Good collateral stability      EXAM: XR KNEE LEFT 3 VIEWS  LOCATION: [facility]  DATE: 7/9/2025   Degenerative change at the medial compartment with near bone-on-bone contact. Slight genu varus. Mild degenerative change patellofemoral compartment. Tiny effusion. Chondrocalcinosis in the lateral compartment noted.  No posterior osteophytes of  note.          Impression:   Encounter Diagnosis   Name Primary?    Primary osteoarthritis of left knee Yes   Mainly medial.  Mild-moderate patellofemoral joint involvement.     Plan:  since he is not fully happy with the right total knee, I couldn't guarantee him that he wouldn't have the same feeling about a total knee arthroplasty on the left side.  We could opt to resurface the left patella depending on the appearance of the articular surface, and hope he feels better about his new knee.  We did discuss an  brace, and this was ordered to trial.  I have scheduled him for a left total knee arthroplasty, but if the brace is a revelation for him, he may cancel surgery. Because of the chondrocalcinosis on the lateral side, I wouldn't suggest unicompartmental knee arthroplasty     Total Knee Arthroplasty: We talked about the patient's condition and diagnosis.  Because of severe arthritis, and failure of conservative measures, I have suggested total knee arthroplasty of the left knee(s).         Bill Smith has severe, disabling pain and loss of knee function to the extent which interferes with ability to carry out age appropriate activities of daily living.  Patient does demonstrate a function-limiting pain at short distances for at least 12 months duration.     Patient has failed/exhausted appropriate course of provider-directed non-surgical management for at least 3 months. (See above)  The patient's smoking (nicotine) status:   Examples of nicotine products include cigarettes, non-combusted cigarettes, cigars, smokeless tobacco (e.g., dip, snuff, snus, chewing tobacco), hookah tobacco, e-cigarettes, and vape pens.  Patient is a never-smoker  Patient has advanced radiographic imaging which correlates with the individual's reported symptoms and physical exam findings. This includes serial x-rays which shows evidence of advanced degenerative changes including complete medial joint space narrowing and  osteophyte formation.  Kellgren-Adelso grade 4 changes are noted    Patient's medical chart was reviewed and they do not have any specific contraindications to total knee arthroplasty that would have a unacceptable risk of compromising the outcome.     He does have some risks to take into consideration, including diabetes.    Bill Smith  is therefore a appropriate candidate for right total knee arthroplasty. Medical clearance will need to be obtained.     Risks, benefits and alternatives to total knee arthroplasty discussed in detail including but not limited to bleeding which may necessitate transfusion, infection which may require secondary surgery, nerve damage which may result in temporary or permanent sensory and/or motor deficit,   vascular injury, DVT, PE, continued pain, (both perioperative and persistent post-recovery pain), numbness around the wound, instability, and potential anesthetic and perioperative medical complications, and the possibility of needing additional proceduresinstability, perceived limb length inequality, intraoperative and postoperative fracture, and possible need for revision were discussed. Possibility of post surgical pain and stiffness possibly requiring manipulation under anesthesia discussed. Possible postoperative pain with kneeling discussed. The overall average satisfaction rate of approximately 80-90% after total knee arthoplasty was discussed.  We also discussed the arduous nature of the early rehabilitation.  Following total knee arthroplasty.  We specifically discussed the first 6 weeks during which time patients often have significant swelling, stiffness and soreness.  Patient verbalized understanding and elects to proceed with total knee arthroplasty in light of potential benefits.     In addition I explained that total knee arthroplasty is considered an outpatient procedure.  An overnight stay is allowed but the patient must be able to go home by the day after  surgery.   It was explained to the patient that friends and family should be recruited to help with activities of daily living at home and be available to drive the patient to physical therapy appointments.  Home health care physical therapy has not been universally available.      Special considerations evaluate patella. We probably will re-surface    DVT/VTE risk is low, ASA 325mg twice daily will be used for anticoagulation postoperative x 1 month.       MONICA Riley MD  Dept. Orthopedic Surgery  [facility]

## 2025-07-24 ENCOUNTER — OFFICE VISIT (OUTPATIENT)
Dept: ORTHOPEDICS | Facility: CLINIC | Age: 75
End: 2025-07-24
Payer: MEDICARE

## 2025-07-24 ENCOUNTER — HOSPITAL ENCOUNTER (OUTPATIENT)
Facility: CLINIC | Age: 75
End: 2025-07-24
Attending: ORTHOPAEDIC SURGERY | Admitting: ORTHOPAEDIC SURGERY
Payer: MEDICARE

## 2025-07-24 VITALS
HEART RATE: 77 BPM | DIASTOLIC BLOOD PRESSURE: 80 MMHG | HEIGHT: 72 IN | OXYGEN SATURATION: 97 % | SYSTOLIC BLOOD PRESSURE: 135 MMHG | BODY MASS INDEX: 32.37 KG/M2 | WEIGHT: 239 LBS

## 2025-07-24 DIAGNOSIS — M17.12 PRIMARY OSTEOARTHRITIS OF LEFT KNEE: Primary | ICD-10-CM

## 2025-07-24 RX ORDER — CELECOXIB 100 MG/1
400 CAPSULE ORAL ONCE
OUTPATIENT
Start: 2025-07-24 | End: 2025-07-24

## 2025-07-24 RX ORDER — TRANEXAMIC ACID 650 MG/1
1950 TABLET ORAL ONCE
OUTPATIENT
Start: 2025-07-24 | End: 2025-07-24

## 2025-07-24 RX ORDER — ACETAMINOPHEN 325 MG/1
975 TABLET ORAL ONCE
OUTPATIENT
Start: 2025-07-24 | End: 2025-07-24

## 2025-07-24 ASSESSMENT — PAIN SCALES - GENERAL: PAINLEVEL_OUTOF10: SEVERE PAIN (8)

## 2025-07-24 NOTE — LETTER
7/24/2025      Bill Smith  [address]  [place] [address]      Dear Colleague,    Thank you for referring your patient, Bill Smith, to the [facility]. Please see a copy of my visit note below.    HISTORY OF PRESENT ILLNESS    Bill Smith is a 74 year old male, with a history of lower extremity neuropathy and DM2 who is seen in consultation at the request of Patrick Hall MD for evaluation of  left knee osteoarthritis. He saw Dr. Hall last week who felt that he is a candidate for total knee arthroplasty on the left, but Dr. Hall's schedule could not accommodate Bill's schedule.    He has had problems with the left knee for several years.  It is gotten worse in the last 6 months.  He reports constant pain rated 7 out of 10.  Exercise and activity makes it worse.  He uses diclofenac.  He has had steroid injections in the past with the most recent in September 2024.  At that time an x-ray showed moderate arthritis of the left knee.    Treatments tried to this point: corticosteroid injection, lidocaine patches, heat, Synvisc ONE     Orthopedic PMH: history of RIGHT total knee arthroplasty 2 years ago. Patella not resurfaced.   He has had numbness and swelling. Discomfort and can't specify why he's unhappy with the right knee, but doesn't specifically have pain from the patellofemoral joint.  The right knee is better than before surgery.  He has had right total knee arthroplasty performed on 8/11/2023 by Dr. Miky Jarvis.  He still feels occasional giving way in the right leg.  On evaluation in September 2024, Dr. Jarvis felt the knee was stable.  He sent him to physical therapy at that time.  His back has not been evaluated.    He had previous right knee arthroscopy 2021    Other PMH:   Past Medical History:   Diagnosis Date     Acute medial meniscus tear of right knee      Arthritis      Chronic infection  (?)      Diabetes mellitus, type 2 (H) 07/25/2023      Essential tremor     hands     Gout      History of hernia repair      HTN      Idiopathic chronic gout without tophus, unspecified site 12/05/2018     Parathyroid adenoma 2007    parathyroidectomy 04/2007     Paresthesia of right leg-- he has numbness/tingling in both legs      Partial arterial retinal occlusion     right eye     Sleep apnea     no CPAP      Current Outpatient Medications   Medication Sig Dispense Refill     acetaminophen (TYLENOL) 325 MG tablet Take 3 tablets (975 mg) by mouth every 8 hours 100 tablet 0     allopurinol (ZYLOPRIM) 100 MG tablet TAKE 1 TABLET BY MOUTH TWICE DAILY FOR  GOUT 180 tablet 3     ascorbic acid (VITAMIN C) 1000 MG TABS Take 1,000 mg by mouth 2 times daily.       carvedilol (COREG) 25 MG tablet TAKE 1 TABLET BY MOUTH TWICE DAILY WITH MEALS FOR BLOOD PRESSURE 180 tablet 3     chlorthalidone (HYGROTON) 25 MG tablet Take 1 tablet (25 mg) by mouth every morning For blood pressure. 90 tablet 3     diclofenac (VOLTAREN) 75 MG EC tablet Take 1 tablet (75 mg) by mouth 2 times daily. 180 tablet 0     losartan (COZAAR) 100 MG tablet Take 1 tablet by mouth once daily for blood pressure 90 tablet 3     metFORMIN (GLUCOPHAGE) 500 MG tablet TAKE 1 TABLET BY MOUTH TWICE DAILY WITH MEALS FOR DIABETES 180 tablet 3     MULTIPLE VITAMIN PO Take 1 tablet by mouth daily Stopping 8/4/23 before surgery           Surgical:  has a past surgical history that includes laminect/discectomy, cervical (01/01/1999); APPENDECTOMY; hernia repair; Arthroscopy knee with medial meniscectomy (Right, 06/30/2021); Parathyroidectomy (2007); Abdomen surgery; colonoscopy; and Arthroplasty knee (Right, 8/11/2023).    Family Hx:  family history includes Alzheimer Disease in his paternal grandmother; Hypertension in his maternal uncle; Prostate Cancer in his father.    Social Hx:  reports that he has never smoked. He has never been exposed to tobacco smoke. He has never used smokeless tobacco. He reports that he does  not currently use alcohol. He reports that he does not use drugs.    REVIEW OF SYSTEMS:    CONSTITUTIONAL:  NEGATIVE for fever, chills, change in weight  INTEGUMENTARY/SKIN:  NEGATIVE for worrisome rashes, moles or lesions  EYES:  NEGATIVE for vision changes or irritation  ENT/MOUTH:  NEGATIVE for ear, mouth and throat problems  RESP:  NEGATIVE for significant cough or SOB  BREAST:  NEGATIVE for masses, tenderness or discharge  CV:  NEGATIVE for chest pain, palpitations or peripheral edema  GI:  NEGATIVE for nausea, abdominal pain, heartburn, or change in bowel habits  :  Negative   MUSCULOSKELETAL:  See HPI above  NEURO:  NEGATIVE for weakness, dizziness or paresthesias  ENDOCRINE:  NEGATIVE for temperature intolerance, skin/hair changes  HEME/ALLERGY/IMMUNE:  NEGATIVE for bleeding problems  PSYCHIATRIC:  NEGATIVE for changes in mood or affect    PHYSICAL EXAM:  /80 (BP Location: Left arm, Patient Position: Sitting, Cuff Size: Adult Regular)   Pulse 77   Ht 1.829 m (6')   Wt 108.4 kg (239 lb)   SpO2 97%   BMI 32.41 kg/m     GENERAL APPEARANCE: healthy, alert, and no distress   SKIN: no suspicious lesions or rashes  NEURO: Normal strength and tone, mentation intact, and speech normal  VASCULAR:  good pulses, and cappillary refill   LYMPH: no lymphadenopathy   PSYCH:  mentation appears normal and affect normal/bright  RESP: no increased work of breathing     KNEE EXAM:  left knee:   Gait: uses a cane  Alignment: mild varus  Patellofemoral joint: mild crepitations in the patellofemoral joint.  Effusion: mild  ROM: 0-110  Tender: medial joint line  Masses: none  Ligaments:  Lachman's Stable, Anterior and posterior drawer stable, stable to varus and valgus stress.  no pain with Varus/Valgus stress testing.      Right knee  Facet Tenderness: NO  Good range of motion   Good collateral stability      EXAM: XR KNEE LEFT 3 VIEWS  LOCATION: [facility]  DATE: 7/9/2025   Degenerative change  at the medial compartment with near bone-on-bone contact. Slight genu varus. Mild degenerative change patellofemoral compartment. Tiny effusion. Chondrocalcinosis in the lateral compartment noted.  No posterior osteophytes of note.          Impression:   Encounter Diagnosis   Name Primary?     Primary osteoarthritis of left knee Yes   Mainly medial.  Mild-moderate patellofemoral joint involvement.     Plan:  since he is not fully happy with the right total knee, I couldn't guarantee him that he wouldn't have the same feeling about a total knee arthroplasty on the left side.  We could opt to resurface the left patella depending on the appearance of the articular surface, and hope he feels better about his new knee.  We did discuss an  brace, and this was ordered to trial.  I have scheduled him for a left total knee arthroplasty, but if the brace is a revelation for him, he may cancel surgery. Because of the chondrocalcinosis on the lateral side, I wouldn't suggest unicompartmental knee arthroplasty     Total Knee Arthroplasty: We talked about the patient's condition and diagnosis.  Because of severe arthritis, and failure of conservative measures, I have suggested total knee arthroplasty of the left knee(s).         Bill Smith has severe, disabling pain and loss of knee function to the extent which interferes with ability to carry out age appropriate activities of daily living.  Patient does demonstrate a function-limiting pain at short distances for at least 12 months duration.     Patient has failed/exhausted appropriate course of provider-directed non-surgical management for at least 3 months. (See above)  The patient's smoking (nicotine) status:   Examples of nicotine products include cigarettes, non-combusted cigarettes, cigars, smokeless tobacco (e.g., dip, snuff, snus, chewing tobacco), hookah tobacco, e-cigarettes, and vape pens.  Patient is a never-smoker  Patient has advanced radiographic  imaging which correlates with the individual's reported symptoms and physical exam findings. This includes serial x-rays which shows evidence of advanced degenerative changes including complete medial joint space narrowing and osteophyte formation.  Kellgren-Adelso grade 4 changes are noted    Patient's medical chart was reviewed and they do not have any specific contraindications to total knee arthroplasty that would have a unacceptable risk of compromising the outcome.     He does have some risks to take into consideration, including diabetes.    Bill Smith  is therefore a appropriate candidate for right total knee arthroplasty. Medical clearance will need to be obtained.     Risks, benefits and alternatives to total knee arthroplasty discussed in detail including but not limited to bleeding which may necessitate transfusion, infection which may require secondary surgery, nerve damage which may result in temporary or permanent sensory and/or motor deficit,   vascular injury, DVT, PE, continued pain, (both perioperative and persistent post-recovery pain), numbness around the wound, instability, and potential anesthetic and perioperative medical complications, and the possibility of needing additional proceduresinstability, perceived limb length inequality, intraoperative and postoperative fracture, and possible need for revision were discussed. Possibility of post surgical pain and stiffness possibly requiring manipulation under anesthesia discussed. Possible postoperative pain with kneeling discussed. The overall average satisfaction rate of approximately 80-90% after total knee arthoplasty was discussed.  We also discussed the arduous nature of the early rehabilitation.  Following total knee arthroplasty.  We specifically discussed the first 6 weeks during which time patients often have significant swelling, stiffness and soreness.  Patient verbalized understanding and elects to proceed with total knee  arthroplasty in light of potential benefits.     In addition I explained that total knee arthroplasty is considered an outpatient procedure.  An overnight stay is allowed but the patient must be able to go home by the day after surgery.   It was explained to the patient that friends and family should be recruited to help with activities of daily living at home and be available to drive the patient to physical therapy appointments.  Home health care physical therapy has not been universally available.      Special considerations evaluate patella. We probably will re-surface    DVT/VTE risk is low, ASA 325mg twice daily will be used for anticoagulation postoperative x 1 month.       MONICA Riley MD  Dept. Orthopedic Surgery  [facility]     Again, thank you for allowing me to participate in the care of your patient.        Sincerely,        Jens Riley MD    Electronically signed

## 2025-08-06 ENCOUNTER — ANESTHESIA EVENT (OUTPATIENT)
Dept: SURGERY | Facility: CLINIC | Age: 75
End: 2025-08-06
Payer: MEDICARE

## 2025-08-07 ENCOUNTER — HOSPITAL ENCOUNTER (OUTPATIENT)
Facility: CLINIC | Age: 75
Discharge: HOME | End: 2025-08-07
Attending: STUDENT IN AN ORGANIZED HEALTH CARE EDUCATION/TRAINING PROGRAM | Admitting: STUDENT IN AN ORGANIZED HEALTH CARE EDUCATION/TRAINING PROGRAM
Payer: MEDICARE

## 2025-08-07 ENCOUNTER — ANESTHESIA (OUTPATIENT)
Dept: SURGERY | Facility: CLINIC | Age: 75
End: 2025-08-07
Payer: MEDICARE

## 2025-08-07 VITALS
BODY MASS INDEX: 32.41 KG/M2 | RESPIRATION RATE: 18 BRPM | HEART RATE: 76 BPM | TEMPERATURE: 98.8 F | WEIGHT: 239 LBS | OXYGEN SATURATION: 96 % | SYSTOLIC BLOOD PRESSURE: 135 MMHG | DIASTOLIC BLOOD PRESSURE: 90 MMHG

## 2025-08-07 LAB — GLUCOSE BLDC GLUCOMTR-MCNC: 152 MG/DL (ref 70–99)

## 2025-08-07 PROCEDURE — 370N000004 HC ANESTHESIA CATARACT PACKAGE: Performed by: STUDENT IN AN ORGANIZED HEALTH CARE EDUCATION/TRAINING PROGRAM

## 2025-08-07 PROCEDURE — 250N000011 HC RX IP 250 OP 636

## 2025-08-07 PROCEDURE — 250N000009 HC RX 250: Performed by: STUDENT IN AN ORGANIZED HEALTH CARE EDUCATION/TRAINING PROGRAM

## 2025-08-07 PROCEDURE — V2632 POST CHMBR INTRAOCULAR LENS: HCPCS | Performed by: STUDENT IN AN ORGANIZED HEALTH CARE EDUCATION/TRAINING PROGRAM

## 2025-08-07 PROCEDURE — 250N000011 HC RX IP 250 OP 636: Performed by: STUDENT IN AN ORGANIZED HEALTH CARE EDUCATION/TRAINING PROGRAM

## 2025-08-07 PROCEDURE — 82962 GLUCOSE BLOOD TEST: CPT

## 2025-08-07 PROCEDURE — 360N000007 HC CATARACT SURGICAL PACKAGE: Performed by: STUDENT IN AN ORGANIZED HEALTH CARE EDUCATION/TRAINING PROGRAM

## 2025-08-07 PROCEDURE — 761N000008 HC RECOVERY CATRACT PACKAGE: Performed by: STUDENT IN AN ORGANIZED HEALTH CARE EDUCATION/TRAINING PROGRAM

## 2025-08-07 DEVICE — EYE IMP IOL AMO PCL TECNIS ZCB00 15.5: Type: IMPLANTABLE DEVICE | Site: EYE | Status: FUNCTIONAL

## 2025-08-07 RX ORDER — TROPICAMIDE 10 MG/ML
1 SOLUTION/ DROPS OPHTHALMIC
Status: COMPLETED | OUTPATIENT
Start: 2025-08-07 | End: 2025-08-07

## 2025-08-07 RX ORDER — POVIDONE-IODINE 5 %
1 SOLUTION, NON-ORAL OPHTHALMIC (EYE) ONCE
Status: DISCONTINUED | OUTPATIENT
Start: 2025-08-07 | End: 2025-08-07 | Stop reason: HOSPADM

## 2025-08-07 RX ORDER — PROPARACAINE HYDROCHLORIDE 5 MG/ML
1 SOLUTION/ DROPS OPHTHALMIC ONCE
Status: COMPLETED | OUTPATIENT
Start: 2025-08-07 | End: 2025-08-07

## 2025-08-07 RX ORDER — DICLOFENAC SODIUM 1 MG/ML
1 SOLUTION/ DROPS OPHTHALMIC
Status: COMPLETED | OUTPATIENT
Start: 2025-08-07 | End: 2025-08-07

## 2025-08-07 RX ORDER — PHENYLEPHRINE HYDROCHLORIDE 25 MG/ML
1 SOLUTION/ DROPS OPHTHALMIC
Status: COMPLETED | OUTPATIENT
Start: 2025-08-07 | End: 2025-08-07

## 2025-08-07 RX ORDER — MOXIFLOXACIN 5 MG/ML
1 SOLUTION/ DROPS OPHTHALMIC
Status: COMPLETED | OUTPATIENT
Start: 2025-08-07 | End: 2025-08-07

## 2025-08-07 RX ADMIN — PHENYLEPHRINE HYDROCHLORIDE 1 DROP: 25 SOLUTION/ DROPS OPHTHALMIC at 11:14

## 2025-08-07 RX ADMIN — DICLOFENAC SODIUM 1 DROP: 1 SOLUTION OPHTHALMIC at 11:07

## 2025-08-07 RX ADMIN — TROPICAMIDE 1 DROP: 10 SOLUTION/ DROPS OPHTHALMIC at 11:14

## 2025-08-07 RX ADMIN — PROPARACAINE HYDROCHLORIDE 1 DROP: 5 SOLUTION/ DROPS OPHTHALMIC at 11:06

## 2025-08-07 RX ADMIN — TROPICAMIDE 1 DROP: 10 SOLUTION/ DROPS OPHTHALMIC at 11:18

## 2025-08-07 RX ADMIN — DICLOFENAC SODIUM 1 DROP: 1 SOLUTION OPHTHALMIC at 11:14

## 2025-08-07 RX ADMIN — TROPICAMIDE 1 DROP: 10 SOLUTION/ DROPS OPHTHALMIC at 11:07

## 2025-08-07 RX ADMIN — DICLOFENAC SODIUM 1 DROP: 1 SOLUTION OPHTHALMIC at 11:19

## 2025-08-07 RX ADMIN — PROPARACAINE HYDROCHLORIDE 1 DROP: 5 SOLUTION/ DROPS OPHTHALMIC at 11:18

## 2025-08-07 RX ADMIN — MOXIFLOXACIN HYDROCHLORIDE 1 DROP: 5 SOLUTION/ DROPS OPHTHALMIC at 11:14

## 2025-08-07 RX ADMIN — PHENYLEPHRINE HYDROCHLORIDE 1 DROP: 25 SOLUTION/ DROPS OPHTHALMIC at 11:07

## 2025-08-07 RX ADMIN — MOXIFLOXACIN HYDROCHLORIDE 1 DROP: 5 SOLUTION/ DROPS OPHTHALMIC at 11:07

## 2025-08-07 RX ADMIN — PHENYLEPHRINE HYDROCHLORIDE 1 DROP: 25 SOLUTION/ DROPS OPHTHALMIC at 11:19

## 2025-08-07 RX ADMIN — MIDAZOLAM 1 MG: 1 INJECTION INTRAMUSCULAR; INTRAVENOUS at 11:53

## 2025-08-07 RX ADMIN — MOXIFLOXACIN HYDROCHLORIDE 1 DROP: 5 SOLUTION/ DROPS OPHTHALMIC at 11:18

## 2025-08-07 ASSESSMENT — ACTIVITIES OF DAILY LIVING (ADL)
ADLS_ACUITY_SCORE: 56
ADLS_ACUITY_SCORE: 56

## (undated) DEVICE — TUBING ARTHROSCOPY PUMP ARTHREX AR-6410

## (undated) DEVICE — GLOVE PROTEXIS W/NEU-THERA 8.5  2D73TE85

## (undated) DEVICE — BLADE SURGEON STRL #22 371222

## (undated) DEVICE — CAST PADDING 6" STERILE 9046S

## (undated) DEVICE — SOL NACL 0.9% IRRIG 3000ML BAG 07972-08

## (undated) DEVICE — BNDG ELASTIC 6"X5YDS UNSTERILE 6611-60

## (undated) DEVICE — DRAPE SHEET REV FOLD 3/4 9349

## (undated) DEVICE — BUR ARTHREX COOLCUT DISSECTOR 4.0MMX13CM AR-8400DS

## (undated) DEVICE — SUTURE VICRYL+ 2-0 27IN CT-1 UND VCP259H

## (undated) DEVICE — Device

## (undated) DEVICE — SUCTION MANIFOLD NEPTUNE 2 SYS 4 PORT 0702-020-000

## (undated) DEVICE — SUTURE VICRYL+ 0 36IN CT-1 UND VCP946H

## (undated) DEVICE — SOL NACL 0.9% IRRIG 1000ML BOTTLE 2F7124

## (undated) DEVICE — CUSTOM PACK TOTAL KNEE SOP5BTKHEC

## (undated) DEVICE — GLOVE PROTEXIS W/NEU-THERA 7.5  2D73TE75

## (undated) DEVICE — PACK ARTHROSCOPY KNEE SOP15AKFSM

## (undated) DEVICE — DRSG STERI STRIP 1/2X4" R1547

## (undated) DEVICE — CUSTOM PACK TOTAL KNEE ACCESSORY SOP5BTAHEA

## (undated) DEVICE — GLOVE PROTEXIS POWDER FREE 8.5 ORTHOPEDIC 2D73ET85

## (undated) DEVICE — TAPE ADH POROUS 3IN CURITY STD 7046C

## (undated) DEVICE — SOL WATER IRRIG 1000ML BOTTLE 2F7114

## (undated) DEVICE — PLATE GROUNDING ADULT W/CORD 9165L

## (undated) DEVICE — PACK MINOR SINGLE BASIN SSK3001

## (undated) DEVICE — DRSG ABDOMINAL 07 1/2X8" 7197D

## (undated) DEVICE — DECANTER VIAL 2006S

## (undated) DEVICE — GOWN XLG DISP 9545

## (undated) DEVICE — SOL NACL 0.9% INJ 1000ML BAG 2B1324X

## (undated) DEVICE — GLOVE BIOGEL PI SZ 7.5 40875

## (undated) DEVICE — DRSG AQUACEL AG 3.5X9.75" HYDROFIBER 412011

## (undated) DEVICE — PREP CHLORAPREP 26ML TINTED ORANGE  260815

## (undated) DEVICE — MANIFOLD NEPTUNE 4 PORT 700-20

## (undated) DEVICE — BLADE SAW SAGITTAL STRK 18X90X1.27MM HD SYS 6 6118-127-090

## (undated) DEVICE — SOL NACL 0.9% IRRIG 1000ML BOTTLE 07138-09

## (undated) DEVICE — SOL WATER IRRIG 1000ML BOTTLE 07139-09

## (undated) DEVICE — BONE CEMENT MIXEVAC HI VAC W/CARTRIDGE 0306-563-000

## (undated) DEVICE — GLOVE BIOGEL PI INDICATOR 8.0 LF 41680

## (undated) DEVICE — GLOVE PROTEXIS W/NEU-THERA 8.0  2D73TE80

## (undated) DEVICE — HOLDER LIMB VELCRO OR 0814-1533

## (undated) DEVICE — A3 SUPPLIES- SEE NURSING INFO PAGE

## (undated) DEVICE — GLOVE BIOGEL INDICATOR 7.5 LF 41675

## (undated) DEVICE — SU MONOCRYL 3-0 PS-2 18" UND MCP497G

## (undated) DEVICE — NEEDLE SPINAL DISP 18X3 QUINCKE BD 5174

## (undated) DEVICE — GLOVE PROTEGRITY 7.5 LATEX

## (undated) RX ORDER — EPHEDRINE SULFATE 50 MG/ML
INJECTION, SOLUTION INTRAMUSCULAR; INTRAVENOUS; SUBCUTANEOUS
Status: DISPENSED
Start: 2023-08-11

## (undated) RX ORDER — LIDOCAINE HYDROCHLORIDE 10 MG/ML
INJECTION, SOLUTION EPIDURAL; INFILTRATION; INTRACAUDAL; PERINEURAL
Status: DISPENSED
Start: 2023-08-11

## (undated) RX ORDER — FENTANYL CITRATE-0.9 % NACL/PF 10 MCG/ML
PLASTIC BAG, INJECTION (ML) INTRAVENOUS
Status: DISPENSED
Start: 2023-08-11

## (undated) RX ORDER — CELECOXIB 200 MG/1
CAPSULE ORAL
Status: DISPENSED
Start: 2021-06-30

## (undated) RX ORDER — PROPOFOL 10 MG/ML
INJECTION, EMULSION INTRAVENOUS
Status: DISPENSED
Start: 2023-08-11

## (undated) RX ORDER — OXYCODONE HYDROCHLORIDE 5 MG/1
TABLET ORAL
Status: DISPENSED
Start: 2021-06-30

## (undated) RX ORDER — EPHEDRINE SULFATE 50 MG/ML
INJECTION, SOLUTION INTRAMUSCULAR; INTRAVENOUS; SUBCUTANEOUS
Status: DISPENSED
Start: 2021-06-30

## (undated) RX ORDER — ONDANSETRON 2 MG/ML
INJECTION INTRAMUSCULAR; INTRAVENOUS
Status: DISPENSED
Start: 2023-08-11

## (undated) RX ORDER — ACETAMINOPHEN 325 MG/1
TABLET ORAL
Status: DISPENSED
Start: 2021-06-30

## (undated) RX ORDER — FENTANYL CITRATE 50 UG/ML
INJECTION, SOLUTION INTRAMUSCULAR; INTRAVENOUS
Status: DISPENSED
Start: 2021-06-30

## (undated) RX ORDER — DEXAMETHASONE SODIUM PHOSPHATE 4 MG/ML
INJECTION, SOLUTION INTRA-ARTICULAR; INTRALESIONAL; INTRAMUSCULAR; INTRAVENOUS; SOFT TISSUE
Status: DISPENSED
Start: 2023-08-11

## (undated) RX ORDER — ONDANSETRON 2 MG/ML
INJECTION INTRAMUSCULAR; INTRAVENOUS
Status: DISPENSED
Start: 2021-06-30